# Patient Record
Sex: MALE | Race: BLACK OR AFRICAN AMERICAN | Employment: FULL TIME | URBAN - METROPOLITAN AREA
[De-identification: names, ages, dates, MRNs, and addresses within clinical notes are randomized per-mention and may not be internally consistent; named-entity substitution may affect disease eponyms.]

---

## 2017-07-13 ENCOUNTER — OP HISTORICAL/CONVERTED ENCOUNTER (OUTPATIENT)
Dept: OTHER | Age: 57
End: 2017-07-13

## 2017-08-04 ENCOUNTER — ED HISTORICAL/CONVERTED ENCOUNTER (OUTPATIENT)
Dept: OTHER | Age: 57
End: 2017-08-04

## 2017-08-22 ENCOUNTER — ED HISTORICAL/CONVERTED ENCOUNTER (OUTPATIENT)
Dept: OTHER | Age: 57
End: 2017-08-22

## 2019-05-11 ENCOUNTER — ED HISTORICAL/CONVERTED ENCOUNTER (OUTPATIENT)
Dept: OTHER | Age: 59
End: 2019-05-11

## 2019-06-09 ENCOUNTER — ED HISTORICAL/CONVERTED ENCOUNTER (OUTPATIENT)
Dept: OTHER | Age: 59
End: 2019-06-09

## 2019-07-03 ENCOUNTER — OP HISTORICAL/CONVERTED ENCOUNTER (OUTPATIENT)
Dept: OTHER | Age: 59
End: 2019-07-03

## 2020-03-16 ENCOUNTER — OP HISTORICAL/CONVERTED ENCOUNTER (OUTPATIENT)
Dept: OTHER | Age: 60
End: 2020-03-16

## 2020-03-16 ENCOUNTER — ED HISTORICAL/CONVERTED ENCOUNTER (OUTPATIENT)
Dept: OTHER | Age: 60
End: 2020-03-16

## 2020-04-23 ENCOUNTER — ED HISTORICAL/CONVERTED ENCOUNTER (OUTPATIENT)
Dept: OTHER | Age: 60
End: 2020-04-23

## 2020-06-25 ENCOUNTER — IP HISTORICAL/CONVERTED ENCOUNTER (OUTPATIENT)
Dept: OTHER | Age: 60
End: 2020-06-25

## 2020-08-11 ENCOUNTER — ED HISTORICAL/CONVERTED ENCOUNTER (OUTPATIENT)
Dept: OTHER | Age: 60
End: 2020-08-11

## 2020-08-13 ENCOUNTER — ED HISTORICAL/CONVERTED ENCOUNTER (OUTPATIENT)
Dept: OTHER | Age: 60
End: 2020-08-13

## 2020-09-09 ENCOUNTER — ED HISTORICAL/CONVERTED ENCOUNTER (OUTPATIENT)
Dept: OTHER | Age: 60
End: 2020-09-09

## 2020-11-08 ENCOUNTER — APPOINTMENT (OUTPATIENT)
Dept: GENERAL RADIOLOGY | Age: 60
End: 2020-11-08
Attending: PHYSICIAN ASSISTANT
Payer: COMMERCIAL

## 2020-11-08 ENCOUNTER — HOSPITAL ENCOUNTER (EMERGENCY)
Age: 60
Discharge: HOME | End: 2020-11-08
Payer: COMMERCIAL

## 2020-11-08 VITALS
SYSTOLIC BLOOD PRESSURE: 140 MMHG | DIASTOLIC BLOOD PRESSURE: 84 MMHG | HEART RATE: 100 BPM | BODY MASS INDEX: 33.62 KG/M2 | WEIGHT: 262 LBS | TEMPERATURE: 98.4 F | OXYGEN SATURATION: 98 % | RESPIRATION RATE: 18 BRPM | HEIGHT: 74 IN

## 2020-11-08 DIAGNOSIS — E13.69 OTHER SPECIFIED DIABETES MELLITUS WITH OTHER SPECIFIED COMPLICATION, UNSPECIFIED WHETHER LONG TERM INSULIN USE (HCC): ICD-10-CM

## 2020-11-08 DIAGNOSIS — T14.8XXA TRAUMATIC HEMATOMA: ICD-10-CM

## 2020-11-08 DIAGNOSIS — S97.112A CRUSHING INJURY OF LEFT GREAT TOE, INITIAL ENCOUNTER: Primary | ICD-10-CM

## 2020-11-08 PROCEDURE — 99283 EMERGENCY DEPT VISIT LOW MDM: CPT

## 2020-11-08 PROCEDURE — 75810000463 HC INC/DRN HEMATOMA/SEROMA LVL 2 5052

## 2020-11-08 PROCEDURE — 73660 X-RAY EXAM OF TOE(S): CPT

## 2020-11-08 NOTE — ED TRIAGE NOTES
Reports that a big trashcan/ dumpster hit his L great toe on Friday. States he went home and iced it but the pain has gotten worse and his toe is black and blue c swelling.  Also c/o cramps to L lower leg

## 2020-11-08 NOTE — ED PROVIDER NOTES
EMERGENCY DEPARTMENT HISTORY AND PHYSICAL EXAM      Date: 11/8/2020  Patient Name: Joe Yang    History of Presenting Illness     Chief Complaint   Patient presents with    Toe Pain     L great    Leg Pain     L - cramping       History Provided By: Patient    HPI: Joe Yang, 61 y.o. male with a past medical history significant diabetes and hypertension presents to the ED with cc of left great toe pain onset yesterday after crush injury. Patient reports while he was at work, wearing soft toed shoes, a dumpster rolled over his toe. He has a fungal infection of the toenail previously and he has a history of peripheral neuropathy. His pain is minimal however he was concerned about darkening discoloration to the nail bed. He did not take any treatments prior to arrival.  He denies any other injury during the incident. He denies any other symptoms or complaints at this time. Sugars have been running around 150. He takes gabapentin for his neuropathy which he is not tolerating well. There are no other complaints, changes, or physical findings at this time. PCP: UNKNOWN    No current facility-administered medications on file prior to encounter. Current Outpatient Medications on File Prior to Encounter   Medication Sig Dispense Refill    gabapentin (NEURONTIN) 100 mg capsule TAKE 1 CAPSULE BY MOUTH 2 TIMES A DAY 60 Cap 3       Past History     Past Medical History:  No past medical history on file. Past Surgical History:  No past surgical history on file. Family History:  No family history on file. Social History:  Social History     Tobacco Use    Smoking status: Not on file   Substance Use Topics    Alcohol use: Not on file    Drug use: Not on file       Allergies:  No Known Allergies      Review of Systems   Review of Systems   Constitutional: Negative for activity change, chills and fever. HENT: Negative for congestion, ear pain, rhinorrhea and trouble swallowing. Eyes: Negative for pain and visual disturbance. Respiratory: Negative for cough and shortness of breath. Cardiovascular: Negative for chest pain. Gastrointestinal: Negative for abdominal pain, diarrhea, nausea and vomiting. Genitourinary: Negative for decreased urine volume, difficulty urinating, dysuria and hematuria. Musculoskeletal: Positive for arthralgias and myalgias. Negative for gait problem. Skin: Positive for wound. Negative for rash. Neurological: Negative for weakness and headaches. Hematological: Negative for adenopathy. Psychiatric/Behavioral: The patient is not nervous/anxious. All other systems reviewed and are negative. Physical Exam   Physical Exam  Vitals signs and nursing note reviewed. Constitutional:       General: He is not in acute distress. Appearance: He is well-developed. HENT:      Head: Normocephalic and atraumatic. Mouth/Throat:      Mouth: Mucous membranes are moist.   Eyes:      Extraocular Movements: Extraocular movements intact. Pupils: Pupils are equal, round, and reactive to light. Cardiovascular:      Rate and Rhythm: Normal rate and regular rhythm. Pulses: Normal pulses. Heart sounds: Normal heart sounds. Pulmonary:      Effort: Pulmonary effort is normal. No respiratory distress. Breath sounds: Normal breath sounds. Abdominal:      General: Abdomen is flat. Bowel sounds are normal.      Palpations: Abdomen is soft. Tenderness: There is no abdominal tenderness. Musculoskeletal:      Right lower leg: No edema. Left lower leg: No edema. Left foot: Normal range of motion. No deformity. Feet:    Feet:      Left foot:      Toenail Condition: Left toenails are abnormally thick. Fungal disease present.      Comments: Left great toe: Minimal tenderness to palpation of the joint, minimal erythema, no streaking redness, no drainage, small superficial hematoma to the nailbed, fluctuant, capillary refill less than 3 seconds  Skin:     General: Skin is warm and dry. Capillary Refill: Capillary refill takes less than 2 seconds. Findings: No rash. Neurological:      General: No focal deficit present. Mental Status: He is alert. Cranial Nerves: No cranial nerve deficit. Psychiatric:         Mood and Affect: Mood normal.         Behavior: Behavior normal.         Diagnostic Study Results     Labs -   No results found for this or any previous visit (from the past 48 hour(s)). Radiologic Studies -   Results from Hospital Encounter encounter on 11/08/20   XR GREAT TOE LT MIN 2 V    Narrative Left great toe, 3 views      Impression IMPRESSION: No evidence of acute fracture, dislocation, or radiodense foreign  body. Bipartite lateral plantar sesamoid adjacent to left first metatarsal head. CT Results  (Last 48 hours)    None          Medical Decision Making   I am the first provider for this patient. I reviewed the vital signs, available nursing notes, past medical history, past surgical history, family history and social history. Vital Signs-Reviewed the patient's vital signs. Patient Vitals for the past 12 hrs:   Temp Pulse Resp BP SpO2   11/08/20 1110 98 °F (36.7 °C) (!) 108 18 (!) 165/90 97 %       Records Reviewed: Nursing Notes    Provider Notes (Medical Decision Making):     MDM  Number of Diagnoses or Management Options  Crushing injury of left great toe, initial encounter:   Other specified diabetes mellitus with other specified complication, unspecified whether long term insulin use [facility]):   Traumatic hematoma:   Diagnosis management comments: DDX: Crush injury, fracture, dislocation, traumatic hematoma    I have provided follow-up for the patient with podiatry and vascular surgery to discuss this peripheral neuropathy and have his diabetic foot exam.  I have also advised that he wear steel toed boots at work which she states he owns in order to protect his toes. Amount and/or Complexity of Data Reviewed  Tests in the radiology section of CPT®: ordered and reviewed        ED Course:   Initial assessment performed. The patients presenting problems have been discussed, and they are in agreement with the care plan formulated and outlined with them. I have encouraged them to ask questions as they arise throughout their visit. PROCEDURES    Hematoma Evacuation    Date/Time: 11/8/2020 12:27 PM  Performed by: Ab Kelley PA-C  Authorized by: Ab Kelley PA-C     Consent:     Consent obtained:  Verbal    Consent given by:  Patient    Risks discussed:  Bleeding, pain and incomplete drainage    Alternatives discussed:  No treatment and alternative treatment  Indications:     Indications:  Hematoma  Pre-procedure details:     Procedure prep: alcohol swab. Post-procedure details:     Patient tolerance of procedure: Tolerated well, no immediate complications  Comments:      Hematoma at nailbed of left great toe was incised with 11 blade scalpel, hematoma was fully evacuated         DISPOSITION    Discharged    PLAN:  1. Current Discharge Medication List         2. Follow-up Information     Follow up With Specialties Details Why Contact Info    Mana Vicente DPM Podiatry Schedule an appointment as soon as possible for a visit Podiatrist to follow up with [address] [place] [id number]  [phone]      Kayden Guzman MD General and Vascular Surgery Schedule an appointment as soon as possible for a visit Vascular specialist [address] [facility] [address] [phone]      [facility] EMERGENCY DEPT Emergency Medicine  As needed, If symptoms worsen [address] [facility] [address] [phone]        Return to ED if worse     Diagnosis     Clinical Impression:   1. Crushing injury of left great toe, initial encounter    2. Traumatic hematoma    3.  Other specified diabetes mellitus with other specified complication, unspecified whether long term insulin use ([facility]

## 2020-11-08 NOTE — DISCHARGE INSTRUCTIONS
Patient Education        Hematoma: Care Instructions  Your Care Instructions     A hematoma is a bad bruise. It happens when an injury causes blood to collect and pool under the skin. The pooling blood gives the skin a spongy, rubbery, lumpy feel. A hematoma usually is not a cause for concern. It is not the same thing as a blood clot in a vein, and it does not cause blood clots. Follow-up care is a key part of your treatment and safety. Be sure to make and go to all appointments, and call your doctor if you are having problems. It's also a good idea to know your test results and keep a list of the medicines you take. How can you care for yourself at home? · Rest and protect the bruised area. · Put ice or a cold pack on the area for 10 to 20 minutes at a time. · Prop up the bruised area on a pillow when you ice it or anytime you sit or lie down during the next 3 days. Try to keep it above the level of your heart. This will help reduce swelling. · Wrapping the bruised area with an elastic bandage such as an Ace wrap will help decrease swelling. Don't wrap it too tightly, as this can cause more swelling below the affected area. · Be safe with medicines. Read and follow all instructions on the label. ? If the doctor gave you a prescription medicine for pain, take it as prescribed. ? If you are not taking a prescription pain medicine, ask your doctor if you can take an over-the-counter medicine. · Do not take two or more pain medicines at the same time unless the doctor told you to. Many pain medicines have acetaminophen, which is Tylenol. Too much acetaminophen (Tylenol) can be harmful. When should you call for help? Call your doctor now or seek immediate medical care if:    · You have signs of skin infection, such as:  ? Increased pain, swelling, warmth, or redness. ? Red streaks leading from the area. ? Pus draining from the area. ? A fever.    Watch closely for changes in your health, and be sure to contact your doctor if:    · The bruise lasts longer than 4 weeks.     · The bruise gets bigger or becomes more painful.     · You do not get better as expected. Where can you learn more? Go to http://www.gray.com/  Enter P911 in the search box to learn more about \"Hematoma: Care Instructions. \"  Current as of: June 26, 2019               Content Version: 12.6  © 1800-7537 [employer]. Care instructions adapted under license by [employer] (which disclaims liability or warranty for this information). If you have questions about a medical condition or this instruction, always ask your healthcare professional. [place] [address] any warranty or liability for your use of this information. Patient Education        Learning About Returning to Activity After Injury  What's important to know about injury recovery? Recovery from an injury takes time. Healing can take longer than you want it to. You may be tempted to return to your normal activities before you have fully healed. But stressing an injury makes it take even longer to heal. And you could make your injury worse than it's ever been. An injury heals fastest when you give it the rest and special care it needs. Your doctor can help you know when you're ready to ease back into normal activity. How can you help an injury heal?  You can speed up healing by avoiding movements that make it worse. It's also important to follow your doctor's instructions. · Ask your doctor if you can take an over-the-counter pain medicine, such as acetaminophen (Tylenol), ibuprofen (Advil, Motrin), or naproxen (Aleve). Be safe with medicines. Read and follow all instructions on the label. · Put ice or a cold pack on the area for 10 to 20 minutes at a time to ease pain. Put a thin cloth between the ice and your skin.   · If your doctor gave you a splint, a pair of crutches, or a sling, use it exactly as directed. Physical or occupational therapy can help you learn how to move in new ways and to recover from an injury. If you are given exercises to do, ease into them. Start each exercise slowly. Ease off an exercise if you start to have pain. When you have a routine of exercises, do them as often and as long as prescribed. While you heal, it also helps to keep the rest of your body moving. A physical therapist can suggest other exercises or ways of doing things to keep up your strength and energy. How do you return to activity? Slowly ease back into normal activity so you don't injure yourself again. A reinjury can be harder to heal than an original injury. If you are getting back into a sport, do it step by step. A  or physical therapist can help you do this safely. Start with short, easy movements or workouts. Then slowly add more over time. · Warm up before and stretch after the activity. · Stop what you're doing if it hurts. · When you're done, use ice to prevent pain and swelling. It may help to make some changes. For example, if a sport caused tendon pain, try another one for a while. If using a tool causes pain, change your  or use the other hand. When should you call for help? Watch closely for changes in your health, and be sure to contact your doctor if:  · Your symptoms are getting worse. · You have new symptoms, such as numbness or weakness. · You do not get better as expected. Follow-up care is a key part of your treatment and safety. Be sure to make and go to all appointments, and call your doctor if you are having problems. It's also a good idea to know your test results and keep a list of the medicines you take. Where can you learn more? Go to http://www.gray.com/  Enter B834 in the search box to learn more about \"Learning About Returning to Activity After Injury. \"  Current as of: March 2, 2020               Content Version: 12.6  © 8401-7557 [employer]. Care instructions adapted under license by [employer] (which disclaims liability or warranty for this information). If you have questions about a medical condition or this instruction, always ask your healthcare professional. [address] any warranty or liability for your use of this information.

## 2020-11-11 ENCOUNTER — OFFICE VISIT (OUTPATIENT)
Dept: PODIATRY | Age: 60
End: 2020-11-11
Payer: MEDICAID

## 2020-11-11 VITALS
HEIGHT: 74 IN | TEMPERATURE: 97.8 F | DIASTOLIC BLOOD PRESSURE: 99 MMHG | WEIGHT: 258.8 LBS | HEART RATE: 92 BPM | SYSTOLIC BLOOD PRESSURE: 164 MMHG | BODY MASS INDEX: 33.21 KG/M2 | OXYGEN SATURATION: 99 %

## 2020-11-11 DIAGNOSIS — N52.1 ERECTILE DYSFUNCTION DUE TO DISEASES CLASSIFIED ELSEWHERE: ICD-10-CM

## 2020-11-11 DIAGNOSIS — L03.032 PARONYCHIA OF GREAT TOE OF LEFT FOOT: Primary | ICD-10-CM

## 2020-11-11 DIAGNOSIS — E11.42 TYPE 2 DIABETES MELLITUS WITH DIABETIC POLYNEUROPATHY, WITHOUT LONG-TERM CURRENT USE OF INSULIN (HCC): ICD-10-CM

## 2020-11-11 DIAGNOSIS — E11.42 DIABETIC POLYNEUROPATHY ASSOCIATED WITH TYPE 2 DIABETES MELLITUS (HCC): ICD-10-CM

## 2020-11-11 PROCEDURE — 99203 OFFICE O/P NEW LOW 30 MIN: CPT | Performed by: PODIATRIST

## 2020-11-11 PROCEDURE — 11730 AVULSION NAIL PLATE SIMPLE 1: CPT | Performed by: PODIATRIST

## 2020-11-11 RX ORDER — PREGABALIN 75 MG/1
75 CAPSULE ORAL 3 TIMES DAILY
Qty: 90 CAP | Refills: 2 | Status: SHIPPED | OUTPATIENT
Start: 2020-11-11 | End: 2020-12-09 | Stop reason: ALTCHOICE

## 2020-11-11 RX ORDER — SILVER SULFADIAZINE 10 G/1000G
CREAM TOPICAL DAILY
Qty: 25 G | Refills: 0 | Status: SHIPPED | OUTPATIENT
Start: 2020-11-11 | End: 2020-12-09 | Stop reason: ALTCHOICE

## 2020-11-16 NOTE — PROGRESS NOTES
[facility]    Subjective:         Patient is a 61 y.o. male who is being seen as a new pt for multiple lower extremity complaints. Patient states he is a diabetic who suffers from burning and tingling in both of his feet. He denies any trauma. He states his diabetes is currently under control but he cannot recall his last hemoglobin A1c. He states his left big toenail has become red and swollen. He denies any systemic signs of infection but states he has noted clear drainage. He states he is having pain rising the level of 5 out of 10 in the digit. He states the pain is localized and nonradiating. He states weightbearing and close toed shoes exacerbate the pain. He denies any other lower extremity complaints    As an aside, he states he suffers from erectile dysfunction and would like a recommendation for a local urologist    Past Medical History:   Diagnosis Date    Diabetes ([facility]     Hypercholesterolemia     Hypertension      History reviewed. No pertinent surgical history. History reviewed. No pertinent family history. Social History     Tobacco Use    Smoking status: Never Smoker    Smokeless tobacco: Never Used   Substance Use Topics    Alcohol use: Yes     No Known Allergies  Prior to Admission medications    Medication Sig Start Date End Date Taking? Authorizing Provider   pregabalin (LYRICA) 75 mg capsule Take 1 Cap by mouth three (3) times daily. Max Daily Amount: 225 mg. 11/11/20  Yes Virgen Nava DPM   silver sulfADIAZINE (SILVADENE) 1 % topical cream Apply  to affected area daily. 11/11/20  Yes Karol Neville DPM       Review of Systems   Constitutional: Negative. HENT: Negative. Eyes: Negative. Respiratory: Negative. Cardiovascular: Negative. Gastrointestinal: Negative. Endocrine: Negative. Genitourinary: Negative. Musculoskeletal: Negative. Allergic/Immunologic: Positive for immunocompromised state.    Neurological: Positive for numbness. Hematological: Negative. Psychiatric/Behavioral: Negative. All other systems reviewed and are negative. Objective:     Visit Vitals  BP (!) 164/99 (BP 1 Location: Right arm, BP Patient Position: Sitting)   Pulse 92   Temp 97.8 °F (36.6 °C) (Temporal)   Ht 6' 2\" (1.88 m)   Wt 258 lb 12.8 oz (117.4 kg)   SpO2 99%   BMI 33.23 kg/m²       Physical Exam  Vitals signs reviewed. Constitutional:       Appearance: He is obese. Cardiovascular:      Pulses:           Dorsalis pedis pulses are 2+ on the right side and 2+ on the left side. Posterior tibial pulses are 2+ on the right side and 2+ on the left side. Pulmonary:      Effort: Pulmonary effort is normal.   Musculoskeletal:      Right lower leg: No edema. Left lower leg: No edema. Right foot: Normal range of motion. No deformity or bunion. Left foot: Normal range of motion. No deformity or bunion. Feet:      Right foot:      Protective Sensation: 10 sites tested. 0 sites sensed. Skin integrity: Skin integrity normal.      Toenail Condition: Right toenails are normal.      Left foot:      Protective Sensation: 10 sites tested. 0 sites sensed. Skin integrity: Erythema and warmth present. Toenail Condition: Left toenails are ingrown. Lymphadenopathy:      Lower Body: No right inguinal adenopathy. No left inguinal adenopathy. Skin:     General: Skin is warm. Capillary Refill: Capillary refill takes 2 to 3 seconds. Neurological:      Mental Status: He is alert and oriented to person, place, and time. Psychiatric:         Mood and Affect: Mood and affect normal.         Behavior: Behavior is cooperative. Data Review: No results found for this or any previous visit (from the past 24 hour(s)). Impression:       ICD-10-CM ICD-9-CM    1. Paronychia of great toe of left foot  L03.032 681.11    2.  Diabetic polyneuropathy associated with type 2 diabetes mellitus (HCC)  E11.42 250.60 pregabalin (LYRICA) 75 mg capsule     357.2    3. Type 2 diabetes mellitus with diabetic polyneuropathy, without long-term current use of insulin (HCC)  E11.42 250.60      357.2    4. Erectile dysfunction due to diseases classified elsewhere  N52.1 607.84 REFERRAL TO UROLOGY         Recommendation:     Patient seen and evaluated in the office  Discussed and educated patient regarding their current medical condition. Instructed patient to monitor their feet daily, be compliant with all medications and wear supportive shoe gear. Patient elected for total nail plate removal of the left hallux. This was performed without incident and no anesthesia was needed. Appropriate dressing was applied.   Patient given home instructions to perform Epsom salt and warm water soaks and to apply antibiotic cream daily  A prescription was given for Silvadene 1% antibiotic cream to be applied as directed  Thoroughly discussed his diabetic peripheral neuropathy, a prescription was given for Lyrica 75 mg to be taken 3 times daily for symptomatic relief  Lastly, a referral was given to a local urologist for his erectile dysfunction        RTC in

## 2020-12-09 ENCOUNTER — OFFICE VISIT (OUTPATIENT)
Dept: UROLOGY | Age: 60
End: 2020-12-09
Payer: MEDICAID

## 2020-12-09 VITALS — HEIGHT: 74 IN | BODY MASS INDEX: 31.83 KG/M2 | TEMPERATURE: 96.8 F | WEIGHT: 248 LBS

## 2020-12-09 DIAGNOSIS — E11.42 TYPE 2 DIABETES MELLITUS WITH DIABETIC POLYNEUROPATHY, WITHOUT LONG-TERM CURRENT USE OF INSULIN (HCC): ICD-10-CM

## 2020-12-09 DIAGNOSIS — N40.0 BENIGN PROSTATIC HYPERPLASIA, UNSPECIFIED WHETHER LOWER URINARY TRACT SYMPTOMS PRESENT: ICD-10-CM

## 2020-12-09 DIAGNOSIS — N52.8 OTHER MALE ERECTILE DYSFUNCTION: Primary | ICD-10-CM

## 2020-12-09 DIAGNOSIS — I10 ESSENTIAL HYPERTENSION: ICD-10-CM

## 2020-12-09 LAB
BILIRUB UR QL STRIP: NEGATIVE
GLUCOSE UR-MCNC: NEGATIVE MG/DL
KETONES P FAST UR STRIP-MCNC: NEGATIVE MG/DL
PH UR STRIP: 5.5 [PH] (ref 4.6–8)
PROT UR QL STRIP: NORMAL
SP GR UR STRIP: 1.02 (ref 1–1.03)
UA UROBILINOGEN AMB POC: NORMAL (ref 0.2–1)
URINALYSIS CLARITY POC: CLEAR
URINALYSIS COLOR POC: YELLOW
URINE BLOOD POC: NEGATIVE
URINE LEUKOCYTES POC: NEGATIVE
URINE NITRITES POC: NEGATIVE

## 2020-12-09 PROCEDURE — 99244 OFF/OP CNSLTJ NEW/EST MOD 40: CPT | Performed by: UROLOGY

## 2020-12-09 PROCEDURE — 81003 URINALYSIS AUTO W/O SCOPE: CPT | Performed by: UROLOGY

## 2020-12-09 RX ORDER — ASPIRIN 81 MG/1
81 TABLET ORAL DAILY
COMMUNITY

## 2020-12-09 RX ORDER — PANTOPRAZOLE SODIUM 40 MG/1
40 TABLET, DELAYED RELEASE ORAL DAILY
COMMUNITY
End: 2021-10-06 | Stop reason: ALTCHOICE

## 2020-12-09 RX ORDER — DULOXETIN HYDROCHLORIDE 60 MG/1
60 CAPSULE, DELAYED RELEASE ORAL DAILY
COMMUNITY
End: 2021-10-06 | Stop reason: ALTCHOICE

## 2020-12-09 RX ORDER — GABAPENTIN 100 MG/1
CAPSULE ORAL 3 TIMES DAILY
COMMUNITY
End: 2021-01-27

## 2020-12-09 RX ORDER — OMEPRAZOLE 40 MG/1
40 CAPSULE, DELAYED RELEASE ORAL DAILY
COMMUNITY

## 2020-12-09 RX ORDER — ATORVASTATIN CALCIUM 20 MG/1
20 TABLET, FILM COATED ORAL DAILY
COMMUNITY

## 2020-12-09 RX ORDER — METOPROLOL TARTRATE 100 MG/1
100 TABLET ORAL 2 TIMES DAILY
COMMUNITY

## 2020-12-09 RX ORDER — PREGABALIN 75 MG/1
CAPSULE ORAL
COMMUNITY
End: 2021-01-28

## 2020-12-09 RX ORDER — TADALAFIL 5 MG/1
5 TABLET ORAL DAILY
Qty: 90 TAB | Refills: 5 | Status: SHIPPED | OUTPATIENT
Start: 2020-12-09 | End: 2022-05-17

## 2020-12-09 NOTE — ASSESSMENT & PLAN NOTE
He has improving sugars. He does not check them daily. Key Antihyperglycemic Medications             empagliflozin (Jardiance) 10 mg tablet (Taking) Take  by mouth daily. Other Key Diabetic Medications             atorvastatin (LIPITOR) 20 mg tablet (Taking) Take  by mouth daily. gabapentin (NEURONTIN) 100 mg capsule (Taking) Take  by mouth three (3) times daily. pregabalin (LYRICA) 75 mg capsule (Taking) Take  by mouth.

## 2020-12-09 NOTE — PROGRESS NOTES
HISTORY OF PRESENT ILLNESS  Enrike Bruner is a 61 y.o. male. Chief Complaint   Patient presents with    New Patient    Erectile Dysfunction     He has a h/o DM, HTN. He is here for ED, referred from Dr Emmie De La Rosa. He has 5-6 months of problems. HUBERT score 8. See the problem list.       He has a weaker stream, intermittency, nocturia x3. IPSS 16/6    Chronic Conditions Addressed Today     1. Type 2 diabetes mellitus with diabetic polyneuropathy, without long-term current use of insulin ([address]     Current Assessment & Plan      He has improving sugars. He does not check them daily. Key Antihyperglycemic Medications             empagliflozin (Jardiance) 10 mg tablet (Taking) Take  by mouth daily. Other Key Diabetic Medications             atorvastatin (LIPITOR) 20 mg tablet (Taking) Take  by mouth daily. gabapentin (NEURONTIN) 100 mg capsule (Taking) Take  by mouth three (3) times daily. pregabalin (LYRICA) 75 mg capsule (Taking) Take  by mouth. Relevant Medications     atorvastatin (LIPITOR) 20 mg tablet     gabapentin (NEURONTIN) 100 mg capsule     empagliflozin (Jardiance) 10 mg tablet     DULoxetine (CYMBALTA) 60 mg capsule     aspirin delayed-release 81 mg tablet     pregabalin (LYRICA) 75 mg capsule     Other Relevant Orders     AMB POC URINALYSIS DIP STICK AUTO W/O MICRO (Completed)     METABOLIC PANEL, COMPREHENSIVE    2. Other male erectile dysfunction - Primary     Overview      Starting summer 2020. He has less confidence in his erections. He can achieve 30-40% tumescence barely enough for penetration. He cannot maintain it. His interest is normal.  It is affecting his relationships. Current Assessment & Plan      He has bothersome ED. Contributing factors are diabetes and HTN. I will check a baseline testosterone. We discussed tadalafil 5mg daily. The patient was instructed on the dosing of the medication and reason for taking it. We discussed the common and serious risks. The patient is advised to be cautious of side effects with a new medication. He wishes to proceed          Relevant Orders     TESTOSTERONE, FREE & TOTAL    3. Essential hypertension     Current Assessment & Plan      On metoprolol. Controlled. Relevant Medications     atorvastatin (LIPITOR) 20 mg tablet     metoprolol tartrate (LOPRESSOR) 100 mg IR tablet     aspirin delayed-release 81 mg tablet     Other Relevant Orders     METABOLIC PANEL, COMPREHENSIVE    4. Benign prostatic hyperplasia     Current Assessment & Plan      Moderate LUTS. Tadalafil may help some. Reassess after medication. Relevant Orders     AMB POC URINALYSIS DIP STICK AUTO W/O MICRO (Completed)     PSA, DIAGNOSTIC (PROSTATE SPECIFIC AG)            Review of Systems   All other systems reviewed and are negative. Past Medical History:   Diagnosis Date    Diabetes ([address]     Hypercholesterolemia     Hypertension       Past Surgical History:   Procedure Laterality Date    CARDIAC SURG PROCEDURE UNLIST      Atrial fibrilation      Family History   Problem Relation Age of Onset    Cancer Father         Physical Exam  Constitutional:       General: He is not in acute distress. Appearance: Normal appearance. HENT:      Head: Normocephalic and atraumatic. Eyes:      Extraocular Movements: Extraocular movements intact. Pupils: Pupils are equal, round, and reactive to light. Cardiovascular:      Rate and Rhythm: Normal rate and regular rhythm. Pulmonary:      Effort: Pulmonary effort is normal. No respiratory distress. Breath sounds: Normal breath sounds. No wheezing or rhonchi. Genitourinary:     Penis: Normal. No phimosis, hypospadias or tenderness. Scrotum/Testes: Normal.         Right: Mass, tenderness or swelling not present. Left: Mass, tenderness or swelling not present. Epididymis:      Right: Normal. No mass or tenderness. Left: Normal. No mass or tenderness. Prostate: Enlarged (1+). Not tender and no nodules present. Rectum: Normal.   Musculoskeletal: Normal range of motion. Lymphadenopathy:      Cervical: No cervical adenopathy. Upper Body:      Right upper body: No supraclavicular adenopathy. Left upper body: No supraclavicular adenopathy. Skin:     General: Skin is warm and dry. Neurological:      General: No focal deficit present. Mental Status: He is alert and oriented to person, place, and time. Psychiatric:         Mood and Affect: Mood normal.         Behavior: Behavior normal.                     ASSESSMENT and PLAN  Diagnoses and all orders for this visit:    1. Other male erectile dysfunction  Assessment & Plan:  He has bothersome ED. Contributing factors are diabetes and HTN. I will check a baseline testosterone. We discussed tadalafil 5mg daily. The patient was instructed on the dosing of the medication and reason for taking it. We discussed the common and serious risks. The patient is advised to be cautious of side effects with a new medication. He wishes to proceed    Orders:  -     TESTOSTERONE, FREE & TOTAL    2. Benign prostatic hyperplasia, unspecified whether lower urinary tract symptoms present  Assessment & Plan: Moderate LUTS. Tadalafil may help some. Reassess after medication. Orders:  -     AMB POC URINALYSIS DIP STICK AUTO W/O MICRO  -     PSA, DIAGNOSTIC (PROSTATE SPECIFIC AG)    3. Type 2 diabetes mellitus with diabetic polyneuropathy, without long-term current use of insulin ([facility]  Assessment & Plan:  He has improving sugars. He does not check them daily. Key Antihyperglycemic Medications             empagliflozin (Jardiance) 10 mg tablet (Taking) Take  by mouth daily. Other Key Diabetic Medications             atorvastatin (LIPITOR) 20 mg tablet (Taking) Take  by mouth daily.     gabapentin (NEURONTIN) 100 mg capsule (Taking) Take  by mouth three (3) times daily. pregabalin (LYRICA) 75 mg capsule (Taking) Take  by mouth. Orders:  -     AMB POC URINALYSIS DIP STICK AUTO W/O MICRO  -     METABOLIC PANEL, COMPREHENSIVE    4. Essential hypertension  Assessment & Plan: On metoprolol. Controlled. Orders:  -     METABOLIC PANEL, COMPREHENSIVE    Other orders  -     tadalafiL (CIALIS) 5 mg tablet; Take 1 Tab by mouth daily. Follow-up and Dispositions    · Return in about 1 month (around 1/9/2021).          Esme Lerner MD

## 2020-12-09 NOTE — ASSESSMENT & PLAN NOTE
He has bothersome ED. Contributing factors are diabetes and HTN. I will check a baseline testosterone. We discussed tadalafil 5mg daily. The patient was instructed on the dosing of the medication and reason for taking it. We discussed the common and serious risks. The patient is advised to be cautious of side effects with a new medication.   He wishes to proceed

## 2020-12-12 LAB
ALBUMIN SERPL-MCNC: 4.4 G/DL (ref 3.8–4.9)
ALBUMIN/GLOB SERPL: 1.5 {RATIO} (ref 1.2–2.2)
ALP SERPL-CCNC: 74 IU/L (ref 39–117)
ALT SERPL-CCNC: 31 IU/L (ref 0–44)
AST SERPL-CCNC: 26 IU/L (ref 0–40)
BILIRUB SERPL-MCNC: 0.3 MG/DL (ref 0–1.2)
BUN SERPL-MCNC: 11 MG/DL (ref 8–27)
BUN/CREAT SERPL: 14 (ref 10–24)
CALCIUM SERPL-MCNC: 9.8 MG/DL (ref 8.6–10.2)
CHLORIDE SERPL-SCNC: 102 MMOL/L (ref 96–106)
CO2 SERPL-SCNC: 21 MMOL/L (ref 20–29)
CREAT SERPL-MCNC: 0.81 MG/DL (ref 0.76–1.27)
GLOBULIN SER CALC-MCNC: 2.9 G/DL (ref 1.5–4.5)
GLUCOSE SERPL-MCNC: 157 MG/DL (ref 65–99)
POTASSIUM SERPL-SCNC: 4.2 MMOL/L (ref 3.5–5.2)
PROT SERPL-MCNC: 7.3 G/DL (ref 6–8.5)
PSA SERPL-MCNC: 0.3 NG/ML (ref 0–4)
SODIUM SERPL-SCNC: 139 MMOL/L (ref 134–144)
TESTOST FREE SERPL-MCNC: 8.9 PG/ML (ref 6.6–18.1)
TESTOST SERPL-MCNC: 210 NG/DL (ref 264–916)

## 2020-12-16 NOTE — PROGRESS NOTES
Low baseline testosterone; can discuss at f/u visit. Was started on daily tadalafil at last visit; will evaluate effectiveness at next visit.

## 2021-01-11 PROBLEM — R79.89 LOW TESTOSTERONE IN MALE: Status: ACTIVE | Noted: 2021-01-11

## 2021-01-13 ENCOUNTER — OFFICE VISIT (OUTPATIENT)
Dept: UROLOGY | Age: 61
End: 2021-01-13
Payer: MEDICAID

## 2021-01-13 VITALS — TEMPERATURE: 97.6 F | BODY MASS INDEX: 31.57 KG/M2 | HEIGHT: 74 IN | WEIGHT: 246 LBS

## 2021-01-13 DIAGNOSIS — N40.0 BENIGN PROSTATIC HYPERPLASIA, UNSPECIFIED WHETHER LOWER URINARY TRACT SYMPTOMS PRESENT: ICD-10-CM

## 2021-01-13 DIAGNOSIS — R79.89 LOW TESTOSTERONE IN MALE: ICD-10-CM

## 2021-01-13 DIAGNOSIS — N52.8 OTHER MALE ERECTILE DYSFUNCTION: Primary | ICD-10-CM

## 2021-01-13 DIAGNOSIS — R31.29 MICROSCOPIC HEMATURIA: ICD-10-CM

## 2021-01-13 LAB
BILIRUB UR QL STRIP: NEGATIVE
GLUCOSE UR-MCNC: NEGATIVE MG/DL
KETONES P FAST UR STRIP-MCNC: NEGATIVE MG/DL
PH UR STRIP: 6 [PH] (ref 4.6–8)
PROT UR QL STRIP: NORMAL
SP GR UR STRIP: 1.03 (ref 1–1.03)
UA UROBILINOGEN AMB POC: NORMAL (ref 0.2–1)
URINALYSIS CLARITY POC: CLEAR
URINALYSIS COLOR POC: YELLOW
URINE BLOOD POC: NORMAL
URINE LEUKOCYTES POC: NEGATIVE
URINE NITRITES POC: NEGATIVE

## 2021-01-13 PROCEDURE — 81003 URINALYSIS AUTO W/O SCOPE: CPT | Performed by: UROLOGY

## 2021-01-13 PROCEDURE — 99214 OFFICE O/P EST MOD 30 MIN: CPT | Performed by: UROLOGY

## 2021-01-13 RX ORDER — TESTOSTERONE CYPIONATE 200 MG/ML
200 INJECTION INTRAMUSCULAR EVERY 2 WEEKS
Qty: 10 ML | Refills: 3 | Status: SHIPPED | OUTPATIENT
Start: 2021-01-13 | End: 2021-10-06 | Stop reason: SDUPTHER

## 2021-01-13 NOTE — ASSESSMENT & PLAN NOTE
He noted some improvement with tadalafil but not sufficiently. We will try testosterone supplementation.

## 2021-01-13 NOTE — PROGRESS NOTES
HISTORY OF PRESENT ILLNESS  Sandi Link is a 61 y.o. male. Chief Complaint   Patient presents with    Follow-up    Benign Prostatic Hypertrophy     He notes some improvement with tadalafil. See problem list.     Chronic Conditions Addressed Today     1. Other male erectile dysfunction - Primary     Overview      11/11/2020: Starting summer 2020. He has less confidence in his erections. He can achieve 30-40% tumescence barely enough for penetration. He cannot maintain it. His interest is normal.  It is affecting his relationships. He has bothersome ED. Contributing factors are diabetes and HTN. He was started on tadalafil 5mg daily. Erections went from 2/10 to 4-5/10. Current Assessment & Plan      He noted some improvement with tadalafil but not sufficiently. We will try testosterone supplementation. 2. Benign prostatic hyperplasia     Overview      11/11/2020: Moderate LUTS. Tadalafil may help some, 5 mg daily. Germaine Batista MD). Current Assessment & Plan      He was put on tadalafil for urinary symptoms. He is improved. He voids less often. Relevant Orders     AMB POC URINALYSIS DIP STICK AUTO W/O MICRO     METABOLIC PANEL, BASIC     CBC WITH AUTOMATED DIFF    3. Low testosterone in male     Overview      12/9/2020: serum testosterone was 210, though free testosterone was 8.9. Current Assessment & Plan      He had a adequate free testosterone despite a low total testosterone level. This indicates that testosterone supplementation may not help. He is having ED. He wishes to try supplementation. Relevant Medications     testosterone cypionate (DEPOTESTOTERONE CYPIONATE) 200 mg/mL injection     Other Relevant Orders     METABOLIC PANEL, BASIC     CBC WITH AUTOMATED DIFF     TESTOSTERONE, FREE & TOTAL            Review of Systems   All other systems reviewed and are negative.       Past Medical History:   Diagnosis Date    Diabetes ([address]     Hypercholesterolemia     Hypertension       Past Surgical History:   Procedure Laterality Date    [place] CARDIAC SURG PROCEDURE UNLIST      Atrial fibrilation      Family History   Problem Relation Age of Onset    Cancer Father         Physical Exam  Constitutional:       Appearance: Normal appearance. HENT:      Head: Normocephalic and atraumatic. Nose: Nose normal.   Eyes:      Extraocular Movements: Extraocular movements intact. Conjunctiva/sclera: Conjunctivae normal.   Pulmonary:      Effort: Pulmonary effort is normal. No respiratory distress. Neurological:      Mental Status: He is alert and oriented to person, place, and time. Psychiatric:         Mood and Affect: Mood normal.         Behavior: Behavior normal.                     ASSESSMENT and PLAN  Diagnoses and all orders for this visit:    1. Other male erectile dysfunction  Assessment & Plan:  He noted some improvement with tadalafil but not sufficiently. We will try testosterone supplementation. 2. Low testosterone in male  Assessment & Plan:  He had a adequate free testosterone despite a low total testosterone level. This indicates that testosterone supplementation may not help. He is having ED. He wishes to try supplementation. Orders:  -     testosterone cypionate (DEPOTESTOTERONE CYPIONATE) 200 mg/mL injection; 1 mL by IntraMUSCular route Once every 2 weeks. Max Daily Amount: 200 mg.  -     METABOLIC PANEL, BASIC; Future  -     CBC WITH AUTOMATED DIFF; Future  -     TESTOSTERONE, FREE & TOTAL; Future    3. Benign prostatic hyperplasia, unspecified whether lower urinary tract symptoms present  Assessment & Plan:  He was put on tadalafil for urinary symptoms. He is improved. He voids less often. Orders:  -     AMB POC URINALYSIS DIP STICK AUTO W/O MICRO  -     METABOLIC PANEL, BASIC; Future  -     CBC WITH AUTOMATED DIFF;  Future             Narciso Zapien MD

## 2021-01-13 NOTE — ASSESSMENT & PLAN NOTE
He had a adequate free testosterone despite a low total testosterone level. This indicates that testosterone supplementation may not help. He is having ED. He wishes to try supplementation.

## 2021-01-14 ENCOUNTER — OFFICE VISIT (OUTPATIENT)
Dept: UROLOGY | Age: 61
End: 2021-01-14
Payer: MEDICAID

## 2021-01-14 DIAGNOSIS — N40.0 BENIGN PROSTATIC HYPERPLASIA, UNSPECIFIED WHETHER LOWER URINARY TRACT SYMPTOMS PRESENT: Primary | ICD-10-CM

## 2021-01-14 LAB
APPEARANCE UR: CLEAR
BACTERIA #/AREA URNS HPF: NORMAL /[HPF]
BILIRUB UR QL STRIP: NEGATIVE
CASTS URNS QL MICRO: NORMAL /LPF
COLOR UR: YELLOW
EPI CELLS #/AREA URNS HPF: NORMAL /HPF (ref 0–10)
GLUCOSE UR QL: NEGATIVE
HGB UR QL STRIP: NEGATIVE
KETONES UR QL STRIP: NEGATIVE
LEUKOCYTE ESTERASE UR QL STRIP: NEGATIVE
MICRO URNS: ABNORMAL
MUCOUS THREADS URNS QL MICRO: PRESENT
NITRITE UR QL STRIP: NEGATIVE
PH UR STRIP: 6 [PH] (ref 5–7.5)
PROT UR QL STRIP: ABNORMAL
RBC #/AREA URNS HPF: NORMAL /HPF (ref 0–2)
SP GR UR: 1.02 (ref 1–1.03)
UROBILINOGEN UR STRIP-MCNC: 0.2 MG/DL (ref 0.2–1)
WBC #/AREA URNS HPF: NORMAL /HPF (ref 0–5)

## 2021-01-14 PROCEDURE — 96372 THER/PROPH/DIAG INJ SC/IM: CPT | Performed by: UROLOGY

## 2021-01-14 RX ORDER — TESTOSTERONE CYPIONATE 200 MG/ML
200 INJECTION INTRAMUSCULAR
Status: COMPLETED | OUTPATIENT
Start: 2021-01-14 | End: 2021-01-14

## 2021-01-14 RX ADMIN — TESTOSTERONE CYPIONATE 200 MG: 200 INJECTION INTRAMUSCULAR at 16:00

## 2021-01-27 ENCOUNTER — OFFICE VISIT (OUTPATIENT)
Dept: PODIATRY | Age: 61
End: 2021-01-27
Payer: MEDICAID

## 2021-01-27 VITALS
TEMPERATURE: 97.1 F | HEIGHT: 74 IN | OXYGEN SATURATION: 98 % | WEIGHT: 255.2 LBS | DIASTOLIC BLOOD PRESSURE: 79 MMHG | HEART RATE: 102 BPM | BODY MASS INDEX: 32.75 KG/M2 | SYSTOLIC BLOOD PRESSURE: 145 MMHG

## 2021-01-27 DIAGNOSIS — E11.42 TYPE 2 DIABETES MELLITUS WITH DIABETIC POLYNEUROPATHY, WITHOUT LONG-TERM CURRENT USE OF INSULIN (HCC): Primary | ICD-10-CM

## 2021-01-27 DIAGNOSIS — E11.42 DIABETIC POLYNEUROPATHY ASSOCIATED WITH TYPE 2 DIABETES MELLITUS (HCC): ICD-10-CM

## 2021-01-27 PROCEDURE — 99213 OFFICE O/P EST LOW 20 MIN: CPT | Performed by: PODIATRIST

## 2021-01-27 NOTE — PROGRESS NOTES
[facility]    Subjective:         Patient is a 61 y.o. male who is being seen as a returning pt for a possible wound formation to his left hallux and burning/tingling in his feet. Pt states he has been soaking his left foot in alcohol to help with infx. States the foot is getting very dry. States there is a discoloration. Denies any local/systemic signs of infx. States the burning/tingling is worse at night. Denies any recent trauma. Denies any other pedal complaints. Past Medical History:   Diagnosis Date    Diabetes ([address]     Hypercholesterolemia     Hypertension      Past Surgical History:   Procedure Laterality Date    [place] CARDIAC SURG PROCEDURE UNLIST      Atrial fibrilation        Family History   Problem Relation Age of Onset    Cancer Father       Social History     Tobacco Use    Smoking status: Former Smoker     Years: 3.00    Smokeless tobacco: Never Used   Substance Use Topics    Alcohol use: Yes     No Known Allergies  Prior to Admission medications    Medication Sig Start Date End Date Taking? Authorizing Provider   testosterone cypionate (DEPOTESTOTERONE CYPIONATE) 200 mg/mL injection 1 mL by IntraMUSCular route Once every 2 weeks. Max Daily Amount: 200 mg. 1/13/21   Khadra Piña MD   omeprazole (PRILOSEC) 40 mg capsule Take 40 mg by mouth daily. Provider, Historical   atorvastatin (LIPITOR) 20 mg tablet Take  by mouth daily. Provider, Historical   gabapentin (NEURONTIN) 100 mg capsule Take  by mouth three (3) times daily. Provider, Historical   empagliflozin (Jardiance) 10 mg tablet Take  by mouth daily. Provider, Historical   metoprolol tartrate (LOPRESSOR) 100 mg IR tablet Take  by mouth two (2) times a day. Provider, Historical   DULoxetine (CYMBALTA) 60 mg capsule Take 60 mg by mouth daily. Provider, Historical   aspirin delayed-release 81 mg tablet Take  by mouth daily.     Provider, Historical   pregabalin (LYRICA) 75 mg capsule Take  by mouth.    Provider, Historical   pantoprazole (PROTONIX) 40 mg tablet Take 40 mg by mouth daily. Provider, Historical   ibuprofen 100 mg tablet Take 100 mg by mouth every six (6) hours as needed for Pain. Provider, Historical   tadalafiL (CIALIS) 5 mg tablet Take 1 Tab by mouth daily. 12/9/20   Brinda Pedersen MD       Review of Systems   Constitutional: Negative. HENT: Negative. Eyes: Negative. Respiratory: Negative. Cardiovascular: Negative. Gastrointestinal: Negative. Endocrine: Negative. Genitourinary: Negative. Musculoskeletal: Negative. Allergic/Immunologic: Positive for immunocompromised state. Neurological: Positive for numbness. Hematological: Negative. Psychiatric/Behavioral: Negative. All other systems reviewed and are negative. Objective:     Visit Vitals  BP (!) 145/79 (BP 1 Location: Left arm, BP Patient Position: Sitting)   Pulse (!) 102   Temp 97.1 °F (36.2 °C) (Temporal)   Ht 6' 2\" (1.88 m)   Wt 255 lb 3.2 oz (115.8 kg)   SpO2 98%   BMI 32.77 kg/m²       Physical Exam  Vitals signs reviewed. Constitutional:       Appearance: He is obese. Cardiovascular:      Pulses:           Dorsalis pedis pulses are 2+ on the right side and 2+ on the left side. Posterior tibial pulses are 2+ on the right side and 2+ on the left side. Pulmonary:      Effort: Pulmonary effort is normal.   Musculoskeletal:      Right lower leg: No edema. Left lower leg: No edema. Right foot: Normal range of motion. No deformity or bunion. Left foot: Normal range of motion. No deformity or bunion. Feet:      Right foot:      Protective Sensation: 10 sites tested. 0 sites sensed. Skin integrity: Skin integrity normal.      Toenail Condition: Right toenails are normal.      Left foot:      Protective Sensation: 10 sites tested. 0 sites sensed. Skin integrity: Callus and dry skin present. Toenail Condition: Left toenails are ingrown. Lymphadenopathy:      Lower Body: No right inguinal adenopathy. No left inguinal adenopathy. Skin:     General: Skin is warm. Capillary Refill: Capillary refill takes 2 to 3 seconds. Neurological:      Mental Status: He is alert and oriented to person, place, and time. Psychiatric:         Mood and Affect: Mood and affect normal.         Behavior: Behavior is cooperative. Data Review: No results found for this or any previous visit (from the past 24 hour(s)). Impression:     DM  Peripheral Neuropathy      Recommendation:     Patient seen and evaluated in the office  Discussed and educated patient regarding their current medical condition. Instructed patient to monitor their feet daily, be compliant with all medications and wear supportive shoe gear.   In depth convo had regardign medication management of his peripheral neuropathy, given rx for gabapentin to be taken TID

## 2021-01-28 ENCOUNTER — TELEPHONE (OUTPATIENT)
Dept: PODIATRY | Age: 61
End: 2021-01-28

## 2021-01-28 RX ORDER — GABAPENTIN 300 MG/1
300 CAPSULE ORAL 3 TIMES DAILY
Qty: 90 CAP | Refills: 2 | Status: SHIPPED | OUTPATIENT
Start: 2021-01-28 | End: 2021-10-12

## 2021-02-02 ENCOUNTER — CLINICAL SUPPORT (OUTPATIENT)
Dept: UROLOGY | Age: 61
End: 2021-02-02
Payer: MEDICAID

## 2021-02-02 DIAGNOSIS — R79.89 LOW TESTOSTERONE IN MALE: Primary | ICD-10-CM

## 2021-02-02 PROCEDURE — 96372 THER/PROPH/DIAG INJ SC/IM: CPT | Performed by: UROLOGY

## 2021-02-02 RX ORDER — TESTOSTERONE CYPIONATE 200 MG/ML
200 INJECTION INTRAMUSCULAR
Status: COMPLETED | OUTPATIENT
Start: 2021-02-02 | End: 2021-02-02

## 2021-02-02 RX ADMIN — TESTOSTERONE CYPIONATE 200 MG: 200 INJECTION INTRAMUSCULAR at 10:00

## 2021-03-16 ENCOUNTER — HOSPITAL ENCOUNTER (EMERGENCY)
Age: 61
Discharge: HOME | End: 2021-03-16
Payer: COMMERCIAL

## 2021-03-16 ENCOUNTER — APPOINTMENT (OUTPATIENT)
Dept: GENERAL RADIOLOGY | Age: 61
End: 2021-03-16
Attending: NURSE PRACTITIONER
Payer: COMMERCIAL

## 2021-03-16 VITALS
HEART RATE: 90 BPM | WEIGHT: 246 LBS | OXYGEN SATURATION: 98 % | TEMPERATURE: 97.9 F | HEIGHT: 74 IN | BODY MASS INDEX: 31.57 KG/M2 | SYSTOLIC BLOOD PRESSURE: 153 MMHG | RESPIRATION RATE: 16 BRPM | DIASTOLIC BLOOD PRESSURE: 103 MMHG

## 2021-03-16 DIAGNOSIS — S80.02XA CONTUSION OF LEFT KNEE, INITIAL ENCOUNTER: ICD-10-CM

## 2021-03-16 DIAGNOSIS — M24.232 DISORDER OF LIGAMENT OF LEFT WRIST: ICD-10-CM

## 2021-03-16 DIAGNOSIS — M25.511 ACUTE PAIN OF RIGHT SHOULDER: Primary | ICD-10-CM

## 2021-03-16 PROCEDURE — 73564 X-RAY EXAM KNEE 4 OR MORE: CPT

## 2021-03-16 PROCEDURE — 73110 X-RAY EXAM OF WRIST: CPT

## 2021-03-16 PROCEDURE — 71045 X-RAY EXAM CHEST 1 VIEW: CPT

## 2021-03-16 PROCEDURE — 74011250637 HC RX REV CODE- 250/637: Performed by: NURSE PRACTITIONER

## 2021-03-16 PROCEDURE — 74011250636 HC RX REV CODE- 250/636: Performed by: NURSE PRACTITIONER

## 2021-03-16 PROCEDURE — 90715 TDAP VACCINE 7 YRS/> IM: CPT | Performed by: NURSE PRACTITIONER

## 2021-03-16 PROCEDURE — 90471 IMMUNIZATION ADMIN: CPT

## 2021-03-16 PROCEDURE — 99283 EMERGENCY DEPT VISIT LOW MDM: CPT

## 2021-03-16 PROCEDURE — 73080 X-RAY EXAM OF ELBOW: CPT

## 2021-03-16 PROCEDURE — 73030 X-RAY EXAM OF SHOULDER: CPT

## 2021-03-16 RX ORDER — IBUPROFEN 800 MG/1
800 TABLET ORAL
Status: COMPLETED | OUTPATIENT
Start: 2021-03-16 | End: 2021-03-16

## 2021-03-16 RX ORDER — HYDROCODONE BITARTRATE AND ACETAMINOPHEN 5; 325 MG/1; MG/1
1 TABLET ORAL
Qty: 12 TAB | Refills: 0 | Status: SHIPPED | OUTPATIENT
Start: 2021-03-16 | End: 2021-03-19

## 2021-03-16 RX ADMIN — IBUPROFEN 800 MG: 800 TABLET ORAL at 09:42

## 2021-03-16 RX ADMIN — TETANUS TOXOID, REDUCED DIPHTHERIA TOXOID AND ACELLULAR PERTUSSIS VACCINE, ADSORBED 0.5 ML: 5; 2.5; 8; 8; 2.5 SUSPENSION INTRAMUSCULAR at 09:42

## 2021-03-16 NOTE — Clinical Note
[address] [place] [address] EMERGENCY DEPT 
[facility] [address] [place] [id number] 
[phone] Work/School Note Date: 3/16/2021 To Whom It May concern: 
 
Rhoda Pastrana was seen and treated today in the emergency room by the following provider(s): 
Nurse Practitioner: Cuco Quinones NP. Rhoda Pastrana is excused from work/school on 03/16/21 and 03/17/21. He is medically clear to return to work/school on 3/18/2021. Sincerely, Carlos Kelsey NP

## 2021-03-16 NOTE — ED PROVIDER NOTES
EMERGENCY DEPARTMENT HISTORY AND PHYSICAL EXAM      Date: 3/16/2021  Patient Name: Keyur De Leon    History of Presenting Illness     Chief Complaint   Patient presents with    Fall       History Provided By: Patient    HPI: Keyur De Leon, 61 y.o. male with a past medical history significant diabetes, hypertension, hyperlipidemia, obesity and osteoarthritis presents to the ED with cc of fall. Patient was at work last night when he tripped over a floor outlet as he was reaching for his keys. Patient states he fell forward. Patient caught himself with his left arm and right arm. Patient has left elbow pain left wrist pain right shoulder pain and left knee pain. Patient also has a small abrasion on his left arm. Patient not sure when his last tetanus was. Patient denies any dizziness or loss of consciousness. Specifically denies fever, chills, chest pain, shortness of breath, nausea, vomiting, diarrhea. Moderate severity, no known exacerbating or relieving factors, no other associated signs and symptoms    There are no other complaints, changes, or physical findings at this time. PCP: Jean Pritchard MD    No current facility-administered medications on file prior to encounter. Current Outpatient Medications on File Prior to Encounter   Medication Sig Dispense Refill    gabapentin (NEURONTIN) 300 mg capsule Take 1 Cap by mouth three (3) times daily. Max Daily Amount: 900 mg. 90 Cap 2    testosterone cypionate (DEPOTESTOTERONE CYPIONATE) 200 mg/mL injection 1 mL by IntraMUSCular route Once every 2 weeks. Max Daily Amount: 200 mg. 10 mL 3    omeprazole (PRILOSEC) 40 mg capsule Take 40 mg by mouth daily.  atorvastatin (LIPITOR) 20 mg tablet Take  by mouth daily.  empagliflozin (Jardiance) 10 mg tablet Take  by mouth daily.  metoprolol tartrate (LOPRESSOR) 100 mg IR tablet Take  by mouth two (2) times a day.  DULoxetine (CYMBALTA) 60 mg capsule Take 60 mg by mouth daily.  aspirin delayed-release 81 mg tablet Take  by mouth daily.  pantoprazole (PROTONIX) 40 mg tablet Take 40 mg by mouth daily.  ibuprofen 100 mg tablet Take 100 mg by mouth every six (6) hours as needed for Pain.  tadalafiL (CIALIS) 5 mg tablet Take 1 Tab by mouth daily. 80 Tab 5       Past History     Past Medical History:  Past Medical History:   Diagnosis Date    Diabetes ([address]     Hypercholesterolemia     Hypertension        Past Surgical History:  Past Surgical History:   Procedure Laterality Date    AL CARDIAC SURG PROCEDURE UNLIST      Atrial fibrilation        Family History:  Family History   Problem Relation Age of Onset    Cancer Father        Social History:  Social History     Tobacco Use    Smoking status: Former Smoker     Years: 3.00    Smokeless tobacco: Never Used   Substance Use Topics    Alcohol use: Yes    Drug use: Not Currently     Types: Marijuana       Allergies:  No Known Allergies      Review of Systems     Review of Systems   Constitutional: Negative for chills and fever. HENT: Negative for dental problem and sore throat. Eyes: Negative for pain and visual disturbance. Respiratory: Negative for cough and chest tightness. Cardiovascular: Negative for chest pain. Gastrointestinal: Negative for diarrhea and nausea. Genitourinary: Negative for difficulty urinating and frequency. Musculoskeletal: Positive for arthralgias. Negative for gait problem and joint swelling. Neurological: Negative for dizziness, syncope, numbness and headaches. Hematological: Negative for adenopathy. Does not bruise/bleed easily. Psychiatric/Behavioral: Negative for behavioral problems and suicidal ideas. Physical Exam     Physical Exam  Constitutional:       General: He is not in acute distress. Appearance: Normal appearance. He is not ill-appearing or toxic-appearing. HENT:      Head: Normocephalic and atraumatic.       Nose: Nose normal.      Mouth/Throat: Mouth: Mucous membranes are moist.   Eyes:      Extraocular Movements: Extraocular movements intact. Pupils: Pupils are equal, round, and reactive to light. Neck:      Musculoskeletal: Normal range of motion and neck supple. Cardiovascular:      Rate and Rhythm: Normal rate and regular rhythm. Pulmonary:      Effort: Pulmonary effort is normal.      Breath sounds: Normal breath sounds. Abdominal:      General: Bowel sounds are normal.   Musculoskeletal:      Right shoulder: He exhibits decreased range of motion and tenderness. Left elbow: Tenderness found. Left wrist: He exhibits tenderness. He exhibits no swelling and no effusion. Left knee: Tenderness found. Skin:     General: Skin is warm and dry. Capillary Refill: Capillary refill takes less than 2 seconds. Neurological:      General: No focal deficit present. Mental Status: He is alert and oriented to person, place, and time. Psychiatric:         Mood and Affect: Mood normal.         Behavior: Behavior normal.         Lab and Diagnostic Study Results     Labs -   No results found for this or any previous visit (from the past 12 hour(s)). Radiologic Studies -   @lastxrresult@  CT Results  (Last 48 hours)    None        CXR Results  (Last 48 hours)               03/16/21 1044  XR CHEST PORT Final result    Narrative:  Chest single view. Comparison single view chest June 28, 2020       Coarse reticular markings through the lungs appear similar compared to prior   imaging. No interstitial or alveolar pulmonary edema. Cardiac and mediastinal   structures unchanged. No pneumothorax or sizable pleural effusion. Medical Decision Making   - I am the first provider for this patient. - I reviewed the vital signs, available nursing notes, past medical history, past surgical history, family history and social history. - Initial assessment performed.  The patients presenting problems have been discussed, and they are in agreement with the care plan formulated and outlined with them. I have encouraged them to ask questions as they arise throughout their visit. Vital Signs-Reviewed the patient's vital signs. Patient Vitals for the past 12 hrs:   Temp Pulse Resp BP SpO2   03/16/21 1042 -- 90 16 (!) 153/103 98 %   03/16/21 0921 -- -- -- -- 98 %   03/16/21 0916 97.9 °F (36.6 °C) 97 16 (!) 153/94 98 %       Records Reviewed: Nursing Notes and Old Medical Records          ED Course:          Provider Notes (Medical Decision Making):   Patient presents after fall. Differential diagnosis include dislocation, fracture, sprain, contusion. Patient's tetanus brought up-to-date. Patient's x-ray is negative. Patient will be treated for contusion and sprain. MDM       Procedures   Medical Decision Makingedical Decision Making  Performed by: Aguila Navarro NP  PROCEDURES:  Procedures       Disposition   Disposition: DC- Adult Discharges: All of the diagnostic tests were reviewed and questions answered. Diagnosis, care plan and treatment options were discussed. The patient understands the instructions and will follow up as directed. The patients results have been reviewed with them. They have been counseled regarding their diagnosis. The patient verbally convey understanding and agreement of the signs, symptoms, diagnosis, treatment and prognosis and additionally agrees to follow up as recommended with their PCP in 24 - 48 hours. They also agree with the care-plan and convey that all of their questions have been answered. I have also put together some discharge instructions for them that include: 1) educational information regarding their diagnosis, 2) how to care for their diagnosis at home, as well a 3) list of reasons why they would want to return to the ED prior to their follow-up appointment, should their condition change. Discharged    DISCHARGE PLAN:  1.    Current Discharge Medication List CONTINUE these medications which have NOT CHANGED    Details   gabapentin (NEURONTIN) 300 mg capsule Take 1 Cap by mouth three (3) times daily. Max Daily Amount: 900 mg. Qty: 90 Cap, Refills: 2    Associated Diagnoses: Type 2 diabetes mellitus with diabetic polyneuropathy, without long-term current use of insulin (HCC)      testosterone cypionate (DEPOTESTOTERONE CYPIONATE) 200 mg/mL injection 1 mL by IntraMUSCular route Once every 2 weeks. Max Daily Amount: 200 mg. Qty: 10 mL, Refills: 3    Associated Diagnoses: Low testosterone in male      omeprazole (PRILOSEC) 40 mg capsule Take 40 mg by mouth daily. atorvastatin (LIPITOR) 20 mg tablet Take  by mouth daily. empagliflozin (Jardiance) 10 mg tablet Take  by mouth daily. metoprolol tartrate (LOPRESSOR) 100 mg IR tablet Take  by mouth two (2) times a day. DULoxetine (CYMBALTA) 60 mg capsule Take 60 mg by mouth daily. aspirin delayed-release 81 mg tablet Take  by mouth daily. pantoprazole (PROTONIX) 40 mg tablet Take 40 mg by mouth daily. ibuprofen 100 mg tablet Take 100 mg by mouth every six (6) hours as needed for Pain.      tadalafiL (CIALIS) 5 mg tablet Take 1 Tab by mouth daily. Qty: 90 Tab, Refills: 5           2. Follow-up Information     Follow up With Specialties Details Why Contact Info    Smita Galan MD Family Medicine   [address]  [place] [address] [facility]  [phone]          3. Return to ED if worse   4. Current Discharge Medication List      START taking these medications    Details   HYDROcodone-acetaminophen (Lorcet, HYDROcodone,) 5-325 mg per tablet Take 1 Tab by mouth every six (6) hours as needed for Pain for up to 3 days. Max Daily Amount: 4 Tabs.   Qty: 12 Tab, Refills: 0    Associated Diagnoses: Acute pain of right shoulder; Disorder of ligament of left wrist; Contusion of left knee, initial encounter         CONTINUE these medications which have NOT CHANGED    Details   ibuprofen 100 mg tablet Take 100 mg by mouth every six (6) hours as needed for Pain. Diagnosis     Clinical Impression:   1. Acute pain of right shoulder    2. Disorder of ligament of left wrist    3. Contusion of left knee, initial encounter        Attestations:    Mina Seymour NP    Please note that this dictation was completed with VILOOP, the computer voice recognition software. Quite often unanticipated grammatical, syntax, homophones, and other interpretive errors are inadvertently transcribed by the computer software. Please disregard these errors. Please excuse any errors that have escaped final proofreading. Thank you.

## 2021-03-22 ENCOUNTER — TELEPHONE (OUTPATIENT)
Dept: UROLOGY | Age: 61
End: 2021-03-22

## 2021-03-23 ENCOUNTER — OFFICE VISIT (OUTPATIENT)
Dept: UROLOGY | Age: 61
End: 2021-03-23
Payer: COMMERCIAL

## 2021-03-23 VITALS
OXYGEN SATURATION: 98 % | DIASTOLIC BLOOD PRESSURE: 78 MMHG | SYSTOLIC BLOOD PRESSURE: 160 MMHG | HEIGHT: 74 IN | TEMPERATURE: 98 F | HEART RATE: 70 BPM | WEIGHT: 254 LBS | RESPIRATION RATE: 20 BRPM | BODY MASS INDEX: 32.6 KG/M2

## 2021-03-23 DIAGNOSIS — N52.8 OTHER MALE ERECTILE DYSFUNCTION: ICD-10-CM

## 2021-03-23 DIAGNOSIS — R79.89 LOW TESTOSTERONE IN MALE: ICD-10-CM

## 2021-03-23 DIAGNOSIS — N40.0 BENIGN PROSTATIC HYPERPLASIA, UNSPECIFIED WHETHER LOWER URINARY TRACT SYMPTOMS PRESENT: ICD-10-CM

## 2021-03-23 LAB
BILIRUB UR QL STRIP: NEGATIVE
GLUCOSE UR-MCNC: NORMAL MG/DL
KETONES P FAST UR STRIP-MCNC: NEGATIVE MG/DL
PH UR STRIP: 6 [PH] (ref 4.6–8)
PROT UR QL STRIP: NORMAL
SP GR UR STRIP: 1.02 (ref 1–1.03)
UA UROBILINOGEN AMB POC: NORMAL (ref 0.2–1)
URINALYSIS CLARITY POC: CLEAR
URINALYSIS COLOR POC: YELLOW
URINE BLOOD POC: NEGATIVE
URINE LEUKOCYTES POC: NEGATIVE
URINE NITRITES POC: NEGATIVE

## 2021-03-23 PROCEDURE — 96372 THER/PROPH/DIAG INJ SC/IM: CPT | Performed by: UROLOGY

## 2021-03-23 PROCEDURE — 81003 URINALYSIS AUTO W/O SCOPE: CPT | Performed by: UROLOGY

## 2021-03-23 PROCEDURE — 99214 OFFICE O/P EST MOD 30 MIN: CPT | Performed by: UROLOGY

## 2021-03-23 RX ORDER — TESTOSTERONE CYPIONATE 200 MG/ML
200 INJECTION INTRAMUSCULAR
Status: COMPLETED | OUTPATIENT
Start: 2021-03-23 | End: 2021-03-23

## 2021-03-23 RX ADMIN — TESTOSTERONE CYPIONATE 200 MG: 200 INJECTION INTRAMUSCULAR at 18:00

## 2021-03-23 NOTE — PROGRESS NOTES
HISTORY OF PRESENT ILLNESS  Asher Goyal is a 61 y.o. male. Chief Complaint   Patient presents with    Follow-up    Benign Prostatic Hypertrophy    Hypogonadism     He started testosterone therapy 1/14/2021, and 2/2/21. He did not make it to another appt. He thought it was a noticeable difference. He notices his desires was stronger. His erections were firm, on tadalafil 5mg which he took twice a day. He fell at work last week. He has a right shoulder sling and left wrist brace. Chronic Conditions Addressed Today     1. Other male erectile dysfunction     Overview      11/11/2020: Starting summer 2020. He has less confidence in his erections. He can achieve 30-40% tumescence barely enough for penetration. He cannot maintain it. His interest is normal.  It is affecting his relationships. He has bothersome ED. Contributing factors are diabetes and HTN. He was started on tadalafil 5mg daily. Erections went from 2/10 to 4-5/10.     1/14/21: He was started on TRT, testosterone cypionate 200 mg IM q 2 weeks. Current Assessment & Plan      He is instructed to use tadalafil 5mg once daily. Testosterone supplementation may help. 2. Benign prostatic hyperplasia     Overview      11/11/2020: Moderate LUTS. Tadalafil may help some, 5 mg daily. Benny Shearer MD).  1/13/21: improved on tadalafil. Voiding with less frequency. Current Assessment & Plan      Stable on medication          Relevant Orders     AMB POC URINALYSIS DIP STICK AUTO W/O MICRO    3. Low testosterone in male     Overview      12/9/2020: serum testosterone was 210, though free testosterone was 8.9.  1/13/21: adequate free testosterone levels indicate that TRT may not help. However, he wanted to try. Started on 200 mg IM testosterone cypionate q 2 weeks on 1/14/21. Current Assessment & Plan      He thought it was beneficial. He has not continued his injections but wishes to restart today.   He will do better with scheduling. ROS    Physical Exam              ASSESSMENT and PLAN  Diagnoses and all orders for this visit:    1. Low testosterone in male  Assessment & Plan:  He thought it was beneficial. He has not continued his injections but wishes to restart today. He will do better with scheduling. 2. Benign prostatic hyperplasia, unspecified whether lower urinary tract symptoms present  Assessment & Plan:  Stable on medication    Orders:  -     AMB POC URINALYSIS DIP STICK AUTO W/O MICRO    3. Other male erectile dysfunction  Assessment & Plan:  He is instructed to use tadalafil 5mg once daily. Testosterone supplementation may help. Follow-up and Dispositions    · Return in about 6 weeks (around 5/4/2021) for Follow up after testing, labs prior.          Chivo Shah MD

## 2021-03-23 NOTE — PROGRESS NOTES
1. Have you been to the ER, urgent care clinic since your last visit? Hospitalized since your last visit? [facility]    2. Have you seen or consulted any other health care providers outside of the [address] [place] [address] since your last visit? Include any pap smears or colon screening.  PCP   Chief Complaint   Patient presents with    Follow-up    Benign Prostatic Hypertrophy    Hypogonadism     Visit Vitals  BP (!) 160/78 (BP 1 Location: Left arm, BP Patient Position: Sitting, BP Cuff Size: Adult)   Pulse 70   Temp 98 °F (36.7 °C) (Temporal)   Resp 20   Ht 6' 2\" (1.88 m)   Wt 254 lb (115.2 kg)   SpO2 98%   BMI 32.61 kg/m²

## 2021-03-23 NOTE — ASSESSMENT & PLAN NOTE
He thought it was beneficial. He has not continued his injections but wishes to restart today. He will do better with scheduling.

## 2021-05-04 ENCOUNTER — TELEPHONE (OUTPATIENT)
Dept: UROLOGY | Age: 61
End: 2021-05-04

## 2021-05-07 ENCOUNTER — CLINICAL SUPPORT (OUTPATIENT)
Dept: UROLOGY | Age: 61
End: 2021-05-07
Payer: COMMERCIAL

## 2021-05-07 ENCOUNTER — TELEPHONE (OUTPATIENT)
Dept: UROLOGY | Age: 61
End: 2021-05-07

## 2021-05-07 VITALS
HEART RATE: 92 BPM | RESPIRATION RATE: 12 BRPM | DIASTOLIC BLOOD PRESSURE: 98 MMHG | SYSTOLIC BLOOD PRESSURE: 161 MMHG | OXYGEN SATURATION: 97 %

## 2021-05-07 DIAGNOSIS — R79.89 LOW TESTOSTERONE IN MALE: Primary | ICD-10-CM

## 2021-05-07 PROCEDURE — 96372 THER/PROPH/DIAG INJ SC/IM: CPT | Performed by: UROLOGY

## 2021-05-07 RX ORDER — TESTOSTERONE CYPIONATE 200 MG/ML
200 INJECTION INTRAMUSCULAR
Status: COMPLETED | OUTPATIENT
Start: 2021-05-07 | End: 2021-05-07

## 2021-05-07 RX ORDER — LISINOPRIL 5 MG/1
5 TABLET ORAL DAILY
COMMUNITY
Start: 2021-03-11

## 2021-05-07 RX ADMIN — TESTOSTERONE CYPIONATE 200 MG: 200 INJECTION INTRAMUSCULAR at 13:47

## 2021-05-07 NOTE — TELEPHONE ENCOUNTER
Patient says they were supposed to have an appt today 05/07 for testosterone injection, However he is not on the schedule. Can we put him on for this afternoon for a NV, or Monday for a NV?   Thanks

## 2021-05-07 NOTE — TELEPHONE ENCOUNTER
Pt also already has an appt 05/13 for 6 wk f/up with Mary, unless he can just do his injection then?   Thanks

## 2021-05-10 ENCOUNTER — TELEPHONE (OUTPATIENT)
Dept: UROLOGY | Age: 61
End: 2021-05-10

## 2021-05-18 LAB
BASOPHILS # BLD AUTO: 0 X10E3/UL (ref 0–0.2)
BASOPHILS NFR BLD AUTO: 1 %
BUN SERPL-MCNC: 8 MG/DL (ref 8–27)
BUN/CREAT SERPL: 12 (ref 10–24)
CALCIUM SERPL-MCNC: 9.6 MG/DL (ref 8.6–10.2)
CHLORIDE SERPL-SCNC: 99 MMOL/L (ref 96–106)
CO2 SERPL-SCNC: 20 MMOL/L (ref 20–29)
CREAT SERPL-MCNC: 0.65 MG/DL (ref 0.76–1.27)
EOSINOPHIL # BLD AUTO: 0.2 X10E3/UL (ref 0–0.4)
EOSINOPHIL NFR BLD AUTO: 2 %
ERYTHROCYTE [DISTWIDTH] IN BLOOD BY AUTOMATED COUNT: 13.1 % (ref 11.6–15.4)
GLUCOSE SERPL-MCNC: 311 MG/DL (ref 65–99)
HCT VFR BLD AUTO: 43 % (ref 37.5–51)
HGB BLD-MCNC: 15.3 G/DL (ref 13–17.7)
IMM GRANULOCYTES # BLD AUTO: 0 X10E3/UL (ref 0–0.1)
IMM GRANULOCYTES NFR BLD AUTO: 1 %
LYMPHOCYTES # BLD AUTO: 2.1 X10E3/UL (ref 0.7–3.1)
LYMPHOCYTES NFR BLD AUTO: 32 %
MCH RBC QN AUTO: 30.4 PG (ref 26.6–33)
MCHC RBC AUTO-ENTMCNC: 35.6 G/DL (ref 31.5–35.7)
MCV RBC AUTO: 85 FL (ref 79–97)
MONOCYTES # BLD AUTO: 0.4 X10E3/UL (ref 0.1–0.9)
MONOCYTES NFR BLD AUTO: 6 %
NEUTROPHILS # BLD AUTO: 3.8 X10E3/UL (ref 1.4–7)
NEUTROPHILS NFR BLD AUTO: 58 %
PLATELET # BLD AUTO: 234 X10E3/UL (ref 150–450)
POTASSIUM SERPL-SCNC: 3.7 MMOL/L (ref 3.5–5.2)
RBC # BLD AUTO: 5.04 X10E6/UL (ref 4.14–5.8)
SODIUM SERPL-SCNC: 139 MMOL/L (ref 134–144)
TESTOST FREE SERPL-MCNC: 28.8 PG/ML (ref 6.6–18.1)
TESTOST SERPL-MCNC: 605 NG/DL (ref 264–916)
WBC # BLD AUTO: 6.5 X10E3/UL (ref 3.4–10.8)

## 2021-05-21 NOTE — PROGRESS NOTES
We need to get him scheduled for a follow up per Dr. Praveen Diamond. A virtual visit is fine, or he can see either of us to touch base and review dosing and labs.

## 2021-06-02 RX ORDER — NAPROXEN 500 MG/1
TABLET ORAL
COMMUNITY
Start: 2021-04-20 | End: 2022-05-17

## 2021-06-08 NOTE — PROGRESS NOTES
HISTORY OF PRESENT ILLNESS  Kev Ramachandran is a 61 y.o. male. Chief Complaint   Patient presents with    Follow-up    Benign Prostatic Hypertrophy     Pt had trouble connecting to [facility]. He elected to reschedule. No office visit today. Chart summary:     He has a hx of low testosterone and TRT. 12/9/2020: serum testosterone was 210, though free testosterone was 8.9.  1/13/21: adequate free testosterone levels indicate that TRT may not help. However, he wanted to try. Started on 200 mg IM testosterone cypionate q 2 weeks on 1/14/21. He was not consistently injecting. He wished to restart therapy on 3/23/21. Repeat labs on 5/13/21 showed a serum T of 605 with a free testosterone value of 28.8. BMP done, no hepatic panel. CBC OK. Chronic Conditions Addressed Today     1. Low testosterone in male     Overview      12/9/2020: serum testosterone was 210, though free testosterone was 8.9.  1/13/21: adequate free testosterone levels indicate that TRT may not help. However, he wanted to try. Started on 200 mg IM testosterone cypionate q 2 weeks on 1/14/21. He was not consistently injecting. He wished to restart therapy on 3/23/21. Current Assessment & Plan       Therapeutic labs. Needs monitoring. Plan T f/t, LFTs, PSA in 3m          Relevant Orders     HEPATIC FUNCTION PANEL     PSA, DIAGNOSTIC (PROSTATE SPECIFIC AG)     TESTOSTERONE, FREE & TOTAL          Patient denies the symptoms of COVID-19 per routine screening guidelines. ROS    Past Medical History:  PMHx (including negatives):  has a past medical history of Diabetes ([address] Hypercholesterolemia, and Hypertension. PSurgHx:  has a past surgical history that includes pr cardiac surg procedure unlist and hx shoulder arthroscopy (Right, 06/09/2021). PSocHx:  reports that he has quit smoking. He has a 0.75 pack-year smoking history.  He has never used smokeless tobacco. He reports current alcohol use of about 6.0 standard drinks of alcohol per week. He reports previous drug use. Drugs: Marijuana and Cocaine. Physical Exam    ASSESSMENT and PLAN  Diagnoses and all orders for this visit:    1. Low testosterone in male  Assessment & Plan:   Therapeutic labs. Needs monitoring. Plan T f/t, LFTs, PSA in 3m    Orders:  -     HEPATIC FUNCTION PANEL; Future  -     PSA, DIAGNOSTIC (PROSTATE SPECIFIC AG); Future  -     TESTOSTERONE, FREE & TOTAL;  Future             Issa Yates MD

## 2021-06-10 ENCOUNTER — VIRTUAL VISIT (OUTPATIENT)
Dept: UROLOGY | Age: 61
End: 2021-06-10

## 2021-06-10 DIAGNOSIS — R79.89 LOW TESTOSTERONE IN MALE: ICD-10-CM

## 2021-06-10 RX ORDER — OXYCODONE HYDROCHLORIDE 5 MG/1
TABLET ORAL
COMMUNITY
Start: 2021-06-09 | End: 2021-10-06 | Stop reason: ALTCHOICE

## 2021-06-10 RX ORDER — DOXYCYCLINE 100 MG/1
CAPSULE ORAL
COMMUNITY
Start: 2021-06-09 | End: 2022-05-17

## 2021-06-10 NOTE — PROGRESS NOTES
1. Have you been to the ER, urgent care clinic since your last visit? Hospitalized since your last visit? No    2. Have you seen or consulted any other health care providers outside of the [address] [place] [address] since your last visit? Include any pap smears or colon screening.  [facility]

## 2021-06-16 ENCOUNTER — APPOINTMENT (OUTPATIENT)
Dept: GENERAL RADIOLOGY | Age: 61
End: 2021-06-16
Attending: PHYSICIAN ASSISTANT
Payer: COMMERCIAL

## 2021-06-16 ENCOUNTER — HOSPITAL ENCOUNTER (EMERGENCY)
Age: 61
Discharge: HOME | End: 2021-06-16
Payer: COMMERCIAL

## 2021-06-16 VITALS
HEART RATE: 91 BPM | DIASTOLIC BLOOD PRESSURE: 87 MMHG | SYSTOLIC BLOOD PRESSURE: 137 MMHG | BODY MASS INDEX: 32.85 KG/M2 | HEIGHT: 74 IN | OXYGEN SATURATION: 95 % | TEMPERATURE: 97.9 F | RESPIRATION RATE: 18 BRPM | WEIGHT: 256 LBS

## 2021-06-16 DIAGNOSIS — R35.89 POLYURIA: ICD-10-CM

## 2021-06-16 DIAGNOSIS — E11.65 CONTROLLED TYPE 2 DIABETES MELLITUS WITH HYPERGLYCEMIA, WITH LONG-TERM CURRENT USE OF INSULIN (HCC): Primary | ICD-10-CM

## 2021-06-16 DIAGNOSIS — Z79.4 CONTROLLED TYPE 2 DIABETES MELLITUS WITH HYPERGLYCEMIA, WITH LONG-TERM CURRENT USE OF INSULIN (HCC): Primary | ICD-10-CM

## 2021-06-16 LAB
ALBUMIN SERPL-MCNC: 3.9 G/DL (ref 3.5–5)
ALBUMIN/GLOB SERPL: 0.9 {RATIO} (ref 1.1–2.2)
ALP SERPL-CCNC: 91 U/L (ref 45–117)
ALT SERPL-CCNC: 34 U/L (ref 12–78)
ANION GAP SERPL CALC-SCNC: 11 MMOL/L (ref 5–15)
APPEARANCE UR: CLEAR
AST SERPL W P-5'-P-CCNC: 16 U/L (ref 15–37)
BACTERIA URNS QL MICRO: NEGATIVE /HPF
BASOPHILS # BLD: 0 K/UL (ref 0–0.1)
BASOPHILS NFR BLD: 0 % (ref 0–1)
BILIRUB SERPL-MCNC: 0.7 MG/DL (ref 0.2–1)
BILIRUB UR QL: NEGATIVE
BUN SERPL-MCNC: 17 MG/DL (ref 6–20)
BUN/CREAT SERPL: 18 (ref 12–20)
CA-I BLD-MCNC: 9.9 MG/DL (ref 8.5–10.1)
CHLORIDE SERPL-SCNC: 96 MMOL/L (ref 97–108)
CO2 SERPL-SCNC: 25 MMOL/L (ref 21–32)
COLOR UR: ABNORMAL
CREAT SERPL-MCNC: 0.95 MG/DL (ref 0.7–1.3)
DIFFERENTIAL METHOD BLD: NORMAL
EOSINOPHIL # BLD: 0.2 K/UL (ref 0–0.4)
EOSINOPHIL NFR BLD: 3 % (ref 0–7)
ERYTHROCYTE [DISTWIDTH] IN BLOOD BY AUTOMATED COUNT: 12.2 % (ref 11.5–14.5)
GLOBULIN SER CALC-MCNC: 4.2 G/DL (ref 2–4)
GLUCOSE BLD STRIP.AUTO-MCNC: 304 MG/DL (ref 65–117)
GLUCOSE BLD STRIP.AUTO-MCNC: 313 MG/DL (ref 65–117)
GLUCOSE BLD STRIP.AUTO-MCNC: 426 MG/DL (ref 65–117)
GLUCOSE SERPL-MCNC: 433 MG/DL (ref 65–100)
GLUCOSE UR STRIP.AUTO-MCNC: >300 MG/DL
HCT VFR BLD AUTO: 41.3 % (ref 36.6–50.3)
HGB BLD-MCNC: 14.4 G/DL (ref 12.1–17)
HGB UR QL STRIP: NEGATIVE
IMM GRANULOCYTES # BLD AUTO: 0 K/UL (ref 0–0.04)
IMM GRANULOCYTES NFR BLD AUTO: 0 % (ref 0–0.5)
KETONES UR QL STRIP.AUTO: NEGATIVE MG/DL
LEUKOCYTE ESTERASE UR QL STRIP.AUTO: NEGATIVE
LYMPHOCYTES # BLD: 2.2 K/UL (ref 0.8–3.5)
LYMPHOCYTES NFR BLD: 34 % (ref 12–49)
MCH RBC QN AUTO: 29.8 PG (ref 26–34)
MCHC RBC AUTO-ENTMCNC: 34.9 G/DL (ref 30–36.5)
MCV RBC AUTO: 85.3 FL (ref 80–99)
MONOCYTES # BLD: 0.4 K/UL (ref 0–1)
MONOCYTES NFR BLD: 7 % (ref 5–13)
NEUTS SEG # BLD: 3.7 K/UL (ref 1.8–8)
NEUTS SEG NFR BLD: 56 % (ref 32–75)
NITRITE UR QL STRIP.AUTO: NEGATIVE
NRBC # BLD: 0 K/UL (ref 0–0.01)
NRBC BLD-RTO: 0 PER 100 WBC
PERFORMED BY, TECHID: ABNORMAL
PH UR STRIP: 7 [PH] (ref 5–8)
PLATELET # BLD AUTO: 266 K/UL (ref 150–400)
PMV BLD AUTO: 11 FL (ref 8.9–12.9)
POTASSIUM SERPL-SCNC: 3.7 MMOL/L (ref 3.5–5.1)
PROT SERPL-MCNC: 8.1 G/DL (ref 6.4–8.2)
PROT UR STRIP-MCNC: NEGATIVE MG/DL
RBC # BLD AUTO: 4.84 M/UL (ref 4.1–5.7)
RBC #/AREA URNS HPF: ABNORMAL /HPF (ref 0–5)
SODIUM SERPL-SCNC: 132 MMOL/L (ref 136–145)
SP GR UR REFRACTOMETRY: 1.03 (ref 1–1.03)
UA: UC IF INDICATED,UAUC: ABNORMAL
UROBILINOGEN UR QL STRIP.AUTO: 0.1 EU/DL (ref 0.1–1)
WBC # BLD AUTO: 6.5 K/UL (ref 4.1–11.1)
WBC URNS QL MICRO: ABNORMAL /HPF (ref 0–4)

## 2021-06-16 PROCEDURE — 96374 THER/PROPH/DIAG INJ IV PUSH: CPT

## 2021-06-16 PROCEDURE — 87086 URINE CULTURE/COLONY COUNT: CPT

## 2021-06-16 PROCEDURE — 82962 GLUCOSE BLOOD TEST: CPT

## 2021-06-16 PROCEDURE — 36415 COLL VENOUS BLD VENIPUNCTURE: CPT

## 2021-06-16 PROCEDURE — 96376 TX/PRO/DX INJ SAME DRUG ADON: CPT

## 2021-06-16 PROCEDURE — 74018 RADEX ABDOMEN 1 VIEW: CPT

## 2021-06-16 PROCEDURE — 74011250636 HC RX REV CODE- 250/636: Performed by: EMERGENCY MEDICINE

## 2021-06-16 PROCEDURE — 85025 COMPLETE CBC W/AUTO DIFF WBC: CPT

## 2021-06-16 PROCEDURE — 80053 COMPREHEN METABOLIC PANEL: CPT

## 2021-06-16 PROCEDURE — 99284 EMERGENCY DEPT VISIT MOD MDM: CPT

## 2021-06-16 PROCEDURE — 81001 URINALYSIS AUTO W/SCOPE: CPT

## 2021-06-16 PROCEDURE — 74011636637 HC RX REV CODE- 636/637: Performed by: PHYSICIAN ASSISTANT

## 2021-06-16 RX ADMIN — INSULIN HUMAN 4 UNITS: 100 INJECTION, SOLUTION PARENTERAL at 09:04

## 2021-06-16 RX ADMIN — INSULIN HUMAN 2 UNITS: 100 INJECTION, SOLUTION PARENTERAL at 10:56

## 2021-06-16 RX ADMIN — SODIUM CHLORIDE 1000 ML: 9 INJECTION, SOLUTION INTRAVENOUS at 09:05

## 2021-06-16 NOTE — ED PROVIDER NOTES
EMERGENCY DEPARTMENT HISTORY AND PHYSICAL EXAM      Date: 6/16/2021  Patient Name: Elke Telles    History of Presenting Illness     Chief Complaint   Patient presents with    Urinary Frequency       History Provided By: Patient    HPI: Elke Telles, 61 y.o. male with a past medical history significant diabetes, hypertension and hyperlipidemia, recent right rotator cuff repair surgery presents to the ED with cc of frequency in urination over the last couple of days. Patient reports he has been getting up to urinate 8-10 times a night. Associated symptoms include polydipsia. He is also complaining of constipation. He reports that he is still eating the same however has not had a bowel movement in a few days. He reports compliance with his medications aside from his insulin. He states that he has missed a few doses over the last few days due to stress. He has also not been checking his sugars at home. He specifically denies chest pain, shortness of breath, fever, chills, body aches, recent illness, abdominal pain, dysuria, hematuria, nausea, vomiting. There are no other complaints, changes, or physical findings at this time. PCP: Milady Sullivan MD    Current Outpatient Medications   Medication Sig Dispense Refill    oxyCODONE IR (ROXICODONE) 5 mg immediate release tablet       doxycycline (VIBRAMYCIN) 100 mg capsule       naproxen (NAPROSYN) 500 mg tablet       lisinopriL (PRINIVIL, ZESTRIL) 5 mg tablet       gabapentin (NEURONTIN) 300 mg capsule Take 1 Cap by mouth three (3) times daily. Max Daily Amount: 900 mg. 90 Cap 2    testosterone cypionate (DEPOTESTOTERONE CYPIONATE) 200 mg/mL injection 1 mL by IntraMUSCular route Once every 2 weeks. Max Daily Amount: 200 mg. 10 mL 3    omeprazole (PRILOSEC) 40 mg capsule Take 40 mg by mouth daily.  atorvastatin (LIPITOR) 20 mg tablet Take  by mouth daily.  empagliflozin (Jardiance) 10 mg tablet Take  by mouth daily.       metoprolol tartrate (LOPRESSOR) 100 mg IR tablet Take  by mouth two (2) times a day.  DULoxetine (CYMBALTA) 60 mg capsule Take 60 mg by mouth daily.  aspirin delayed-release 81 mg tablet Take  by mouth daily.  pantoprazole (PROTONIX) 40 mg tablet Take 40 mg by mouth daily.  ibuprofen 100 mg tablet Take 100 mg by mouth every six (6) hours as needed for Pain.  tadalafiL (CIALIS) 5 mg tablet Take 1 Tab by mouth daily. 80 Tab 5       Past History     Past Medical History:  Past Medical History:   Diagnosis Date    Diabetes ([address]     Hypercholesterolemia     Hypertension        Past Surgical History:  Past Surgical History:   Procedure Laterality Date    HX SHOULDER ARTHROSCOPY Right 06/09/2021    [place] CARDIAC SURG PROCEDURE UNLIST      Atrial fibrilation        Family History:  Family History   Problem Relation Age of Onset    Cancer Father        Social History:  Social History     Tobacco Use    Smoking status: Former Smoker     Packs/day: 0.25     Years: 3.00     Pack years: 0.75    Smokeless tobacco: Never Used   Vaping Use    Vaping Use: Never used   Substance Use Topics    Alcohol use: Yes     Alcohol/week: 6.0 standard drinks     Types: 6 Cans of beer per week    Drug use: Not Currently     Types: Marijuana, Cocaine       Allergies:  No Known Allergies      Review of Systems   Review of Systems   Constitutional: Negative for activity change, chills and fever. HENT: Negative for congestion, ear pain, rhinorrhea and trouble swallowing. Eyes: Negative for pain and visual disturbance. Respiratory: Negative for cough and shortness of breath. Cardiovascular: Negative for chest pain. Gastrointestinal: Positive for constipation. Negative for abdominal pain, diarrhea, nausea and vomiting. Endocrine: Positive for polydipsia and polyuria. Negative for polyphagia. Genitourinary: Negative for decreased urine volume, difficulty urinating, dysuria and hematuria. Musculoskeletal: Negative for arthralgias and myalgias. Skin: Negative for rash. Neurological: Negative for weakness and headaches. Hematological: Negative for adenopathy. Psychiatric/Behavioral: The patient is not nervous/anxious. All other systems reviewed and are negative. Physical Exam   Physical Exam  Vitals and nursing note reviewed. Constitutional:       General: He is not in acute distress. Appearance: He is well-developed. He is not ill-appearing. HENT:      Head: Normocephalic and atraumatic. Mouth/Throat:      Mouth: Mucous membranes are dry. Eyes:      Extraocular Movements: Extraocular movements intact. Pupils: Pupils are equal, round, and reactive to light. Cardiovascular:      Rate and Rhythm: Normal rate and regular rhythm. Heart sounds: Normal heart sounds. Pulmonary:      Effort: Pulmonary effort is normal. No respiratory distress. Breath sounds: Normal breath sounds. Abdominal:      General: Abdomen is flat. Bowel sounds are normal. There is no distension. Palpations: Abdomen is soft. Tenderness: There is no abdominal tenderness. Musculoskeletal:      Comments: Right shoulder: in sling, +ecchymosis noted to right upper arm, 6 sutures in place, surgical incision sites well-healing   Skin:     General: Skin is warm and dry. Capillary Refill: Capillary refill takes less than 2 seconds. Findings: No rash. Neurological:      General: No focal deficit present. Mental Status: He is alert. Cranial Nerves: No cranial nerve deficit.    Psychiatric:         Mood and Affect: Mood normal.         Behavior: Behavior normal.         Diagnostic Study Results     Labs -     Recent Results (from the past 100 hour(s))   GLUCOSE, POC    Collection Time: 06/16/21  8:05 AM   Result Value Ref Range    Glucose (POC) 426 (H) 65 - 117 mg/dL    Performed by LAURA KIDD    CBC WITH AUTOMATED DIFF    Collection Time: 06/16/21  8:15 AM Result Value Ref Range    WBC 6.5 4.1 - 11.1 K/uL    RBC 4.84 4.10 - 5.70 M/uL    HGB 14.4 12.1 - 17.0 g/dL    HCT 41.3 36.6 - 50.3 %    MCV 85.3 80.0 - 99.0 FL    MCH 29.8 26.0 - 34.0 PG    MCHC 34.9 30.0 - 36.5 g/dL    RDW 12.2 11.5 - 14.5 %    PLATELET 728 977 - 148 K/uL    MPV 11.0 8.9 - 12.9 FL    NRBC 0.0 0.0  WBC    ABSOLUTE NRBC 0.00 0.00 - 0.01 K/uL    NEUTROPHILS 56 32 - 75 %    LYMPHOCYTES 34 12 - 49 %    MONOCYTES 7 5 - 13 %    EOSINOPHILS 3 0 - 7 %    BASOPHILS 0 0 - 1 %    IMMATURE GRANULOCYTES 0 0 - 0.5 %    ABS. NEUTROPHILS 3.7 1.8 - 8.0 K/UL    ABS. LYMPHOCYTES 2.2 0.8 - 3.5 K/UL    ABS. MONOCYTES 0.4 0.0 - 1.0 K/UL    ABS. EOSINOPHILS 0.2 0.0 - 0.4 K/UL    ABS. BASOPHILS 0.0 0.0 - 0.1 K/UL    ABS. IMM. GRANS. 0.0 0.00 - 0.04 K/UL    DF AUTOMATED     METABOLIC PANEL, COMPREHENSIVE    Collection Time: 06/16/21  8:15 AM   Result Value Ref Range    Sodium 132 (L) 136 - 145 mmol/L    Potassium 3.7 3.5 - 5.1 mmol/L    Chloride 96 (L) 97 - 108 mmol/L    CO2 25 21 - 32 mmol/L    Anion gap 11 5 - 15 mmol/L    Glucose 433 (H) 65 - 100 mg/dL    BUN 17 6 - 20 mg/dL    Creatinine 0.95 0.70 - 1.30 mg/dL    BUN/Creatinine ratio 18 12 - 20      GFR est AA >60 >60 ml/min/1.73m2    GFR est non-AA >60 >60 ml/min/1.73m2    Calcium 9.9 8.5 - 10.1 mg/dL    Bilirubin, total 0.7 0.2 - 1.0 mg/dL    AST (SGOT) 16 15 - 37 U/L    ALT (SGPT) 34 12 - 78 U/L    Alk.  phosphatase 91 45 - 117 U/L    Protein, total 8.1 6.4 - 8.2 g/dL    Albumin 3.9 3.5 - 5.0 g/dL    Globulin 4.2 (H) 2.0 - 4.0 g/dL    A-G Ratio 0.9 (L) 1.1 - 2.2     URINALYSIS W/ REFLEX CULTURE    Collection Time: 06/16/21  8:15 AM    Specimen: Urine   Result Value Ref Range    Color Yellow/Straw      Appearance Clear Clear      Specific gravity 1.028 1.003 - 1.030      pH (UA) 7.0 5.0 - 8.0      Protein Negative Negative mg/dL    Glucose >300 (A) Negative mg/dL    Ketone Negative Negative mg/dL    Bilirubin Negative Negative      Blood Negative Negative Urobilinogen 0.1 0.1 - 1.0 EU/dL    Nitrites Negative Negative      Leukocyte Esterase Negative Negative      UA:UC IF INDICATED Urine Culture Ordered (A) Culture not indicated by UA result      WBC 0-5 0 - 4 /hpf    RBC 0-5 0 - 5 /hpf    Bacteria Negative Negative /hpf   CULTURE, URINE    Collection Time: 06/16/21  8:15 AM    Specimen: Urine   Result Value Ref Range    Special Requests: No Special Requests  Reflexed from [id number]        Culture result: No significant growth, <10,000 CFU/mL     GLUCOSE, POC    Collection Time: 06/16/21  9:54 AM   Result Value Ref Range    Glucose (POC) 313 (H) 65 - 117 mg/dL    Performed by [address] POC    Collection Time: 06/16/21 12:29 PM   Result Value Ref Range    Glucose (POC) 304 (H) 65 - 117 mg/dL    Performed by [facility]        Radiologic Studies -   XR Results (most recent):  Results from Hospital Encounter encounter on 06/16/21    XR ABD (KUB)    Narrative  Abdomen, 3 views    Some stool and air present through colon. No plain film evidence for bowel  obstruction. CT Results  (Last 48 hours)    None            Medical Decision Making and ED Course   I am the first provider for this patient. I reviewed the vital signs, available nursing notes, past medical history, past surgical history, family history and social history. Vital Signs-Reviewed the patient's vital signs. Wt Readings from Last 3 Encounters:   06/16/21 116.1 kg (256 lb)   03/23/21 115.2 kg (254 lb)   03/16/21 111.6 kg (246 lb)     Temp Readings from Last 3 Encounters:   06/16/21 97.9 °F (36.6 °C)   03/23/21 98 °F (36.7 °C) (Temporal)   03/16/21 97.9 °F (36.6 °C)     BP Readings from Last 3 Encounters:   06/16/21 137/87   05/07/21 (!) 161/98   03/23/21 (!) 160/78     Pulse Readings from Last 3 Encounters:   06/16/21 91   05/07/21 92   03/23/21 70       No data found.     Records Reviewed: Nursing Notes, Old Medical Records, Previous Radiology Studies and Previous Laboratory Studies    Provider Notes (Medical Decision Making):       MDM  Number of Diagnoses or Management Options  Controlled type 2 diabetes mellitus with hyperglycemia, with long-term current use of insulin ([facility] [address]  Polyuria  Diagnosis management comments: Differentials include UTI, BPH, hyperglycemia, DKA       Amount and/or Complexity of Data Reviewed  Clinical lab tests: ordered and reviewed  Review and summarize past medical records: yes          ED Course:   Initial assessment performed. The patients presenting problems have been discussed, and they are in agreement with the care plan formulated and outlined with them. I have encouraged them to ask questions as they arise throughout their visit. Procedures       Chaim Reynoso PA-C    Procedures   Chaim Reynoso PA-C        Disposition     Disposition: DC- Adult Discharges: All of the diagnostic tests were reviewed and questions answered. Diagnosis, care plan and treatment options were discussed. The patient understands the instructions and will follow up as directed. The patients results have been reviewed with them. They have been counseled regarding their diagnosis. The patient verbally convey understanding and agreement of the signs, symptoms, diagnosis, treatment and prognosis and additionally agrees to follow up as recommended with their PCP in 24 - 48 hours. They also agree with the care-plan and convey that all of their questions have been answered. I have also put together some discharge instructions for them that include: 1) educational information regarding their diagnosis, 2) how to care for their diagnosis at home, as well a 3) list of reasons why they would want to return to the ED prior to their follow-up appointment, should their condition change. Discharged     DISCHARGE PLAN:  1.    Current Discharge Medication List      CONTINUE these medications which have NOT CHANGED    Details   oxyCODONE IR (ROXICODONE) 5 mg immediate release tablet       doxycycline (VIBRAMYCIN) 100 mg capsule       naproxen (NAPROSYN) 500 mg tablet       lisinopriL (PRINIVIL, ZESTRIL) 5 mg tablet       gabapentin (NEURONTIN) 300 mg capsule Take 1 Cap by mouth three (3) times daily. Max Daily Amount: 900 mg. Qty: 90 Cap, Refills: 2    Associated Diagnoses: Type 2 diabetes mellitus with diabetic polyneuropathy, without long-term current use of insulin ([facility])      testosterone cypionate (DEPOTESTOTERONE CYPIONATE) 200 mg/mL injection 1 mL by IntraMUSCular route Once every 2 weeks. Max Daily Amount: 200 mg. Qty: 10 mL, Refills: 3    Associated Diagnoses: Low testosterone in male      omeprazole (PRILOSEC) 40 mg capsule Take 40 mg by mouth daily. atorvastatin (LIPITOR) 20 mg tablet Take  by mouth daily. empagliflozin (Jardiance) 10 mg tablet Take  by mouth daily. metoprolol tartrate (LOPRESSOR) 100 mg IR tablet Take  by mouth two (2) times a day. DULoxetine (CYMBALTA) 60 mg capsule Take 60 mg by mouth daily. aspirin delayed-release 81 mg tablet Take  by mouth daily. pantoprazole (PROTONIX) 40 mg tablet Take 40 mg by mouth daily. ibuprofen 100 mg tablet Take 100 mg by mouth every six (6) hours as needed for Pain.      tadalafiL (CIALIS) 5 mg tablet Take 1 Tab by mouth daily. Qty: 90 Tab, Refills: 5           2. Follow-up Information     Follow up With Specialties Details Why Contact Info    Amado Restrepo MD Family Medicine Schedule an appointment as soon as possible for a visit  for follow up from ER visit [address] [place] [address] [address]      [address] [facility] EMERGENCY DEPT Emergency Medicine  As needed, If symptoms worsen [address] [facility] [address] [phone]        3. Return to ED if worse   4. Discharge Medication List as of 6/16/2021 12:52 PM          Diagnosis     Clinical Impression:   1.  Controlled type 2 diabetes mellitus with hyperglycemia, with long-term current use of insulin ([facility] [address] 2. Polyuria        Attestations:    Sheryl Arreola PA-C    Please note that this dictation was completed with fotobabble, the computer voice recognition software. Quite often unanticipated grammatical, syntax, homophones, and other interpretive errors are inadvertently transcribed by the computer software. Please disregard these errors. Please excuse any errors that have escaped final proofreading. Thank you.

## 2021-06-17 LAB
BACTERIA SPEC CULT: NORMAL
SPECIAL REQUESTS,SREQ: NORMAL

## 2021-06-22 ENCOUNTER — APPOINTMENT (OUTPATIENT)
Dept: GENERAL RADIOLOGY | Age: 61
End: 2021-06-22
Attending: EMERGENCY MEDICINE
Payer: COMMERCIAL

## 2021-06-22 ENCOUNTER — HOSPITAL ENCOUNTER (EMERGENCY)
Age: 61
Discharge: HOME | End: 2021-06-22
Attending: EMERGENCY MEDICINE
Payer: COMMERCIAL

## 2021-06-22 VITALS
HEIGHT: 74 IN | HEART RATE: 95 BPM | RESPIRATION RATE: 20 BRPM | OXYGEN SATURATION: 97 % | BODY MASS INDEX: 32.85 KG/M2 | DIASTOLIC BLOOD PRESSURE: 95 MMHG | SYSTOLIC BLOOD PRESSURE: 149 MMHG | TEMPERATURE: 98 F | WEIGHT: 256 LBS

## 2021-06-22 DIAGNOSIS — E08.65 DIABETES MELLITUS DUE TO UNDERLYING CONDITION WITH HYPERGLYCEMIA, WITH LONG-TERM CURRENT USE OF INSULIN (HCC): Primary | ICD-10-CM

## 2021-06-22 DIAGNOSIS — Z79.4 DIABETES MELLITUS DUE TO UNDERLYING CONDITION WITH HYPERGLYCEMIA, WITH LONG-TERM CURRENT USE OF INSULIN (HCC): Primary | ICD-10-CM

## 2021-06-22 LAB
ALBUMIN SERPL-MCNC: 4 G/DL (ref 3.5–5)
ALBUMIN/GLOB SERPL: 0.9 {RATIO} (ref 1.1–2.2)
ALP SERPL-CCNC: 106 U/L (ref 45–117)
ALT SERPL-CCNC: 42 U/L (ref 12–78)
ANION GAP SERPL CALC-SCNC: 10 MMOL/L (ref 5–15)
APPEARANCE UR: CLEAR
AST SERPL W P-5'-P-CCNC: 23 U/L (ref 15–37)
BACTERIA URNS QL MICRO: NEGATIVE /HPF
BASOPHILS # BLD: 0 K/UL (ref 0–0.1)
BASOPHILS NFR BLD: 0 % (ref 0–1)
BILIRUB SERPL-MCNC: 0.6 MG/DL (ref 0.2–1)
BILIRUB UR QL: NEGATIVE
BUN SERPL-MCNC: 17 MG/DL (ref 6–20)
BUN/CREAT SERPL: 16 (ref 12–20)
CA-I BLD-MCNC: 10.4 MG/DL (ref 8.5–10.1)
CHLORIDE SERPL-SCNC: 94 MMOL/L (ref 97–108)
CO2 SERPL-SCNC: 23 MMOL/L (ref 21–32)
COLOR UR: ABNORMAL
CREAT SERPL-MCNC: 1.04 MG/DL (ref 0.7–1.3)
DIFFERENTIAL METHOD BLD: NORMAL
EOSINOPHIL # BLD: 0.2 K/UL (ref 0–0.4)
EOSINOPHIL NFR BLD: 2 % (ref 0–7)
ERYTHROCYTE [DISTWIDTH] IN BLOOD BY AUTOMATED COUNT: 12.3 % (ref 11.5–14.5)
GLOBULIN SER CALC-MCNC: 4.7 G/DL (ref 2–4)
GLUCOSE BLD STRIP.AUTO-MCNC: 297 MG/DL (ref 65–117)
GLUCOSE BLD STRIP.AUTO-MCNC: 473 MG/DL (ref 65–117)
GLUCOSE SERPL-MCNC: 456 MG/DL (ref 65–100)
GLUCOSE UR STRIP.AUTO-MCNC: >300 MG/DL
HCT VFR BLD AUTO: 44.2 % (ref 36.6–50.3)
HGB BLD-MCNC: 15.5 G/DL (ref 12.1–17)
HGB UR QL STRIP: NEGATIVE
IMM GRANULOCYTES # BLD AUTO: 0 K/UL (ref 0–0.04)
IMM GRANULOCYTES NFR BLD AUTO: 0 % (ref 0–0.5)
KETONES UR QL STRIP.AUTO: 5 MG/DL
LEUKOCYTE ESTERASE UR QL STRIP.AUTO: NEGATIVE
LYMPHOCYTES # BLD: 2.3 K/UL (ref 0.8–3.5)
LYMPHOCYTES NFR BLD: 27 % (ref 12–49)
MCH RBC QN AUTO: 29.3 PG (ref 26–34)
MCHC RBC AUTO-ENTMCNC: 35.1 G/DL (ref 30–36.5)
MCV RBC AUTO: 83.6 FL (ref 80–99)
MONOCYTES # BLD: 0.4 K/UL (ref 0–1)
MONOCYTES NFR BLD: 5 % (ref 5–13)
NEUTS SEG # BLD: 5.5 K/UL (ref 1.8–8)
NEUTS SEG NFR BLD: 66 % (ref 32–75)
NITRITE UR QL STRIP.AUTO: NEGATIVE
NRBC # BLD: 0 K/UL (ref 0–0.01)
NRBC BLD-RTO: 0 PER 100 WBC
PERFORMED BY, TECHID: ABNORMAL
PERFORMED BY, TECHID: ABNORMAL
PH UR STRIP: 5 [PH] (ref 5–8)
PLATELET # BLD AUTO: 341 K/UL (ref 150–400)
PMV BLD AUTO: 10.4 FL (ref 8.9–12.9)
POTASSIUM SERPL-SCNC: 4.1 MMOL/L (ref 3.5–5.1)
PROT SERPL-MCNC: 8.7 G/DL (ref 6.4–8.2)
PROT UR STRIP-MCNC: NEGATIVE MG/DL
RBC # BLD AUTO: 5.29 M/UL (ref 4.1–5.7)
RBC #/AREA URNS HPF: ABNORMAL /HPF (ref 0–5)
SODIUM SERPL-SCNC: 127 MMOL/L (ref 136–145)
SP GR UR REFRACTOMETRY: 1.03 (ref 1–1.03)
UA: UC IF INDICATED,UAUC: ABNORMAL
UROBILINOGEN UR QL STRIP.AUTO: 0.1 EU/DL (ref 0.1–1)
WBC # BLD AUTO: 8.4 K/UL (ref 4.1–11.1)
WBC URNS QL MICRO: ABNORMAL /HPF (ref 0–4)

## 2021-06-22 PROCEDURE — 87086 URINE CULTURE/COLONY COUNT: CPT

## 2021-06-22 PROCEDURE — 36415 COLL VENOUS BLD VENIPUNCTURE: CPT

## 2021-06-22 PROCEDURE — 74011250636 HC RX REV CODE- 250/636: Performed by: NURSE PRACTITIONER

## 2021-06-22 PROCEDURE — 73030 X-RAY EXAM OF SHOULDER: CPT

## 2021-06-22 PROCEDURE — 85025 COMPLETE CBC W/AUTO DIFF WBC: CPT

## 2021-06-22 PROCEDURE — 82962 GLUCOSE BLOOD TEST: CPT

## 2021-06-22 PROCEDURE — 80053 COMPREHEN METABOLIC PANEL: CPT

## 2021-06-22 PROCEDURE — 99284 EMERGENCY DEPT VISIT MOD MDM: CPT

## 2021-06-22 PROCEDURE — 81001 URINALYSIS AUTO W/SCOPE: CPT

## 2021-06-22 RX ADMIN — SODIUM CHLORIDE 1000 ML: 9 INJECTION, SOLUTION INTRAVENOUS at 12:50

## 2021-06-22 RX ADMIN — SODIUM CHLORIDE 1000 ML: 9 INJECTION, SOLUTION INTRAVENOUS at 11:40

## 2021-06-22 NOTE — ED PROVIDER NOTES
HPI   Chief Complaint   Patient presents with    High Blood Sugar     80-year-old male with history of diabetes, hypertension, hyperlipidemia presents with high blood sugar. In the past few days he ran out of his insulin injection needles and has not been giving himself insulin. He got his needles today, found out his sugar was in the 400s, did not come down despite giving him his home insulin. He denies any infectious symptoms including fevers chills cough nausea vomiting diarrhea dysuria. Patient does report several days of polyuria polydipsia.  1 day of mild dull holoacranial headache. Patient reports 1 week ago he had right shoulder rotator cuff repair, he has been recovering appropriately until yesterday when she tripped on hyper level fall onto the right shoulder, now it is moderately constantly painful with sharp pain. Past Medical History:   Diagnosis Date    Diabetes ([address]     Hypercholesterolemia     Hypertension        Past Surgical History:   Procedure Laterality Date    HX SHOULDER ARTHROSCOPY Right 06/09/2021    [place] CARDIAC SURG PROCEDURE UNLIST      Atrial fibrilation          Family History:   Problem Relation Age of Onset    Cancer Father        Social History     Socioeconomic History    Marital status:      Spouse name: Not on file    Number of children: Not on file    Years of education: Not on file    Highest education level: Not on file   Occupational History    Not on file   Tobacco Use    Smoking status: Former Smoker     Packs/day: 0.25     Years: 3.00     Pack years: 0.75    Smokeless tobacco: Never Used   Vaping Use    Vaping Use: Never used   Substance and Sexual Activity    Alcohol use:  Yes     Alcohol/week: 6.0 standard drinks     Types: 6 Cans of beer per week    Drug use: Not Currently     Types: Marijuana, Cocaine    Sexual activity: Not Currently   Other Topics Concern    Not on file   Social History Narrative    Not on file     Social Determinants of Health     Financial Resource Strain:     Difficulty of Paying Living Expenses:    Food Insecurity:     Worried About Running Out of Food in the Last Year:     [address] in the Last Year:    Transportation Needs:     Lack of Transportation (Medical):  Lack of Transportation (Non-Medical):    Physical Activity:     Days of Exercise per Week:     Minutes of Exercise per Session:    Stress:     Feeling of Stress :    Social Connections:     Frequency of Communication with Friends and Family:     Frequency of Social Gatherings with Friends and Family:     Attends [employer] Services:     Active Member of Clubs or Organizations:     Attends Club or Organization Meetings:     Marital Status:    Intimate Partner Violence:     Fear of Current or Ex-Partner:     Emotionally Abused:     Physically Abused:     Sexually Abused: ALLERGIES: Patient has no known allergies. Review of Systems   Endocrine: Positive for polydipsia and polyuria. Musculoskeletal:        Right shoulder pain   Neurological: Positive for headaches. All other systems reviewed and are negative. Vitals:    06/22/21 1126   BP: 114/77   Pulse: (!) 104   Resp: 18   Temp: 98 °F (36.7 °C)   SpO2: 97%   Weight: 116.1 kg (256 lb)   Height: 6' 2\" (1.88 m)            Physical Exam   Patient Vitals for the past 8 hrs:   Temp Pulse Resp BP SpO2   06/22/21 1508 -- 95 20 (!) 149/95 97 %   06/22/21 1252 -- 99 19 (!) 137/102 96 %   06/22/21 1126 98 °F (36.7 °C) (!) 104 18 114/77 97 %        Nursing note and vitals reviewed. Constitutional: NAD. Head: Normocephalic and atraumatic. Mouth/Throat: Airway patent. Dry mucous membranes. Eyes: EOMI. No scleral icterus. Neck: Neck supple. Cardiovascular: Normal rate, regular rhythm. Normal heart sounds. No murmur, rub, or gallop. Good pulses throughout. Pulmonary/Chest: No respiratory distress. Clear to auscultation bilaterally.   No wheezes, rales, or rhonchi. Abdominal/GI: BS normal, Soft, non-tender, non-distended. No rebound or guarding. Musculoskeletal: No gross injuries or deformities. Right shoulder mildly tender, incisions clean dry intact, no erythema, induration, drainage. Right bicep has healing ecchymosis. Right shoulder range of motion intact. Neurological: Alert and oriented to person, place, and time. Cranial Nerves 2-12 intact. Moving all extremities. No gross deficits. Psych: Pleasant, cooperative. Skin: Skin is warm and dry. No rash noted. MDM   Ddx = hyperglycemia due to not taking his insulin for several days, renal dysfunction, low suspicion for DKA, UTI. There is also concern for right shoulder fracture as he fell in the yesterday. Given 2 L of IV fluid bolus, on reassessment his glucose decreased from 473 to 297. Chemistry does not show any acidosis. UA is not infected. Shoulder x-ray unremarkable. Patient is discharged to follow-up with his PCP and his orthopedic surgeon, given return precautions.   Labs Reviewed   METABOLIC PANEL, COMPREHENSIVE - Abnormal; Notable for the following components:       Result Value    Sodium 127 (*)     Chloride 94 (*)     Glucose 456 (*)     Calcium 10.4 (*)     Protein, total 8.7 (*)     Globulin 4.7 (*)     A-G Ratio 0.9 (*)     All other components within normal limits   URINALYSIS W/ REFLEX CULTURE - Abnormal; Notable for the following components:    Glucose >300 (*)     Ketone 5 (*)     UA:UC IF INDICATED Urine Culture Ordered (*)     All other components within normal limits   GLUCOSE, POC - Abnormal; Notable for the following components:    Glucose (POC) 473 (*)     All other components within normal limits   GLUCOSE, POC - Abnormal; Notable for the following components:    Glucose (POC) 297 (*)     All other components within normal limits   CULTURE, URINE   CBC WITH AUTOMATED DIFF     XR SHOULDER RT AP/LAT MIN 2 V   Final Result        Medications   sodium chloride 0.9 % bolus infusion 1,000 mL (1,000 mL IntraVENous New Bag 6/22/21 1140)   sodium chloride 0.9 % bolus infusion 1,000 mL (1,000 mL IntraVENous New Bag 6/22/21 1250)     Ronaldo LOCKWOOD MD, am  the first and primary ED provider for this patient.           Procedures

## 2021-06-22 NOTE — ED TRIAGE NOTES
Pt has headache, took blood sugar this am and found it to be >580. Reports nausea, denies vomiting. Reports frequent urination and blurred vision.

## 2021-06-23 LAB
BACTERIA SPEC CULT: NORMAL
SPECIAL REQUESTS,SREQ: NORMAL

## 2021-07-12 ENCOUNTER — TELEPHONE (OUTPATIENT)
Dept: UROLOGY | Age: 61
End: 2021-07-12

## 2021-07-14 LAB
ALBUMIN SERPL-MCNC: 4.7 G/DL (ref 3.8–4.9)
ALBUMIN SERPL-MCNC: 4.8 G/DL (ref 3.8–4.9)
ALBUMIN/CREAT UR: 95 MG/G CREAT (ref 0–29)
ALP SERPL-CCNC: 101 IU/L (ref 48–121)
ALP SERPL-CCNC: 99 IU/L (ref 48–121)
ALT SERPL-CCNC: 38 IU/L (ref 0–44)
ALT SERPL-CCNC: 38 IU/L (ref 0–44)
AST SERPL-CCNC: 28 IU/L (ref 0–40)
AST SERPL-CCNC: 29 IU/L (ref 0–40)
BILIRUB DIRECT SERPL-MCNC: 0.1 MG/DL (ref 0–0.4)
BILIRUB DIRECT SERPL-MCNC: 0.1 MG/DL (ref 0–0.4)
BILIRUB SERPL-MCNC: 0.2 MG/DL (ref 0–1.2)
BILIRUB SERPL-MCNC: 0.2 MG/DL (ref 0–1.2)
CHOLEST SERPL-MCNC: 212 MG/DL (ref 100–199)
CREAT UR-MCNC: 42.4 MG/DL
HBA1C MFR BLD: 11.7 % (ref 4.8–5.6)
HDLC SERPL-MCNC: 68 MG/DL
LDLC SERPL CALC-MCNC: 95 MG/DL (ref 0–99)
MICROALBUMIN UR-MCNC: 40.2 UG/ML
PROT SERPL-MCNC: 7.1 G/DL (ref 6–8.5)
PROT SERPL-MCNC: 7.4 G/DL (ref 6–8.5)
PSA SERPL-MCNC: 0.3 NG/ML (ref 0–4)
TRIGL SERPL-MCNC: 296 MG/DL (ref 0–149)
TSH SERPL DL<=0.005 MIU/L-ACNC: 0.89 UIU/ML (ref 0.45–4.5)
VLDLC SERPL CALC-MCNC: 49 MG/DL (ref 5–40)

## 2021-07-18 LAB
TESTOST FREE SERPL-MCNC: 6.9 PG/ML (ref 6.6–18.1)
TESTOST SERPL-MCNC: 106 NG/DL (ref 264–916)

## 2021-08-24 ENCOUNTER — TELEPHONE (OUTPATIENT)
Dept: UROLOGY | Age: 61
End: 2021-08-24

## 2021-08-24 NOTE — TELEPHONE ENCOUNTER
Pt wanted to reschedule his missed appt that he had on 8/10 to follow up from labs.  I didn't know if he needed a soon appt or next available appt which is dec/jan  that's why I sent it to you all

## 2021-08-30 ENCOUNTER — TELEPHONE (OUTPATIENT)
Dept: UROLOGY | Age: 61
End: 2021-08-30

## 2021-08-30 NOTE — TELEPHONE ENCOUNTER
Entered into Cover my meds but it says PT not found. Can you get ins information to help this process along?

## 2021-08-30 NOTE — TELEPHONE ENCOUNTER
He says he has [employer]  as primary and [facility] for secondary.

## 2021-08-30 NOTE — TELEPHONE ENCOUNTER
patient needs prior auth for his testosterone injection. patient was notified that he should call pharmacy informing them they need to fax prior auth to us. lonny number given to patient.

## 2021-09-10 DIAGNOSIS — R79.89 LOW TESTOSTERONE IN MALE: ICD-10-CM

## 2021-09-21 PROBLEM — Z12.5 PROSTATE CANCER SCREENING: Status: ACTIVE | Noted: 2021-09-21

## 2021-09-23 NOTE — TELEPHONE ENCOUNTER
Pt called in regards to test, inj. Says he needs that sent to the pharmacy. Was reading previous notes, that they cannot find pt?  pls advise.   Thank you

## 2021-10-04 PROBLEM — R35.0 URINARY FREQUENCY: Status: ACTIVE | Noted: 2021-10-04

## 2021-10-05 DIAGNOSIS — R79.89 LOW TESTOSTERONE IN MALE: ICD-10-CM

## 2021-10-05 DIAGNOSIS — N40.0 BENIGN PROSTATIC HYPERPLASIA, UNSPECIFIED WHETHER LOWER URINARY TRACT SYMPTOMS PRESENT: Primary | ICD-10-CM

## 2021-10-06 ENCOUNTER — OFFICE VISIT (OUTPATIENT)
Dept: UROLOGY | Age: 61
End: 2021-10-06
Payer: COMMERCIAL

## 2021-10-06 ENCOUNTER — TELEPHONE (OUTPATIENT)
Dept: PODIATRY | Age: 61
End: 2021-10-06

## 2021-10-06 VITALS — BODY MASS INDEX: 33.37 KG/M2 | HEIGHT: 74 IN | WEIGHT: 260 LBS | TEMPERATURE: 97.3 F

## 2021-10-06 DIAGNOSIS — Z12.5 PROSTATE CANCER SCREENING: ICD-10-CM

## 2021-10-06 DIAGNOSIS — N52.8 OTHER MALE ERECTILE DYSFUNCTION: ICD-10-CM

## 2021-10-06 DIAGNOSIS — E11.42 TYPE 2 DIABETES MELLITUS WITH DIABETIC POLYNEUROPATHY, WITHOUT LONG-TERM CURRENT USE OF INSULIN (HCC): ICD-10-CM

## 2021-10-06 DIAGNOSIS — R35.0 URINARY FREQUENCY: ICD-10-CM

## 2021-10-06 DIAGNOSIS — R79.89 LOW TESTOSTERONE IN MALE: Primary | ICD-10-CM

## 2021-10-06 DIAGNOSIS — N40.0 BENIGN PROSTATIC HYPERPLASIA, UNSPECIFIED WHETHER LOWER URINARY TRACT SYMPTOMS PRESENT: ICD-10-CM

## 2021-10-06 PROCEDURE — 99214 OFFICE O/P EST MOD 30 MIN: CPT | Performed by: UROLOGY

## 2021-10-06 RX ORDER — TESTOSTERONE CYPIONATE 200 MG/ML
200 INJECTION INTRAMUSCULAR EVERY 2 WEEKS
Qty: 10 ML | Refills: 3 | Status: SHIPPED | OUTPATIENT
Start: 2021-10-06 | End: 2021-12-17

## 2021-10-06 NOTE — PROGRESS NOTES
Chief Complaint   Patient presents with    Follow-up    Benign Prostatic Hypertrophy    Erectile Dysfunction     1. Have you been to the ER, urgent care clinic since your last visit? Hospitalized since your last visit? Yes Where: [facility]    2. Have you seen or consulted any other health care providers outside of the [address] [place] [address] since your last visit? Include any pap smears or colon screening.  No  Visit Vitals  Temp 97.3 °F (36.3 °C) (Temporal)   Ht 6' 2\" (1.88 m)   Wt 260 lb (117.9 kg)   BMI 33.38 kg/m²

## 2021-10-06 NOTE — ASSESSMENT & PLAN NOTE
He has been off testosterone therapy since June when he ran out. He has been getting testosterone in the office. He wishes to resume. Testosterone prescribed. Resume every 2 week dosing 200mg IM. Labs done at [facility] yesterday, still pending.

## 2021-10-06 NOTE — PROGRESS NOTES
HISTORY OF PRESENT ILLNESS  Esme Curiel is a 64 y.o. male. Chief Complaint   Patient presents with    Follow-up    Benign Prostatic Hypertrophy    Erectile Dysfunction     He is a diabetic. He feels his flow is ok. He feels he is emptying. He has a little post void dribbling. He feels a discharge while voiding but cannot describe more. He was on testosterone therapy and did not follow up. He is now here to restart treatment. Chronic Conditions Addressed Today     1. Type 2 diabetes mellitus with diabetic polyneuropathy, without long-term current use of insulin ([facility])     Overview      Hemoglobin A1c on 7/13/21 was 11.7         2. Other male erectile dysfunction     Overview      11/11/2020: Starting summer 2020. He has less confidence in his erections. He can achieve 30-40% tumescence barely enough for penetration. He cannot maintain it. His interest is normal.  It is affecting his relationships. He has bothersome ED. Contributing factors are diabetes and HTN. He was started on tadalafil 5mg daily. Erections went from 2/10 to 4-5/10.     1/14/21: He was started on TRT, testosterone cypionate 200 mg IM q 2 weeks. 3/23/21: Daily tadalafil 5 mg dosing. He has resumed TRT as well. He was not consistently injecting. Current Assessment & Plan       He has not followed up as scheduled. Tadalafil has been less effective off testosterone          3. Benign prostatic hyperplasia     Overview      11/11/2020: Moderate LUTS. Tadalafil may help some, 5 mg daily. Alexandro Parnell MD).  1/13/21: improved on tadalafil. Voiding with less frequency. Current Assessment & Plan      Stable, continue medication         4. Low testosterone in male - Primary     Overview      12/9/2020: serum testosterone was 210, though free testosterone was 8.9.  1/13/21: adequate free testosterone levels indicate that TRT may not help. However, he wanted to try.   Started on 200 mg IM testosterone cypionate q 2 weeks on 1/14/21. He was not consistently injecting. He wished to restart therapy on 3/23/21.    6/10/21: therapeutic labs in May.      7/13/21: hepatic panel WNL, PSA 0.3, serum T was 106 with free T at 6.9            Current Assessment & Plan       He has been off testosterone therapy since June when he ran out. He has been getting testosterone in the office. He wishes to resume. Testosterone prescribed. Resume every 2 week dosing 200mg IM. Labs done at [facility] yesterday, still pending. 5. Prostate cancer screening     Overview      PSA 12/9/2020 was 0.3  PSA 7/13/21 was 0.3          Current Assessment & Plan      No concerns at this time. 6. Urinary frequency     Overview      6/16/21: presents to the ED with cc of frequency in urination over the last couple of days. Patient reports he has been getting up to urinate 8-10 times a night. Associated symptoms include polydipsia. He is also complaining of constipation. Urine culture with no growth. Past Medical History:    PMHx (including negatives):  has a past medical history of Diabetes ([facility] [address] Hypercholesterolemia, and Hypertension. PSurgHx:  has a past surgical history that includes pr cardiac surg procedure unlist; hx shoulder arthroscopy (Right, 06/09/2021); and hx orthopaedic (Left). PSocHx:  reports that he has quit smoking. He has a 0.75 pack-year smoking history. He has never used smokeless tobacco. He reports current alcohol use of about 6.0 standard drinks of alcohol per week. He reports previous drug use. Drugs: Marijuana and Cocaine. ROS  Physical Exam  No Known Allergies   Prior to Admission medications    Medication Sig Start Date End Date Taking?  Authorizing Provider   doxycycline (VIBRAMYCIN) 100 mg capsule  6/9/21  Yes Provider, Historical   naproxen (NAPROSYN) 500 mg tablet  4/20/21  Yes Provider, Historical   lisinopriL (PRINIVIL, ZESTRIL) 5 mg tablet  3/11/21  Yes Provider, Historical   gabapentin (NEURONTIN) 300 mg capsule Take 1 Cap by mouth three (3) times daily. Max Daily Amount: 900 mg. 1/28/21  Yes Irma Nava DPM   omeprazole (PRILOSEC) 40 mg capsule Take 40 mg by mouth daily. Yes Provider, Historical   atorvastatin (LIPITOR) 20 mg tablet Take  by mouth daily. Yes Provider, Historical   empagliflozin (Jardiance) 10 mg tablet Take  by mouth daily. Yes Provider, Historical   metoprolol tartrate (LOPRESSOR) 100 mg IR tablet Take  by mouth two (2) times a day. Yes Provider, Historical   aspirin delayed-release 81 mg tablet Take  by mouth daily. Yes Provider, Historical   ibuprofen 100 mg tablet Take 100 mg by mouth every six (6) hours as needed for Pain. Yes Provider, Historical   tadalafiL (CIALIS) 5 mg tablet Take 1 Tab by mouth daily. 12/9/20  Yes Yash Zamudio MD   testosterone cypionate (DEPOTESTOTERONE CYPIONATE) 200 mg/mL injection 1 mL by IntraMUSCular route Once every 2 weeks. Max Daily Amount: 200 mg. 1/13/21   Yash Zamudio MD        ASSESSMENT and PLAN  Diagnoses and all orders for this visit:    1. Low testosterone in male  Assessment & Plan:   He has been off testosterone therapy since June when he ran out. He has been getting testosterone in the office. He wishes to resume. Testosterone prescribed. Resume every 2 week dosing 200mg IM. Labs done at [facility] yesterday, still pending. Orders:  -     testosterone cypionate (DEPOTESTOTERONE CYPIONATE) 200 mg/mL injection; 1 mL by IntraMUSCular route Once every 2 weeks. Max Daily Amount: 200 mg.    2. Urinary frequency    3. Other male erectile dysfunction  Assessment & Plan:   He has not followed up as scheduled. Tadalafil has been less effective off testosterone       4. Benign prostatic hyperplasia, unspecified whether lower urinary tract symptoms present  Assessment & Plan:  Stable, continue medication      5. Prostate cancer screening  Assessment & Plan:  No concerns at this time. 6. Type 2 diabetes mellitus with diabetic polyneuropathy, without long-term current use of insulin ([facility]         Ziggy Holman MD

## 2021-10-06 NOTE — LETTER
10/6/2021    Patient: Swetha Drew   YOB: 1960   Date of Visit: 10/6/2021     Char Zabala MD  [address]  [address] [facility] [address]  Via Fax: [phone]    Dear Char Zabala MD,      Thank you for referring Mr. Norah Clark to  [facility] for evaluation. My notes for this consultation are attached. If you have questions, please do not hesitate to call me. I look forward to following your patient along with you.       Sincerely,    Radha Snider MD

## 2021-10-10 LAB
PSA SERPL-MCNC: 0.3 NG/ML (ref 0–4)
TESTOST FREE SERPL-MCNC: 8.8 PG/ML (ref 6.6–18.1)

## 2021-10-12 ENCOUNTER — OFFICE VISIT (OUTPATIENT)
Dept: PODIATRY | Age: 61
End: 2021-10-12
Payer: MEDICAID

## 2021-10-12 VITALS
BODY MASS INDEX: 33.98 KG/M2 | HEART RATE: 99 BPM | HEIGHT: 74 IN | WEIGHT: 264.8 LBS | SYSTOLIC BLOOD PRESSURE: 129 MMHG | TEMPERATURE: 96.6 F | DIASTOLIC BLOOD PRESSURE: 82 MMHG

## 2021-10-12 DIAGNOSIS — M79.605 BILATERAL LOWER EXTREMITY PAIN: ICD-10-CM

## 2021-10-12 DIAGNOSIS — E11.42 DIABETIC POLYNEUROPATHY ASSOCIATED WITH TYPE 2 DIABETES MELLITUS (HCC): Primary | ICD-10-CM

## 2021-10-12 DIAGNOSIS — M79.604 BILATERAL LOWER EXTREMITY PAIN: ICD-10-CM

## 2021-10-12 PROCEDURE — 99213 OFFICE O/P EST LOW 20 MIN: CPT | Performed by: PODIATRIST

## 2021-10-12 RX ORDER — PREGABALIN 75 MG/1
75 CAPSULE ORAL 3 TIMES DAILY
Qty: 90 CAPSULE | Refills: 2 | Status: SHIPPED | OUTPATIENT
Start: 2021-10-12 | End: 2022-05-17

## 2021-10-12 NOTE — PROGRESS NOTES
[facility]    Subjective:         Patient is a 64 y.o. male who is being seen as a returning pt for B/L neuropathy and pain. Pt states he the gabapentin has not helped. Would like to discuss additional tx options. States the pain rises to the level of 7/10. Is worse in the evenings. Denies any local/systemic signs of infx. Denies any recent trauma. Denies any other pedal complaints. Past Medical History:   Diagnosis Date    Diabetes ([address]     Hypercholesterolemia     Hypertension      Past Surgical History:   Procedure Laterality Date    HX ORTHOPAEDIC Left     wrist     HX SHOULDER ARTHROSCOPY Right 06/09/2021    LA CARDIAC SURG PROCEDURE UNLIST      Atrial fibrilation        Family History   Problem Relation Age of Onset    Cancer Father       Social History     Tobacco Use    Smoking status: Former Smoker     Packs/day: 0.25     Years: 3.00     Pack years: 0.75    Smokeless tobacco: Never Used   Substance Use Topics    Alcohol use: Yes     Alcohol/week: 6.0 standard drinks     Types: 6 Cans of beer per week     No Known Allergies  Prior to Admission medications    Medication Sig Start Date End Date Taking? Authorizing Provider   pregabalin (LYRICA) 75 mg capsule Take 1 Capsule by mouth three (3) times daily. Max Daily Amount: 225 mg. 10/12/21  Yes Kim Celaya DPM   testosterone cypionate (DEPOTESTOTERONE CYPIONATE) 200 mg/mL injection 1 mL by IntraMUSCular route Once every 2 weeks. Max Daily Amount: 200 mg. 10/6/21  Yes Azam Alanis MD   doxycycline (VIBRAMYCIN) 100 mg capsule  6/9/21  Yes Provider, Historical   naproxen (NAPROSYN) 500 mg tablet  4/20/21  Yes Provider, Historical   lisinopriL (PRINIVIL, ZESTRIL) 5 mg tablet  3/11/21  Yes Provider, Historical   omeprazole (PRILOSEC) 40 mg capsule Take 40 mg by mouth daily. Yes Provider, Historical   atorvastatin (LIPITOR) 20 mg tablet Take  by mouth daily.    Yes Provider, Historical   empagliflozin (Jardiance) 10 mg tablet Take  by mouth daily. Yes Provider, Historical   metoprolol tartrate (LOPRESSOR) 100 mg IR tablet Take  by mouth two (2) times a day. Yes Provider, Historical   aspirin delayed-release 81 mg tablet Take  by mouth daily. Yes Provider, Historical   ibuprofen 100 mg tablet Take 100 mg by mouth every six (6) hours as needed for Pain. Yes Provider, Historical   tadalafiL (CIALIS) 5 mg tablet Take 1 Tab by mouth daily. 12/9/20  Yes Homer Newberry MD       Review of Systems   Constitutional: Negative. HENT: Negative. Eyes: Negative. Respiratory: Negative. Cardiovascular: Negative. Gastrointestinal: Negative. Endocrine: Negative. Genitourinary: Negative. Musculoskeletal: Negative. Allergic/Immunologic: Positive for immunocompromised state. Neurological: Positive for numbness. Hematological: Negative. Psychiatric/Behavioral: Negative. All other systems reviewed and are negative. Objective:     Visit Vitals  /82 (BP 1 Location: Left upper arm, BP Patient Position: Sitting, BP Cuff Size: Large adult)   Pulse 99   Temp (!) 96.6 °F (35.9 °C) (Temporal)   Ht 6' 2\" (1.88 m)   Wt 264 lb 12.8 oz (120.1 kg)   BMI 34.00 kg/m²       Physical Exam  Vitals reviewed. Constitutional:       Appearance: He is obese. Cardiovascular:      Pulses:           Dorsalis pedis pulses are 2+ on the right side and 2+ on the left side. Posterior tibial pulses are 2+ on the right side and 2+ on the left side. Pulmonary:      Effort: Pulmonary effort is normal.   Musculoskeletal:      Right lower leg: No edema. Left lower leg: No edema. Right foot: Normal range of motion. No deformity or bunion. Left foot: Normal range of motion. No deformity or bunion. Feet:      Right foot:      Protective Sensation: 10 sites tested. 0 sites sensed.       Skin integrity: Skin integrity normal.      Toenail Condition: Right toenails are normal.      Left foot:      Protective Sensation: 10 sites tested. 0 sites sensed. Skin integrity: Callus and dry skin present. Toenail Condition: Left toenails are ingrown. Lymphadenopathy:      Lower Body: No right inguinal adenopathy. No left inguinal adenopathy. Skin:     General: Skin is warm. Capillary Refill: Capillary refill takes 2 to 3 seconds. Neurological:      Mental Status: He is alert and oriented to person, place, and time. Psychiatric:         Mood and Affect: Mood and affect normal.         Behavior: Behavior is cooperative. Data Review: No results found for this or any previous visit (from the past 24 hour(s)). Impression:       ICD-10-CM ICD-9-CM    1. Diabetic polyneuropathy associated with type 2 diabetes mellitus (HCC)  E11.42 250.60 pregabalin (LYRICA) 75 mg capsule     357.2    2. Bilateral lower extremity pain  M79.604 729.5 LOWER EXT ART PVR MULT LEVEL SEG PRESSURES    M79.605         Recommendation:     Patient seen and evaluated in the office  Discussed and educated patient regarding their current medical condition. Instructed patient to monitor their feet daily, be compliant with all medications and wear supportive shoe gear. In depth convo had regardign medication management of his peripheral neuropathy, given rx for lyrica 75mg to be taken TID PO  Discussed his LE edema, order given for non-invasive vascular studies        Grupreet Magana, [facility], [address] [place] [address] and [facility]  [address] [place] [address]  O: [phone]  F: [phone]  C: [phone]

## 2021-10-12 NOTE — PROGRESS NOTES
1. Have you been to the ER, urgent care clinic since your last visit? Hospitalized since your last visit? No    2. Have you seen or consulted any other health care providers outside of the [address] [place] [address] since your last visit? Include any pap smears or colon screening.  No     Chief Complaint   Patient presents with    Follow-up     6 month f/u neuropathy

## 2021-10-20 ENCOUNTER — OFFICE VISIT (OUTPATIENT)
Dept: UROLOGY | Age: 61
End: 2021-10-20
Payer: MEDICAID

## 2021-10-20 DIAGNOSIS — R79.89 LOW TESTOSTERONE IN MALE: Primary | ICD-10-CM

## 2021-10-20 PROCEDURE — 96372 THER/PROPH/DIAG INJ SC/IM: CPT | Performed by: NURSE PRACTITIONER

## 2021-10-20 RX ORDER — TESTOSTERONE CYPIONATE 200 MG/ML
200 INJECTION INTRAMUSCULAR
Status: COMPLETED | OUTPATIENT
Start: 2021-10-20 | End: 2021-10-20

## 2021-10-20 RX ADMIN — TESTOSTERONE CYPIONATE 200 MG: 200 INJECTION INTRAMUSCULAR at 19:04

## 2021-10-20 NOTE — PROGRESS NOTES
HISTORY OF PRESENT ILLNESS  Winston Rueda is a 64 y.o. male. Chief Complaint   Patient presents with    Injection     He is here today to resume testosterone replacement therapy. He received an injection of 200 mg of testosterone cypionate IM in the right buttock. Nurse visit only. Patient denies the symptoms of COVID-19 per routine screening guidelines. ROS    Past Medical History:  PMHx (including negatives):  has a past medical history of Diabetes ([address] Hypercholesterolemia, and Hypertension. PSurgHx:  has a past surgical history that includes pr cardiac surg procedure unlist; hx shoulder arthroscopy (Right, 06/09/2021); and hx orthopaedic (Left). PSocHx:  reports that he has quit smoking. He has a 0.75 pack-year smoking history. He has never used smokeless tobacco. He reports current alcohol use of about 6.0 standard drinks of alcohol per week. He reports previous drug use. Drugs: Marijuana and Cocaine.          Anna Madsen NP

## 2021-10-29 ENCOUNTER — HOSPITAL ENCOUNTER (OUTPATIENT)
Dept: NON INVASIVE DIAGNOSTICS | Age: 61
Discharge: HOME | End: 2021-10-29
Attending: PODIATRIST
Payer: COMMERCIAL

## 2021-10-29 DIAGNOSIS — M79.605 BILATERAL LOWER EXTREMITY PAIN: ICD-10-CM

## 2021-10-29 DIAGNOSIS — M79.604 BILATERAL LOWER EXTREMITY PAIN: ICD-10-CM

## 2021-10-29 PROCEDURE — 93923 UPR/LXTR ART STDY 3+ LVLS: CPT

## 2021-11-01 LAB
LEFT ABI: 0.97
LEFT ARM BP: 122 MMHG
LEFT CALF PRESSURE: 142 MMHG
LEFT HIGH THIGH PRESSURE: 173 MMHG
LEFT POSTERIOR TIBIAL: 121 MMHG
LEFT TBI: 0.95
LEFT TOE PRESSURE: 123 MMHG
RIGHT ABI: 1.1
RIGHT ARM BP: 130 MMHG
RIGHT CALF PRESSURE: 150 MMHG
RIGHT HIGH THIGH PRESSURE: 205 MMHG
RIGHT POSTERIOR TIBIAL: 143 MMHG
RIGHT TBI: 0.83
RIGHT TOE PRESSURE: 108 MMHG
VAS LEFT DORSALIS PEDIS BP: 126 MMHG
VAS RIGHT DORSALIS PEDIS BP: 121 MMHG

## 2021-11-01 PROCEDURE — 93923 UPR/LXTR ART STDY 3+ LVLS: CPT | Performed by: SURGERY

## 2021-11-16 ENCOUNTER — OFFICE VISIT (OUTPATIENT)
Dept: UROLOGY | Age: 61
End: 2021-11-16
Payer: COMMERCIAL

## 2021-11-16 DIAGNOSIS — R79.89 LOW TESTOSTERONE IN MALE: Primary | ICD-10-CM

## 2021-11-16 PROCEDURE — 96372 THER/PROPH/DIAG INJ SC/IM: CPT | Performed by: NURSE PRACTITIONER

## 2021-11-16 RX ORDER — TESTOSTERONE CYPIONATE 200 MG/ML
200 INJECTION INTRAMUSCULAR
Status: COMPLETED | OUTPATIENT
Start: 2021-11-16 | End: 2021-11-16

## 2021-11-16 RX ADMIN — TESTOSTERONE CYPIONATE 200 MG: 200 INJECTION INTRAMUSCULAR at 11:07

## 2021-12-17 ENCOUNTER — OFFICE VISIT (OUTPATIENT)
Dept: UROLOGY | Age: 61
End: 2021-12-17
Payer: COMMERCIAL

## 2021-12-17 DIAGNOSIS — R79.89 LOW TESTOSTERONE IN MALE: Primary | ICD-10-CM

## 2021-12-17 PROCEDURE — 96372 THER/PROPH/DIAG INJ SC/IM: CPT | Performed by: NURSE PRACTITIONER

## 2021-12-17 RX ORDER — TESTOSTERONE CYPIONATE 200 MG/ML
200 INJECTION INTRAMUSCULAR EVERY 2 WEEKS
Qty: 10 ML | Refills: 3 | Status: SHIPPED | OUTPATIENT
Start: 2021-12-17 | End: 2022-05-17

## 2021-12-17 RX ORDER — TESTOSTERONE CYPIONATE 200 MG/ML
200 INJECTION INTRAMUSCULAR
Status: COMPLETED | OUTPATIENT
Start: 2021-12-17 | End: 2021-12-17

## 2021-12-17 RX ADMIN — TESTOSTERONE CYPIONATE 200 MG: 200 INJECTION INTRAMUSCULAR at 13:37

## 2021-12-30 ENCOUNTER — HOSPITAL ENCOUNTER (OUTPATIENT)
Dept: PHYSICAL THERAPY | Age: 61
Discharge: HOME | End: 2021-12-30
Payer: COMMERCIAL

## 2021-12-30 PROCEDURE — 97162 PT EVAL MOD COMPLEX 30 MIN: CPT

## 2021-12-30 NOTE — PROGRESS NOTES
[address]  Ph: [phone]    Fax: [phone]    Initial Evaluation/Plan of Care/Statement of Necessity for Physical Therapy Services     Patient name: Magaly Barnhart    Date/Start of Care:2021  : 1960  [x]  Patient  Verified  Provider#: [id number]          Referral source: Jamie Tavera    Return visit to MD: after PT      Medical/Treatment Diagnosis: Other dorsalgia [M54.89]    Payor: BLUE CROSS / Plan: [address] [place] [address] / Product Type: PPO /       Prior Hospitalization: see medical history     Comorbidities: see chart   Prior Level of Function: independent   Medications: Verified on Patient Summary List          Patient / Family readiness to learn indicated by: asking questions, trying to perform skills and interest  Persons(s) to be included in education: patient (P)  Barriers to Learning/Limitations: None  Patient Self Reported Health Status: fair  Rehabilitation Potential: fair  Previous Treatment/Compliance: None  PMHx/Surgical Hx: DM (neropathy, OA, Alcohol use, high BP and "[employer]  Work Hx: Not working at this time due to RCT surgery  Living Situation: Lives with family in a house with no stairs to enter or inside. Barriers to progress: None  Motivation: good  Substance use: N/A  Cognition: A & O x 4  Onset Date: 6-7 months    SUBJECTIVE  Patient was referred to PT due to middle and lower back pain that started about 6-7 months ago. He has been out of work due to RCT and wrist surgery and he hasn't been doing much and then he started to notice having more back pain that started to get more pronounced when he was not moving as much. He c/o numbness/tingling going down both legs up to the knees and he also c/o having muscles spasm on the right flank that he needs to stretch out to feel better. He does not recall any particular injury and no recent imaging on the back were done.  MD gave him some muscle relaxer and pain meds and they helped but he hasn't got any more refills. The pain is intermitted comes and goes from high to low pending on the day or time. Patient reports the pain soemtimes wakes him up  at night. He c/o sharp and achyness pain and sometimes it is dull and tight. Activities that produce pain: standing constantly >10 min, sitting > 15 min and walking. Activities that make it better: sitting, laying down but he needs to readjust, putting a pillow under his abdomen when he sleeps on his stomach, heating pad with medication. Functional limitations: walking in the grocery store, playing with my grandchildren, lifting his grandchildren, and going to the gym. He also c/o balance issues due to neuropathies in his feet. Goal: \" To improve my mobility and reduce my back problem and pain free\".    Mechanism of Injury: unknow  Area of pain: mild/lower back  Pain Descriptors:  Sharp and achyness   Pain Level (0-10 scale)  At rest: 6/10  With activity: 7/10   Worst: 8-9/10   Least: 5/10    Objective/Functional Measures including ROM/MMT:   Physical Findings   Ortho:   Posture: Wide CLAUDIA,   Gait and Functional Mobility:  Slow gait with WBOS and decreased bilateral heel to toe gait pattern  Palpation: TTP at B gluts and B PSIS, R flank tight musculature and decreased lordosis   Gross findings:  Well build Tight B hamstring and heel cord   Swelling: none   Spine:   TRUNK ROM:     Flexion:                         [] N/A  []WNL  []Minimal  [x]Moderate  [] Major 47cm from 3rd finger to floor  tightness and muscle turgor on R side diffculty coming up from flexion  Extension:                     [] N/A  []WNL  []Minimal  [x]Moderate  [] Major  P! At lower center of back   Right Lateral Flexion:    [] N/A  []WNL  []Minimal  []Moderate  [x] Major  P!  On right side 62cm from 3rd finger to floor   Left Lateral Flexion:   [] N/A  []WNL  []Minimal  [x]Moderate  [] Major pulling on R 58 cm from 3rd finger to floor   Rotation to the Right:  [] N/A  []WNL  []Minimal  [x]Moderate  [] Major p! On the Right   Rotation to the Left:   [] N/A  []WNL  [x]Minimal  []Moderate  [] Major pulling on the right side. Right Side Glide:           [] N/A  []WNL  []Minimal  []Moderate  [] Major  Left Side Glide:   [] N/A  []WNL  []Minimal  []Moderate  [] Major  Additional comments: limited with extension>flexion and repots pain with R SB/R Rotation on R side and pulling with L SB and Rotation. Specific joints: *normal values in ()    Hip      AROM       PROM             MMT   R L R L R L   Flexion (120)     4p! 4   Extension (15)     4p! 4p! Abduction (40)     4+ p! In back  4+   Adduction (30)     2- 2+   IR (40)         ER (40)         Additional comments: PROM pain with left hip flexion in lower back   (+) SLR   Able to initiate bridge but reports imidiate lower back pain. Pain with hip extnsion in back and hamstring   Hamstring flexibility test:   R 48 deg   L 55 deg     Knee          AROM          PROM                       MMT   R L R L R L   Extension (0)      5 5   Flexion (145)     4+ p! LBP  5 p! LBP   Additional comments:   Pain in hamstring with knee flexion   Quad flexability in prone  R 85deg co cramping in hamstring and knee pain. L 90 deg     Ankle                                 AROM                       PROM                             MMT   R L R L R L   Dorsiflexion (15)     5p! Sharp pain in the lower back  5   Plantarflexion (50)         Additional comments:   Mobility Assessment:     Gait and Functional Mobility:  Transfers:   Bed mobility: independent  Sit to/from Supine: independent   Sit to/from stands: requires UE support and c/o pain upon standing  Gait: slow gait pattern with WBOS and decreased bilateral heel to toe gait pattern. Assistive device:  None  Gait speed: NT  TUG test: 15 sec   Stairs: NT    Patient reports functional limitations with:standing, sitting and walking. Neurological: Reflexes / Sensations: intact to light touch in hip/knee and neropathies in the feet   Special Tests:      FABERS: (+) R in glut and lower back and (L) only in lower back    Slump: (+R) in glut and back area sharp    H.S. SLR: (+)R 40 deg     Piriformis Ext:(+R)      Sitting Balance: (unsupported)  Static:      [x] Normal [] Good [] Fair [] Poor  Dynamic: [] Normal [x] Good [] Fair [] Poor  Standing Balance:   Static:     [] Normal [] Good [x] Fair [] Poor pain after 2 min of standing   Dynamic: [] Normal [] Good [x] Fair [] Poor - pain with ambulation     Modified Clinical Test of Sensory Interaction (CTSIB-M):    Eyes open/firm surface: 2 min regular stance /  NBOS able to stand for 40 sec noted increased sway also c/o lower back    Eyes closed/firm surface: 30 sec with WBOS increased ant/post sway. Tandem Stand:    R foot forward: 25 EO   L foot forward: 9 sec EO    Single limb stand:   R: 7sec  L: 2sec     Functional Reach   Feet Placement: WBOS     Score: 7 inch   <7 inches indicates poor functional balance  COMMENTS (include gait belt, level of assistance):  Sv     Walk on heel - WNL  Walk on toes- reports sharp pain bilateral to knee area. Ampac Score:   77.89%  ASSESSMENT/Changes in Function:   Patient is a 63 y/o male presents to OP skilled PT services with dx of dorsalgia with radiculopathy. Patient presents with decreased lumbar ROM, (+) R SLR, decreased core and LE strength, decreased balance, flexibility and gait impairments. Patient will benefit from skilled PT services to address above.    Problem List/Impairments: pain affecting function, decrease ROM, decrease strength, impaired gait/ balance, decrease ADL/ functional abilitiies, decrease activity tolerance, decrease flexibility/ joint mobility and decrease transfer abilities  Treatment Plan may include any combination of the following: Therapeutic exercise, Neuromuscular re-education, Physical agent/modality, Gait/balance training, Manual therapy, Patient education and Functional mobility training  Patient/ Caregiver education and instruction: exercises  Frequency / Duration: Patient to be seen 2 times per week for 12-16 treatments. Certification Period: 12/30/21-3/30/22  Patient Goal (s): \" To improve my mobility and reduce my back problem and be pain free\".     [] Met [] Not met [] Partially met   Short Term Goals: To be accomplished in 2-4  treatments. 1. Patient will be independent with his HEP to progress with POC. [] Met [] Not met [] Partially met   2. Patient pain level will subside to < 4/10 with mobility on the VAS scale. [] Met [] Not met [] Partially met     Long Term Goals: To be accomplished in 12-16  treatments. 1. Patient will report decreased radicular pain and more centralization by 50% since start of therapy. [] Met [] Not met [] Partially met   2. Patient will gain 5-8 deg of hamstring flexibility to improve lumbar/pelvic mobility. [] Met [] Not met [] Partially met   3. Patient will be able to stand for 20-30 min while during his morning ADL's with less back pain and discomfort. [] Met [] Not met [] Partially met   5. Patient will be able to show good body mechanics technique when lifting items or his grandchildren with less back pain and discomfort. [] Met [] Not met [] Partially met   6. Patient will be able to ambulate for 20-30 min ( such as:doing his grocery shopping ) with less back pain and discomfort.  [] Met [] Not met [] Partially met   TODAY'S TREATMENT: EVALUATION completed   Visit #:1  In time: 2:20   Out time:3:20   Total Treatment Time (min): 60   Total Timed Codes (min): 0  1:1 Treatment Time ( only): 0   Pain Level (0-10 scale) pre treatment: 7/10  Pain Level (0-10 scale) post treatment: 7/10     With   [] TE   [] TA   [] Neuro   [] SC   [] other: Patient Education: [] Review HEP    [] Progressed/Changed HEP based on:   [] positioning   [] body mechanics   [] transfers   [] heat/ice application    [x] other: POC         [x]  Plan of care has been reviewed with PTA. The Plan of Care is based on information from the initial evaluation. Marion Liu, PT, DPT 12/30/2021   ________________________________________________________________________    I certify that the above Therapy Services are being furnished while the patient is under my care. I agree with the treatment plan and certify that this therapy is necessary.     [de-identified] Signature:_________________________________________________  Date:____________Time: ____________     [facility]

## 2022-01-03 ENCOUNTER — HOSPITAL ENCOUNTER (EMERGENCY)
Age: 62
Discharge: HOME | End: 2022-01-03
Attending: EMERGENCY MEDICINE
Payer: COMMERCIAL

## 2022-01-03 VITALS
WEIGHT: 254 LBS | OXYGEN SATURATION: 97 % | HEIGHT: 74 IN | BODY MASS INDEX: 32.6 KG/M2 | HEART RATE: 100 BPM | DIASTOLIC BLOOD PRESSURE: 84 MMHG | TEMPERATURE: 98.3 F | SYSTOLIC BLOOD PRESSURE: 144 MMHG | RESPIRATION RATE: 16 BRPM

## 2022-01-03 DIAGNOSIS — H61.21 HEARING LOSS SECONDARY TO CERUMEN IMPACTION, RIGHT: Primary | ICD-10-CM

## 2022-01-03 PROCEDURE — 99282 EMERGENCY DEPT VISIT SF MDM: CPT

## 2022-01-03 PROCEDURE — 76010010392 HC REMOVAL IMPACTED WAX IRRIGATION/LVG UNI

## 2022-01-06 NOTE — ED PROVIDER NOTES
EMERGENCY DEPARTMENT HISTORY AND PHYSICAL EXAM      Date: 1/3/2022  Patient Name: Asad Still    History of Presenting Illness     Chief Complaint   Patient presents with    Ear Pain       History Provided By: Patient    HPI: Asad Still, 64 y.o. male with a past medical history significant diabetes, hypertension, hyperlipidemia and CAD presents to the ED with cc of right ear pain and fullness which has been progressively getting worse over the course of the last couple of weeks. Patient states that he was using eardrops without relief of his symptoms. He states that he feels like buildup of wax is causing his symptoms. He reports no dizziness, tinnitus, headaches, vision changes. There are no other complaints, changes, or physical findings at this time. PCP: Jaziel Huber MD    No current facility-administered medications on file prior to encounter. Current Outpatient Medications on File Prior to Encounter   Medication Sig Dispense Refill    testosterone cypionate (DEPOTESTOTERONE CYPIONATE) 200 mg/mL injection 1 mL by IntraMUSCular route Once every 2 weeks. Max Daily Amount: 200 mg. 10 mL 3    pregabalin (LYRICA) 75 mg capsule Take 1 Capsule by mouth three (3) times daily. Max Daily Amount: 225 mg. 90 Capsule 2    doxycycline (VIBRAMYCIN) 100 mg capsule       naproxen (NAPROSYN) 500 mg tablet       lisinopriL (PRINIVIL, ZESTRIL) 5 mg tablet       omeprazole (PRILOSEC) 40 mg capsule Take 40 mg by mouth daily.  atorvastatin (LIPITOR) 20 mg tablet Take  by mouth daily.  empagliflozin (Jardiance) 10 mg tablet Take  by mouth daily.  metoprolol tartrate (LOPRESSOR) 100 mg IR tablet Take  by mouth two (2) times a day.  aspirin delayed-release 81 mg tablet Take  by mouth daily.  ibuprofen 100 mg tablet Take 100 mg by mouth every six (6) hours as needed for Pain.  tadalafiL (CIALIS) 5 mg tablet Take 1 Tab by mouth daily.  80 Tab 5       Past History     Past Medical History:  Past Medical History:   Diagnosis Date    Arrhythmia     Hx of AFIB    CAD (coronary artery disease)     Diabetes ([address]     Diverticulosis     Dysphagia     GERD (gastroesophageal reflux disease)     Hx of hemorrhoids     Hypercholesterolemia     Hypertension        Past Surgical History:  Past Surgical History:   Procedure Laterality Date    HX COLONOSCOPY      HX ORTHOPAEDIC Left     wrist     HX SHOULDER ARTHROSCOPY Right 06/09/2021    MD CARDIAC SURG PROCEDURE UNLIST      Atrial fibrilation        Family History:  Family History   Problem Relation Age of Onset    Cancer Father        Social History:  Social History     Tobacco Use    Smoking status: Former Smoker     Packs/day: 0.25     Years: 3.00     Pack years: 0.75    Smokeless tobacco: Never Used   Vaping Use    Vaping Use: Never used   Substance Use Topics    Alcohol use: Yes     Alcohol/week: 6.0 standard drinks     Types: 6 Cans of beer per week    Drug use: Not Currently     Types: Marijuana, Cocaine       Allergies:  No Known Allergies      Review of Systems     Review of Systems   Constitutional: Negative for chills and fever. HENT: Positive for ear pain and hearing loss. Negative for congestion and rhinorrhea. Eyes: Negative for photophobia and visual disturbance. Respiratory: Negative for cough and shortness of breath. Cardiovascular: Negative for chest pain and palpitations. Gastrointestinal: Negative for abdominal pain, diarrhea, nausea and vomiting. Genitourinary: Negative for difficulty urinating and dysuria. Musculoskeletal: Negative for arthralgias and myalgias. Skin: Negative for color change and rash. Neurological: Negative for weakness and headaches. Psychiatric/Behavioral: Negative for dysphoric mood and sleep disturbance. Physical Exam     Physical Exam  Constitutional:       General: He is not in acute distress. Appearance: Normal appearance. He is not ill-appearing. HENT:      Head: Normocephalic and atraumatic. Right Ear: External ear normal. Decreased hearing noted. There is impacted cerumen. Left Ear: External ear normal.      Nose: Nose normal.      Mouth/Throat:      Mouth: Mucous membranes are moist.   Eyes:      Extraocular Movements: Extraocular movements intact. Conjunctiva/sclera: Conjunctivae normal.      Pupils: Pupils are equal, round, and reactive to light. Cardiovascular:      Rate and Rhythm: Normal rate and regular rhythm. Pulses: Normal pulses. Pulmonary:      Effort: Pulmonary effort is normal. No respiratory distress. Breath sounds: Normal breath sounds. Abdominal:      General: Abdomen is flat. There is no distension. Musculoskeletal:         General: Normal range of motion. Cervical back: Normal range of motion. Skin:     General: Skin is warm and dry. Neurological:      General: No focal deficit present. Mental Status: He is alert and oriented to person, place, and time. Psychiatric:         Mood and Affect: Mood normal.         Behavior: Behavior normal.         Thought Content: Thought content normal.         Judgment: Judgment normal.         Lab and Diagnostic Study Results     Labs -   No results found for this or any previous visit (from the past 12 hour(s)). Radiologic Studies -   @lastxrresult@  CT Results  (Last 48 hours)    None        CXR Results  (Last 48 hours)    None            Medical Decision Making   - I am the first provider for this patient. - I reviewed the vital signs, available nursing notes, past medical history, past surgical history, family history and social history. - Initial assessment performed. The patients presenting problems have been discussed, and they are in agreement with the care plan formulated and outlined with them. I have encouraged them to ask questions as they arise throughout their visit. Vital Signs-Reviewed the patient's vital signs.   No data found. Records Reviewed: Nursing Notes    The patient presents with right ear pain with a differential diagnosis of OM, OE, mastoiditis, malignant otitis externa, active cerumen, foreign body      ED Course:          Provider Notes (Medical Decision Making):   60-year-old male with chief complaint of right ear pain and decreased hearing ongoing over the course of the last couple of weeks. On physical examination, patient resting comfortably in bed. He has significant cerumen impaction noted to the right external auditory canal, with less noted on the left. Patient is not ataxic. He has no nystagmus. There is no active ear drainage. There is no tenderness with pinna or tragus manipulation. Right external auditory canal irrigated with warm water with significant cerumen removal.  Patient with improved hearing and tolerated procedure without difficulty. Patient instructed to follow-up with their primary care physician and return to the ED if they develops any new or worsening symptoms. MDM       Procedures   Medical Decision Makingedical Decision Making  Performed by: Monica Camp DO  PROCEDURES:  EAR CERUMEN REMOVAL Rene Reyes (ASAP ONLY)    Date/Time: 1/3/2022 3:58 PM  Performed by: Beth Potts DO  Authorized by: Beth Potts DO     Consent:     Consent obtained:  Verbal    Consent given by:  Patient  Procedure details:     Location:  R ear    Procedure type: irrigation    Post-procedure details: Inspection:  TM intact    Hearing quality:  Improved    Patient tolerance of procedure: Tolerated well, no immediate complications           Disposition   Disposition: Condition stable  DC- Adult Discharges: All of the diagnostic tests were reviewed and questions answered. Diagnosis, care plan and treatment options were discussed. The patient understands the instructions and will follow up as directed. The patients results have been reviewed with them.   They have been counseled regarding their diagnosis. The patient verbally convey understanding and agreement of the signs, symptoms, diagnosis, treatment and prognosis and additionally agrees to follow up as recommended with their PCP in 24 - 48 hours. They also agree with the care-plan and convey that all of their questions have been answered. I have also put together some discharge instructions for them that include: 1) educational information regarding their diagnosis, 2) how to care for their diagnosis at home, as well a 3) list of reasons why they would want to return to the ED prior to their follow-up appointment, should their condition change. DC-The patient was given verbal earwax buildup instructions    Discharged    DISCHARGE PLAN:  1. Cannot display discharge medications since this patient is not currently admitted. 2.   Follow-up Information     Follow up With Specialties Details Why Contact Info    Verna Viramontes MD Family Medicine Schedule an appointment as soon as possible for a visit   [address]  [phone]      [address] [facility] Emergency Medicine  As needed, If symptoms worsen [address]  [phone]        3. Return to ED if worse   4. Discharge Medication List as of 1/3/2022  4:20 PM            Diagnosis     Clinical Impression:   1. Hearing loss secondary to cerumen impaction, right        Attestations:    Monica Left, DO    Please note that this dictation was completed with Ondango, the computer voice recognition software. Quite often unanticipated grammatical, syntax, homophones, and other interpretive errors are inadvertently transcribed by the computer software. Please disregard these errors. Please excuse any errors that have escaped final proofreading. Thank you.

## 2022-01-24 ENCOUNTER — APPOINTMENT (OUTPATIENT)
Dept: PHYSICAL THERAPY | Age: 62
End: 2022-01-24

## 2022-01-28 ENCOUNTER — APPOINTMENT (OUTPATIENT)
Dept: PHYSICAL THERAPY | Age: 62
End: 2022-01-28

## 2022-02-11 ENCOUNTER — HOSPITAL ENCOUNTER (OUTPATIENT)
Dept: PHYSICAL THERAPY | Age: 62
Discharge: HOME | End: 2022-02-11
Payer: COMMERCIAL

## 2022-02-11 ENCOUNTER — APPOINTMENT (OUTPATIENT)
Dept: PHYSICAL THERAPY | Age: 62
End: 2022-02-11
Payer: COMMERCIAL

## 2022-02-11 PROCEDURE — 97110 THERAPEUTIC EXERCISES: CPT

## 2022-02-11 PROCEDURE — 97014 ELECTRIC STIMULATION THERAPY: CPT

## 2022-02-11 PROCEDURE — 97140 MANUAL THERAPY 1/> REGIONS: CPT

## 2022-02-11 NOTE — PROGRESS NOTES
PT DAILY TREATMENT NOTE  [facility]     Patient Name: Sara Addison  Date:2022  : 1960  [x]  Patient  Verified  Payor: BLUE CROSS / Plan: [address] [place] [address] / Product Type: PPO /    Treatment Area: Other dorsalgia [M54.89]       Next MD APPT: after PT  In time: 11:40am  Out time:12:35pm  Total Treatment Time (min): 55  Visit #:     SUBJECTIVE  Pain Level (0-10 scale) pre treatment: 8      Pain Level (0-10 scale) post treatment: 6    Any medication changes, allergies to medications, adverse drug reactions, diagnosis change, or new procedure performed?:   [x] No    [] Yes (see summary sheet for update)    Subjective functional status/changes:   [] No changes reported  Pt reporting lower back pain and stiffness with pain radiating to the backs of the legs.       OBJECTIVE    Modality rationale: decrease pain and increase tissue extensibility to improve the patients ability to move around without back/leg pains    Min Type Additional Details   15 [x] Estim: [x]UnAtt   []Att       []TENS instruct                  [x]IFC  []Premod   []NMES                     []Other:  []w/US   []w/ice   [x]w/heat  Position: prone   Location: lumbosacral region     []  Ice     []  Heat  []  Ice massage Position:  Location:    []  Traction: [] Cervical       []Lumbar                       [] Prone          []Supine                       []Intermittent   []Continuous Lbs:  []w/heat  []W/heat and Estim    []  Ultrasound: []Continuous   [] Pulsed at:                           []1MHz   []3MHz Location:  W/cm2:      [x] Skin assessment post-treatment:   [x]intact  []redness- no adverse reaction   []redness - adverse reaction:   30 min Therapeutic Exercise:  [x] See flow sheet :   Rationale: increase ROM to improve the patients ability to move around without back/leg pain   10 min Manual Therapy: STM lumbar paraspinals/QLs/gluteals in prone    Rationale: decrease pain, increase ROM, increase tissue extensibility and decrease trigger points to improve the patients ability to move around without back/leg pain     With   [x] TE   [] TA   [] Neuro   [] SC   [] other: Patient Education: [x] Review HEP   - initiated with handout   [] Progressed/Changed HEP based on:   [] positioning   [] body mechanics   [] transfers   [x] Use of heat/ice     [] other:         Other Objective/Functional Measures: Initiated exercises and HEP. Also performed MT and modalities for pain relief. ASSESSMENT/Changes in Function:   Pt returned to PT for second visit, was awaiting authorization. Pt continues to report lower back and LE pain and parathesia. Pt responded fairly well to tx with reported reduction in intensity of pain into the LEs at end of session. Will continue to work on soft tissue mobilization, ROM/flexibility as tolerated. Initial goals still appropriate at this time. Patient will continue to benefit from skilled PT services to modify and progress therapeutic interventions, address functional mobility deficits, address ROM deficits, address strength deficits, analyze and address soft tissue restrictions, analyze and cue movement patterns and assess and modify postural abnormalities to attain remaining goals. GOALS/Progress towards goals:  Patient Goal (s): \" To improve my mobility and reduce my back problem and be pain free\".     []? Met []? Not met []? Partially met     Short Term Goals: To be accomplished in 2-4  treatments. 1. Patient will be independent with his HEP to progress with POC. []? Met []? Not met []? Partially met   2. Patient pain level will subside to < 4/10 with mobility on the VAS scale. []? Met []? Not met []? Partially met      Long Term Goals: To be accomplished in 12-16  treatments. 1. Patient will report decreased radicular pain and more centralization by 50% since start of therapy. []? Met []? Not met []? Partially met   2.  Patient will gain 5-8 deg of hamstring flexibility to improve lumbar/pelvic mobility. []? Met []? Not met []? Partially met   3. Patient will be able to stand for 20-30 min while during his morning ADL's with less back pain and discomfort. []? Met []? Not met []? Partially met   5. Patient will be able to show good body mechanics technique when lifting items or his grandchildren with less back pain and discomfort. []? Met []? Not met []? Partially met   6. Patient will be able to ambulate for 20-30 min ( such as:doing his grocery shopping ) with less back pain and discomfort. []? Met []? Not met []?  Partially met     PLAN  [x]  Continue plan of care  [x]  Upgrade activities as tolerated      [x]  Update interventions per flow sheet       []  Discharge due to:  []  Other:    Cira Brown, PT, DPT 2/11/2022

## 2022-02-14 PROBLEM — Z91.199 NONCOMPLIANCE: Status: ACTIVE | Noted: 2022-02-14

## 2022-02-23 ENCOUNTER — HOSPITAL ENCOUNTER (EMERGENCY)
Age: 62
Discharge: HOME | End: 2022-02-23
Attending: EMERGENCY MEDICINE
Payer: COMMERCIAL

## 2022-02-23 ENCOUNTER — APPOINTMENT (OUTPATIENT)
Dept: GENERAL RADIOLOGY | Age: 62
End: 2022-02-23
Attending: EMERGENCY MEDICINE
Payer: COMMERCIAL

## 2022-02-23 VITALS
RESPIRATION RATE: 24 BRPM | WEIGHT: 246 LBS | TEMPERATURE: 98.1 F | SYSTOLIC BLOOD PRESSURE: 139 MMHG | OXYGEN SATURATION: 95 % | HEART RATE: 100 BPM | DIASTOLIC BLOOD PRESSURE: 93 MMHG | BODY MASS INDEX: 31.57 KG/M2 | HEIGHT: 74 IN

## 2022-02-23 DIAGNOSIS — R07.9 CHEST PAIN, UNSPECIFIED TYPE: Primary | ICD-10-CM

## 2022-02-23 LAB
ALBUMIN SERPL-MCNC: 3.7 G/DL (ref 3.5–5)
ALBUMIN/GLOB SERPL: 0.9 {RATIO} (ref 1.1–2.2)
ALP SERPL-CCNC: 96 U/L (ref 45–117)
ALT SERPL-CCNC: 63 U/L (ref 12–78)
ANION GAP SERPL CALC-SCNC: 8 MMOL/L (ref 5–15)
AST SERPL W P-5'-P-CCNC: 45 U/L (ref 15–37)
BASOPHILS # BLD: 0 K/UL (ref 0–0.1)
BASOPHILS NFR BLD: 1 % (ref 0–1)
BILIRUB SERPL-MCNC: 0.5 MG/DL (ref 0.2–1)
BUN SERPL-MCNC: 18 MG/DL (ref 6–20)
BUN/CREAT SERPL: 18 (ref 12–20)
CA-I BLD-MCNC: 9.7 MG/DL (ref 8.5–10.1)
CHLORIDE SERPL-SCNC: 98 MMOL/L (ref 97–108)
CO2 SERPL-SCNC: 25 MMOL/L (ref 21–32)
CREAT SERPL-MCNC: 0.99 MG/DL (ref 0.7–1.3)
DIFFERENTIAL METHOD BLD: ABNORMAL
EOSINOPHIL # BLD: 0.2 K/UL (ref 0–0.4)
EOSINOPHIL NFR BLD: 4 % (ref 0–7)
ERYTHROCYTE [DISTWIDTH] IN BLOOD BY AUTOMATED COUNT: 12.8 % (ref 11.5–14.5)
GLOBULIN SER CALC-MCNC: 4.2 G/DL (ref 2–4)
GLUCOSE SERPL-MCNC: 460 MG/DL (ref 65–100)
HCT VFR BLD AUTO: 48.1 % (ref 36.6–50.3)
HGB BLD-MCNC: 16.6 G/DL (ref 12.1–17)
IMM GRANULOCYTES # BLD AUTO: 0 K/UL (ref 0–0.04)
IMM GRANULOCYTES NFR BLD AUTO: 0 % (ref 0–0.5)
LYMPHOCYTES # BLD: 1.9 K/UL (ref 0.8–3.5)
LYMPHOCYTES NFR BLD: 29 % (ref 12–49)
MCH RBC QN AUTO: 28.8 PG (ref 26–34)
MCHC RBC AUTO-ENTMCNC: 34.5 G/DL (ref 30–36.5)
MCV RBC AUTO: 83.5 FL (ref 80–99)
MONOCYTES # BLD: 0.3 K/UL (ref 0–1)
MONOCYTES NFR BLD: 5 % (ref 5–13)
NEUTS SEG # BLD: 3.9 K/UL (ref 1.8–8)
NEUTS SEG NFR BLD: 61 % (ref 32–75)
NRBC # BLD: 0 K/UL (ref 0–0.01)
NRBC BLD-RTO: 0 PER 100 WBC
PLATELET # BLD AUTO: 223 K/UL (ref 150–400)
PMV BLD AUTO: 10.5 FL (ref 8.9–12.9)
POTASSIUM SERPL-SCNC: 4.4 MMOL/L (ref 3.5–5.1)
PROT SERPL-MCNC: 7.9 G/DL (ref 6.4–8.2)
RBC # BLD AUTO: 5.76 M/UL (ref 4.1–5.7)
SODIUM SERPL-SCNC: 131 MMOL/L (ref 136–145)
TROPONIN-HIGH SENSITIVITY: 8 NG/L (ref 0–76)
WBC # BLD AUTO: 6.4 K/UL (ref 4.1–11.1)

## 2022-02-23 PROCEDURE — 36415 COLL VENOUS BLD VENIPUNCTURE: CPT

## 2022-02-23 PROCEDURE — 93005 ELECTROCARDIOGRAM TRACING: CPT

## 2022-02-23 PROCEDURE — 71045 X-RAY EXAM CHEST 1 VIEW: CPT

## 2022-02-23 PROCEDURE — 99285 EMERGENCY DEPT VISIT HI MDM: CPT

## 2022-02-23 PROCEDURE — 84484 ASSAY OF TROPONIN QUANT: CPT

## 2022-02-23 PROCEDURE — 80053 COMPREHEN METABOLIC PANEL: CPT

## 2022-02-23 PROCEDURE — 85025 COMPLETE CBC W/AUTO DIFF WBC: CPT

## 2022-02-23 NOTE — ED PROVIDER NOTES
EMERGENCY DEPARTMENT HISTORY AND PHYSICAL EXAM      Date: 2/23/2022  Patient Name: Ashleigh Hobson      History of Presenting Illness     Chief Complaint   Patient presents with    Chest Pain    Shortness of Breath       History Provided By: Patient    HPI: Ashleigh Hobson, 64 y.o. male with a past medical history significant for HTN, HLD, DM, CAD presents to the ED with cc of CP. Endorsing constant CP that began 2-3d ago, initially R upper chest then became b/l below ribs, worse with exertion and deep breathing, assoc. W/SOB but not N/V/diaphoresis. Further endorses 3d of rhinorrhea, increased cough. Denies LE edema, calf pain, hx of blood clots. Denies F/C. There are no other complaints, changes, or physical findings at this time. PCP: Jacquie Ayala MD    Current Outpatient Medications   Medication Sig Dispense Refill    testosterone cypionate (DEPOTESTOTERONE CYPIONATE) 200 mg/mL injection 1 mL by IntraMUSCular route Once every 2 weeks. Max Daily Amount: 200 mg. 10 mL 3    pregabalin (LYRICA) 75 mg capsule Take 1 Capsule by mouth three (3) times daily. Max Daily Amount: 225 mg. 90 Capsule 2    doxycycline (VIBRAMYCIN) 100 mg capsule       naproxen (NAPROSYN) 500 mg tablet       lisinopriL (PRINIVIL, ZESTRIL) 5 mg tablet       omeprazole (PRILOSEC) 40 mg capsule Take 40 mg by mouth daily.  atorvastatin (LIPITOR) 20 mg tablet Take  by mouth daily.  empagliflozin (Jardiance) 10 mg tablet Take  by mouth daily.  metoprolol tartrate (LOPRESSOR) 100 mg IR tablet Take  by mouth two (2) times a day.  aspirin delayed-release 81 mg tablet Take  by mouth daily.  ibuprofen 100 mg tablet Take 100 mg by mouth every six (6) hours as needed for Pain.  tadalafiL (CIALIS) 5 mg tablet Take 1 Tab by mouth daily.  80 Tab 5       Past History     Past Medical History:  Past Medical History:   Diagnosis Date    Arrhythmia     Hx of AFIB    CAD (coronary artery disease)     [address] [address]     Diverticulosis     Dysphagia     GERD (gastroesophageal reflux disease)     Hx of hemorrhoids     Hypercholesterolemia     Hypertension        Past Surgical History:  Past Surgical History:   Procedure Laterality Date    HX COLONOSCOPY      HX ORTHOPAEDIC Left     wrist     HX SHOULDER ARTHROSCOPY Right 06/09/2021    VT CARDIAC SURG PROCEDURE UNLIST      Atrial fibrilation        Family History:  Family History   Problem Relation Age of Onset    Cancer Father        Social History:  Social History     Tobacco Use    Smoking status: Former Smoker     Packs/day: 0.25     Years: 3.00     Pack years: 0.75    Smokeless tobacco: Never Used   Vaping Use    Vaping Use: Never used   Substance Use Topics    Alcohol use: Yes     Alcohol/week: 6.0 standard drinks     Types: 6 Cans of beer per week    Drug use: Not Currently     Types: Marijuana, Cocaine       Allergies:  No Known Allergies      Review of Systems   Constitutional: Negative except as in HPI. Eyes: Negative except as in HPI.  ENT: Negative except as in HPI. Cardiovascular: Negative except as in HPI. Respiratory: Negative except as in HPI. Gastrointestinal: Negative except as in HPI. Genitourinary: Negative except as in HPI. Musculoskeletal: Negative except as in HPI. Integumentary: Negative except as in HPI. Neurological: Negative except as in HPI. Psychiatric: Negative except as in HPI. Endocrine: Negative except as in HPI. Hematologic/Lymphatic: Negative except as in HPI. Allergic/Immunologic: Negative except as in HPI. Physical Exam   Constitutional: Awake and alert, interactive, NAD  Eyes: PERRL, no injection or scleral icterus, no discharge  HEENT: NCAT, neck supple, MMM, no oropharyngeal exudates  CV: RRR, no m/r/g  Respiratory: CTAB, no r/r/w  GI: Abd soft, nondistended, nontender  : Deferred  MSK: FROM, no joint effusions or edema  Skin: No rashes  Neuro: CN2-12 intact, symmetric facies, fluent speech.   Psych: Well-groomed, normal speech, behavior, appropriate mood    Lab and Diagnostic Study Results     Labs -   No results found for this or any previous visit (from the past 12 hour(s)). Radiologic Studies -   [unfilled]  CT Results  (Last 48 hours)    None        CXR Results  (Last 48 hours)               02/23/22 1103  XR CHEST PORT Final result    Impression:  No acute pulmonary process. Narrative:  Chest, frontal view, 2/23/2022       History: Pneumonia. Comparison: Including chest 3/16/2021. Findings: The cardiac silhouette is within normal limits. The lungs are   adequately expanded. No hydrostatic edema is present. No focal consolidation,   pleural effusions or pneumothorax is identified. No acute osseous findings are   definitively seen. Medical Decision Making and ED Course   - I am the first and primary provider for this patient AND AM THE PRIMARY PROVIDER OF RECORD. - I reviewed the vital signs, available nursing notes, past medical history, past surgical history, family history and social history. - Initial assessment performed. The patients presenting problems have been discussed, and the staff are in agreement with the care plan formulated and outlined with them. I have encouraged them to ask questions as they arise throughout their visit. Vital Signs-Reviewed the patient's vital signs. Patient Vitals for the past 12 hrs:   Temp Pulse Resp BP SpO2   02/23/22 1106 -- -- -- -- 94 %   02/23/22 1019 98.1 °F (36.7 °C) (!) 107 18 136/78 94 %       EKG interpretation: [email or web address], nl QRS/axis, QTc 462, no CARLINE/STD or nonanatomic TWI. Provider Notes (Medical Decision Making):   65W w/CP, SOB, rhinorrhea, likely bronchitis 2/2 viral URI. No s/sx of DVT/PE, particularly given URI sx. EKG nonischemic. Given age and risk factors will get troponin to r/o ACS, CXR to eval for PTX/PNA. Dispo pending workup.     ED Course:       ED Course as of 02/23/22 1201   Wed Feb 23, 2022   1116 XR CHEST PORT    IMPRESSION  No acute pulmonary process. [YA]   1159 Troponin-High Sensitivity: 8 [YA]   9798 HGB: 16.6 [YA]   1840 Will discharge with return precautions. [YA]      ED Course User Index  [YA] Chayo Pop MD         Disposition     Disposition: DC- Adult Discharges: All of the diagnostic tests were reviewed and questions answered. Diagnosis, care plan and treatment options were discussed. The patient understands the instructions and will follow up as directed. The patients results have been reviewed with them. They have been counseled regarding their diagnosis. The patient verbally convey understanding and agreement of the signs, symptoms, diagnosis, treatment and prognosis and additionally agrees to follow up as recommended with their PCP in 24 - 48 hours. They also agree with the care-plan and convey that all of their questions have been answered. I have also put together some discharge instructions for them that include: 1) educational information regarding their diagnosis, 2) how to care for their diagnosis at home, as well a 3) list of reasons why they would want to return to the ED prior to their follow-up appointment, should their condition change. Discharged      Diagnosis     Clinical Impression:   1. Chest pain, unspecified type        Attestations:     Luh Cardenas MD

## 2022-02-23 NOTE — DISCHARGE INSTRUCTIONS
You were seen in the ER for your chest pain and difficulty breathing. Thankfully, we were able to rule out a dangerous cause like a heart attack. This is likely from a virus causing an upper respiratory infection and bronchitis. This will get better with time. Take tylenol or ibuprofen for pain and follow up with your PCP in the next week to make sure your symptoms are getting better. Return to the ER for any new or worsening pain, difficulty breathing or any other new or concerning symptoms. Thank you! Thank you for allowing me to care for you in the emergency department. I sincerely hope that you are satisfied with your visit today. It is my goal to provide you with excellent care. Below you will find a list of your labs and imaging from your visit today. Should you have any questions regarding these results please do not hesitate to call the emergency department. Labs -     Recent Results (from the past 12 hour(s))   TROPONIN-HIGH SENSITIVITY    Collection Time: 02/23/22 11:15 AM   Result Value Ref Range    Troponin-High Sensitivity 8 0 - 76 ng/L   METABOLIC PANEL, COMPREHENSIVE    Collection Time: 02/23/22 11:15 AM   Result Value Ref Range    Sodium 131 (L) 136 - 145 mmol/L    Potassium 4.4 3.5 - 5.1 mmol/L    Chloride 98 97 - 108 mmol/L    CO2 25 21 - 32 mmol/L    Anion gap 8 5 - 15 mmol/L    Glucose 460 (H) 65 - 100 mg/dL    BUN 18 6 - 20 mg/dL    Creatinine 0.99 0.70 - 1.30 mg/dL    BUN/Creatinine ratio 18 12 - 20      GFR est AA >60 >60 ml/min/1.73m2    GFR est non-AA >60 >60 ml/min/1.73m2    Calcium 9.7 8.5 - 10.1 mg/dL    Bilirubin, total 0.5 0.2 - 1.0 mg/dL    AST (SGOT) 45 (H) 15 - 37 U/L    ALT (SGPT) 63 12 - 78 U/L    Alk.  phosphatase 96 45 - 117 U/L    Protein, total 7.9 6.4 - 8.2 g/dL    Albumin 3.7 3.5 - 5.0 g/dL    Globulin 4.2 (H) 2.0 - 4.0 g/dL    A-G Ratio 0.9 (L) 1.1 - 2.2     CBC WITH AUTOMATED DIFF    Collection Time: 02/23/22 11:15 AM   Result Value Ref Range    WBC 6.4 4.1 - 11.1 K/uL    RBC 5.76 (H) 4.10 - 5.70 M/uL    HGB 16.6 12.1 - 17.0 g/dL    HCT 48.1 36.6 - 50.3 %    MCV 83.5 80.0 - 99.0 FL    MCH 28.8 26.0 - 34.0 PG    MCHC 34.5 30.0 - 36.5 g/dL    RDW 12.8 11.5 - 14.5 %    PLATELET 455 571 - 482 K/uL    MPV 10.5 8.9 - 12.9 FL    NRBC 0.0 0.0  WBC    ABSOLUTE NRBC 0.00 0.00 - 0.01 K/uL    NEUTROPHILS 61 32 - 75 %    LYMPHOCYTES 29 12 - 49 %    MONOCYTES 5 5 - 13 %    EOSINOPHILS 4 0 - 7 %    BASOPHILS 1 0 - 1 %    IMMATURE GRANULOCYTES 0 0 - 0.5 %    ABS. NEUTROPHILS 3.9 1.8 - 8.0 K/UL    ABS. LYMPHOCYTES 1.9 0.8 - 3.5 K/UL    ABS. MONOCYTES 0.3 0.0 - 1.0 K/UL    ABS. EOSINOPHILS 0.2 0.0 - 0.4 K/UL    ABS. BASOPHILS 0.0 0.0 - 0.1 K/UL    ABS. IMM. GRANS. 0.0 0.00 - 0.04 K/UL    DF AUTOMATED         Radiologic Studies -   XR CHEST PORT   Final Result   No acute pulmonary process. CT Results  (Last 48 hours)      None          CXR Results  (Last 48 hours)                 02/23/22 1103  XR CHEST PORT Final result    Impression:  No acute pulmonary process. Narrative:  Chest, frontal view, 2/23/2022       History: Pneumonia. Comparison: Including chest 3/16/2021. Findings: The cardiac silhouette is within normal limits. The lungs are   adequately expanded. No hydrostatic edema is present. No focal consolidation,   pleural effusions or pneumothorax is identified. No acute osseous findings are   definitively seen. If you feel that you have not received excellent quality care or timely care, please ask to speak to the nurse manager. Please choose us in the future for your continued health care needs. ------------------------------------------------------------------------------------------------------------  The exam and treatment you received in the Emergency Department were for an urgent problem and are not intended as complete care.  It is important that you follow-up with a doctor, nurse practitioner, or physician assistant to:  (1) confirm your diagnosis,  (2) re-evaluation of changes in your illness and treatment, and  (3) for ongoing care. If your symptoms become worse or you do not improve as expected and you are unable to reach your usual health care provider, you should return to the Emergency Department. We are available 24 hours a day. Please take your discharge instructions with you when you go to your follow-up appointment. If you have any problem arranging a follow-up appointment, contact the Emergency Department immediately. If a prescription has been provided, please have it filled as soon as possible to prevent a delay in treatment. Read the entire medication instruction sheet provided to you by the pharmacy. If you have any questions or reservations about taking the medication due to side effects or interactions with other medications, please call your primary care physician or contact the ER to speak with the charge nurse. Make an appointment with your family doctor or the physician you were referred to for follow-up of this visit as instructed on your discharge paperwork, as this is a mandatory follow-up. Return to the ER if you are unable to be seen or if you are unable to be seen in a timely manner. If you have any problem arranging the follow-up visit, contact the Emergency Department immediately.

## 2022-02-24 LAB
ATRIAL RATE: 104 BPM
CALCULATED P AXIS, ECG09: 73 DEGREES
CALCULATED R AXIS, ECG10: 55 DEGREES
CALCULATED T AXIS, ECG11: 85 DEGREES
DIAGNOSIS, 93000: NORMAL
P-R INTERVAL, ECG05: 164 MS
Q-T INTERVAL, ECG07: 352 MS
QRS DURATION, ECG06: 86 MS
QTC CALCULATION (BEZET), ECG08: 462 MS
VENTRICULAR RATE, ECG03: 104 BPM

## 2022-03-18 PROBLEM — E11.42 DIABETIC POLYNEUROPATHY ASSOCIATED WITH TYPE 2 DIABETES MELLITUS (HCC): Status: ACTIVE | Noted: 2020-11-11

## 2022-03-18 PROBLEM — R35.0 URINARY FREQUENCY: Status: ACTIVE | Noted: 2021-10-04

## 2022-03-18 PROBLEM — I10 ESSENTIAL HYPERTENSION: Status: ACTIVE | Noted: 2020-12-09

## 2022-03-18 PROBLEM — N40.0 BENIGN PROSTATIC HYPERPLASIA: Status: ACTIVE | Noted: 2020-12-09

## 2022-03-19 PROBLEM — N52.8 OTHER MALE ERECTILE DYSFUNCTION: Status: ACTIVE | Noted: 2020-11-11

## 2022-03-19 PROBLEM — E11.42 TYPE 2 DIABETES MELLITUS WITH DIABETIC POLYNEUROPATHY, WITHOUT LONG-TERM CURRENT USE OF INSULIN (HCC): Status: ACTIVE | Noted: 2020-11-11

## 2022-03-19 PROBLEM — Z12.5 PROSTATE CANCER SCREENING: Status: ACTIVE | Noted: 2021-09-21

## 2022-03-20 PROBLEM — L03.032 PARONYCHIA OF GREAT TOE OF LEFT FOOT: Status: ACTIVE | Noted: 2020-11-11

## 2022-03-20 PROBLEM — R79.89 LOW TESTOSTERONE IN MALE: Status: ACTIVE | Noted: 2021-01-11

## 2022-03-20 PROBLEM — Z91.199 NONCOMPLIANCE: Status: ACTIVE | Noted: 2022-02-14

## 2022-04-06 ENCOUNTER — OFFICE VISIT (OUTPATIENT)
Dept: PODIATRY | Age: 62
End: 2022-04-06
Payer: COMMERCIAL

## 2022-04-06 VITALS
HEIGHT: 74 IN | SYSTOLIC BLOOD PRESSURE: 127 MMHG | TEMPERATURE: 96.9 F | HEART RATE: 106 BPM | BODY MASS INDEX: 32.42 KG/M2 | WEIGHT: 252.6 LBS | OXYGEN SATURATION: 96 % | DIASTOLIC BLOOD PRESSURE: 67 MMHG

## 2022-04-06 DIAGNOSIS — E11.42 DIABETIC POLYNEUROPATHY ASSOCIATED WITH TYPE 2 DIABETES MELLITUS (HCC): Primary | ICD-10-CM

## 2022-04-06 PROCEDURE — 99213 OFFICE O/P EST LOW 20 MIN: CPT | Performed by: PODIATRIST

## 2022-04-06 NOTE — PROGRESS NOTES
[facility]    Subjective:         Patient is a 64 y.o. male who is being seen as a returning pt for B/L neuropathy and pain. Pt states the lyrica 75mg PO TID has helped slightly. . Would like to discuss additional tx options. States the pain rises to the level of 5/10 (down from 7/10). Is worse in the evenings. Denies any local/systemic signs of infx. Denies any recent trauma. Denies any other pedal complaints. As an aside, pt states his DM T2 has been out of control and would like to discuss    Past Medical History:   Diagnosis Date    Arrhythmia     Hx of AFIB    CAD (coronary artery disease)     Diabetes ([address]     Diverticulosis     Dysphagia     GERD (gastroesophageal reflux disease)     Hx of hemorrhoids     Hypercholesterolemia     Hypertension      Past Surgical History:   Procedure Laterality Date    HX COLONOSCOPY      HX ORTHOPAEDIC Left     wrist     HX SHOULDER ARTHROSCOPY Right 06/09/2021    GA CARDIAC SURG PROCEDURE UNLIST      Atrial fibrilation        Family History   Problem Relation Age of Onset    Cancer Father       Social History     Tobacco Use    Smoking status: Former Smoker     Packs/day: 0.25     Years: 3.00     Pack years: 0.75    Smokeless tobacco: Never Used   Substance Use Topics    Alcohol use: Yes     Alcohol/week: 6.0 standard drinks     Types: 6 Cans of beer per week     No Known Allergies  Prior to Admission medications    Medication Sig Start Date End Date Taking? Authorizing Provider   testosterone cypionate (DEPOTESTOTERONE CYPIONATE) 200 mg/mL injection 1 mL by IntraMUSCular route Once every 2 weeks. Max Daily Amount: 200 mg. 12/17/21  Yes Suzette Duran NP   pregabalin (LYRICA) 75 mg capsule Take 1 Capsule by mouth three (3) times daily.  Max Daily Amount: 225 mg. 10/12/21  Yes Ramon Nava DPM   naproxen (NAPROSYN) 500 mg tablet  4/20/21  Yes Provider, Historical   lisinopriL (PRINIVIL, ZESTRIL) 5 mg tablet  3/11/21  Yes Provider, Historical   omeprazole (PRILOSEC) 40 mg capsule Take 40 mg by mouth daily. Yes Provider, Historical   atorvastatin (LIPITOR) 20 mg tablet Take  by mouth daily. Yes Provider, Historical   empagliflozin (Jardiance) 10 mg tablet Take  by mouth daily. Yes Provider, Historical   metoprolol tartrate (LOPRESSOR) 100 mg IR tablet Take  by mouth two (2) times a day. Yes Provider, Historical   aspirin delayed-release 81 mg tablet Take  by mouth daily. Yes Provider, Historical   ibuprofen 100 mg tablet Take 100 mg by mouth every six (6) hours as needed for Pain. Yes Provider, Historical   tadalafiL (CIALIS) 5 mg tablet Take 1 Tab by mouth daily. 12/9/20  Yes Pawan Hernandez MD   doxycycline (VIBRAMYCIN) 100 mg capsule  6/9/21   Provider, Historical       Review of Systems   Constitutional: Negative. HENT: Negative. Eyes: Negative. Respiratory: Negative. Cardiovascular: Negative. Gastrointestinal: Negative. Endocrine: Negative. Genitourinary: Negative. Musculoskeletal: Negative. Allergic/Immunologic: Positive for immunocompromised state. Neurological: Positive for numbness. Hematological: Negative. Psychiatric/Behavioral: Negative. All other systems reviewed and are negative. Objective:     Visit Vitals  /67 (BP 1 Location: Right upper arm, BP Patient Position: Sitting, BP Cuff Size: Adult)   Pulse (!) 106   Temp 96.9 °F (36.1 °C) (Temporal)   Ht 6' 2\" (1.88 m)   Wt 252 lb 9.6 oz (114.6 kg)   SpO2 96%   BMI 32.43 kg/m²       Physical Exam  Vitals reviewed. Constitutional:       Appearance: He is obese. Cardiovascular:      Pulses:           Dorsalis pedis pulses are 2+ on the right side and 2+ on the left side. Posterior tibial pulses are 2+ on the right side and 2+ on the left side. Pulmonary:      Effort: Pulmonary effort is normal.   Musculoskeletal:      Right lower leg: No edema. Left lower leg: No edema.       Right foot: Normal range of motion. No deformity or bunion. Left foot: Normal range of motion. No deformity or bunion. Feet:      Right foot:      Protective Sensation: 10 sites tested. 0 sites sensed. Skin integrity: Skin integrity normal.      Toenail Condition: Right toenails are normal.      Left foot:      Protective Sensation: 10 sites tested. 0 sites sensed. Skin integrity: Callus and dry skin present. Toenail Condition: Left toenails are ingrown. Lymphadenopathy:      Lower Body: No right inguinal adenopathy. No left inguinal adenopathy. Skin:     General: Skin is warm. Capillary Refill: Capillary refill takes 2 to 3 seconds. Neurological:      Mental Status: He is alert and oriented to person, place, and time. Psychiatric:         Mood and Affect: Mood and affect normal.         Behavior: Behavior is cooperative. Data Review: No results found for this or any previous visit (from the past 24 hour(s)). Impression:       ICD-10-CM ICD-9-CM    1. Diabetic polyneuropathy associated with type 2 diabetes mellitus (HCC)  E11.42 250.60 REFERRAL TO ENDOCRINOLOGY     357.2        Recommendation:     Patient seen and evaluated in the office  Discussed and educated patient regarding their current medical condition. Instructed patient to monitor their feet daily, be compliant with all medications and wear supportive shoe gear. In depth convo had regarding medication management of his peripheral neuropathy, pt to cont with the lyrica 75mg to be taken TID PO  Referral given for endocrinology for better control of his DM T2        Gurpreet Ojeda, [facility], [address] [place] [address] and [facility]  [address] [facility], [address] [place] [address], [address] [place] [address]  O: [phone]  F: [phone]  C: [phone]

## 2022-04-06 NOTE — PROGRESS NOTES
1. Have you been to the ER, urgent care clinic since your last visit? Hospitalized since your last visit? No    2. Have you seen or consulted any other health care providers outside of the [address] [place] [address] since your last visit? Include any pap smears or colon screening.  No    Chief Complaint   Patient presents with    Follow-up     neuropathy; pt states he is still having constant numbness and tingling

## 2022-05-11 ENCOUNTER — HOSPITAL ENCOUNTER (EMERGENCY)
Age: 62
Discharge: HOME | End: 2022-05-11
Attending: EMERGENCY MEDICINE
Payer: COMMERCIAL

## 2022-05-11 ENCOUNTER — APPOINTMENT (OUTPATIENT)
Dept: GENERAL RADIOLOGY | Age: 62
End: 2022-05-11
Attending: EMERGENCY MEDICINE
Payer: COMMERCIAL

## 2022-05-11 VITALS
RESPIRATION RATE: 16 BRPM | BODY MASS INDEX: 32.34 KG/M2 | HEIGHT: 74 IN | WEIGHT: 252 LBS | TEMPERATURE: 97.9 F | DIASTOLIC BLOOD PRESSURE: 77 MMHG | OXYGEN SATURATION: 99 % | HEART RATE: 85 BPM | SYSTOLIC BLOOD PRESSURE: 128 MMHG

## 2022-05-11 DIAGNOSIS — L97.529 DIABETIC ULCER OF TOE OF LEFT FOOT ASSOCIATED WITH DIABETES MELLITUS OF OTHER TYPE, UNSPECIFIED ULCER STAGE (HCC): Primary | ICD-10-CM

## 2022-05-11 DIAGNOSIS — E13.621 DIABETIC ULCER OF TOE OF LEFT FOOT ASSOCIATED WITH DIABETES MELLITUS OF OTHER TYPE, UNSPECIFIED ULCER STAGE (HCC): Primary | ICD-10-CM

## 2022-05-11 LAB
ANION GAP SERPL CALC-SCNC: 12 MMOL/L (ref 5–15)
BASOPHILS # BLD: 0 K/UL (ref 0–0.1)
BASOPHILS NFR BLD: 0 % (ref 0–1)
BUN SERPL-MCNC: 14 MG/DL (ref 6–20)
BUN/CREAT SERPL: 12 (ref 12–20)
CA-I BLD-MCNC: 10 MG/DL (ref 8.5–10.1)
CHLORIDE SERPL-SCNC: 95 MMOL/L (ref 97–108)
CO2 SERPL-SCNC: 25 MMOL/L (ref 21–32)
CREAT SERPL-MCNC: 1.15 MG/DL (ref 0.7–1.3)
CRP SERPL-MCNC: 1.39 MG/DL (ref 0–0.6)
DIFFERENTIAL METHOD BLD: NORMAL
EOSINOPHIL # BLD: 0.2 K/UL (ref 0–0.4)
EOSINOPHIL NFR BLD: 3 % (ref 0–7)
ERYTHROCYTE [DISTWIDTH] IN BLOOD BY AUTOMATED COUNT: 12.5 % (ref 11.5–14.5)
ERYTHROCYTE [SEDIMENTATION RATE] IN BLOOD: 46 MM/HR (ref 0–20)
GLUCOSE BLD STRIP.AUTO-MCNC: 323 MG/DL (ref 65–117)
GLUCOSE SERPL-MCNC: 539 MG/DL (ref 65–100)
HCT VFR BLD AUTO: 44.8 % (ref 36.6–50.3)
HGB BLD-MCNC: 16 G/DL (ref 12.1–17)
IMM GRANULOCYTES # BLD AUTO: 0 K/UL (ref 0–0.04)
IMM GRANULOCYTES NFR BLD AUTO: 0 % (ref 0–0.5)
LYMPHOCYTES # BLD: 1.4 K/UL (ref 0.8–3.5)
LYMPHOCYTES NFR BLD: 20 % (ref 12–49)
MCH RBC QN AUTO: 29.9 PG (ref 26–34)
MCHC RBC AUTO-ENTMCNC: 35.7 G/DL (ref 30–36.5)
MCV RBC AUTO: 83.6 FL (ref 80–99)
MONOCYTES # BLD: 0.4 K/UL (ref 0–1)
MONOCYTES NFR BLD: 5 % (ref 5–13)
NEUTS SEG # BLD: 5.2 K/UL (ref 1.8–8)
NEUTS SEG NFR BLD: 72 % (ref 32–75)
PERFORMED BY, TECHID: ABNORMAL
PLATELET # BLD AUTO: 245 K/UL (ref 150–400)
PMV BLD AUTO: 10.8 FL (ref 8.9–12.9)
POTASSIUM SERPL-SCNC: 4.4 MMOL/L (ref 3.5–5.1)
RBC # BLD AUTO: 5.36 M/UL (ref 4.1–5.7)
SODIUM SERPL-SCNC: 132 MMOL/L (ref 136–145)
WBC # BLD AUTO: 7.2 K/UL (ref 4.1–11.1)

## 2022-05-11 PROCEDURE — 96361 HYDRATE IV INFUSION ADD-ON: CPT

## 2022-05-11 PROCEDURE — 82962 GLUCOSE BLOOD TEST: CPT

## 2022-05-11 PROCEDURE — 99284 EMERGENCY DEPT VISIT MOD MDM: CPT

## 2022-05-11 PROCEDURE — 85025 COMPLETE CBC W/AUTO DIFF WBC: CPT

## 2022-05-11 PROCEDURE — 85652 RBC SED RATE AUTOMATED: CPT

## 2022-05-11 PROCEDURE — 96365 THER/PROPH/DIAG IV INF INIT: CPT

## 2022-05-11 PROCEDURE — 87040 BLOOD CULTURE FOR BACTERIA: CPT

## 2022-05-11 PROCEDURE — 80048 BASIC METABOLIC PNL TOTAL CA: CPT

## 2022-05-11 PROCEDURE — 74011250636 HC RX REV CODE- 250/636: Performed by: EMERGENCY MEDICINE

## 2022-05-11 PROCEDURE — 74011000258 HC RX REV CODE- 258: Performed by: EMERGENCY MEDICINE

## 2022-05-11 PROCEDURE — 86140 C-REACTIVE PROTEIN: CPT

## 2022-05-11 PROCEDURE — 73630 X-RAY EXAM OF FOOT: CPT

## 2022-05-11 RX ORDER — AMOXICILLIN AND CLAVULANATE POTASSIUM 875; 125 MG/1; MG/1
1 TABLET, FILM COATED ORAL 2 TIMES DAILY
Qty: 20 TABLET | Refills: 0 | Status: ON HOLD | OUTPATIENT
Start: 2022-05-11 | End: 2022-05-19 | Stop reason: SDUPTHER

## 2022-05-11 RX ORDER — SULFAMETHOXAZOLE AND TRIMETHOPRIM 800; 160 MG/1; MG/1
2 TABLET ORAL 2 TIMES DAILY
Qty: 40 TABLET | Refills: 0 | Status: SHIPPED | OUTPATIENT
Start: 2022-05-11 | End: 2022-05-17

## 2022-05-11 RX ADMIN — PIPERACILLIN SODIUM AND TAZOBACTAM SODIUM 4.5 G: 4; .5 INJECTION, POWDER, LYOPHILIZED, FOR SOLUTION INTRAVENOUS at 11:36

## 2022-05-11 RX ADMIN — SODIUM CHLORIDE 1000 ML: 9 INJECTION, SOLUTION INTRAVENOUS at 12:46

## 2022-05-11 NOTE — ED TRIAGE NOTES
Hx of neuropathy x 1 year, Saw Dr. Baumann  in Feb or March and he referred pt to Dr. Debbie Vega, she cant see pt till Next month,  Past 2 wks swelling and bruising cut to dorsal side to left 1st and 2 toe, states tingling to both hands and feet, NIH 0, Pt is able to walk no limp noted. PMS good to all extremeites.

## 2022-05-11 NOTE — ED PROVIDER NOTES
EMERGENCY DEPARTMENT HISTORY AND PHYSICAL EXAM      Date: 5/11/2022  Patient Name: Kingsley Sauceda    History of Presenting Illness     Chief Complaint   Patient presents with    Toe Pain    Finger Pain       History Provided By: Patient    HPI: Kingsley Sauceda, 64 y.o. male with a past medical history significant diabetes and hypertension presents to the ED with cc of left first and second toe numbness. Patient ports history of neuropathy over the past year, has seen podiatry and is awaiting endocrine referral.  Patient reports also neuropathy/tingling to both of his hands as well as his feet. Patient denies fevers or chills, denies gait difficulty. There are no other complaints, changes, or physical findings at this time. PCP: Bruce Lamb MD    No current facility-administered medications on file prior to encounter. Current Outpatient Medications on File Prior to Encounter   Medication Sig Dispense Refill    testosterone cypionate (DEPOTESTOTERONE CYPIONATE) 200 mg/mL injection 1 mL by IntraMUSCular route Once every 2 weeks. Max Daily Amount: 200 mg. 10 mL 3    pregabalin (LYRICA) 75 mg capsule Take 1 Capsule by mouth three (3) times daily. Max Daily Amount: 225 mg. 90 Capsule 2    doxycycline (VIBRAMYCIN) 100 mg capsule  (Patient not taking: Reported on 4/6/2022)      naproxen (NAPROSYN) 500 mg tablet       lisinopriL (PRINIVIL, ZESTRIL) 5 mg tablet       omeprazole (PRILOSEC) 40 mg capsule Take 40 mg by mouth daily.  atorvastatin (LIPITOR) 20 mg tablet Take  by mouth daily.  empagliflozin (Jardiance) 10 mg tablet Take  by mouth daily.  metoprolol tartrate (LOPRESSOR) 100 mg IR tablet Take  by mouth two (2) times a day.  aspirin delayed-release 81 mg tablet Take  by mouth daily.  ibuprofen 100 mg tablet Take 100 mg by mouth every six (6) hours as needed for Pain.  tadalafiL (CIALIS) 5 mg tablet Take 1 Tab by mouth daily.  90 Tab 5       Past History Past Medical History:  Past Medical History:   Diagnosis Date    Arrhythmia     Hx of AFIB    CAD (coronary artery disease)     Diabetes ([address]     Diverticulosis     Dysphagia     GERD (gastroesophageal reflux disease)     Hx of hemorrhoids     Hypercholesterolemia     Hypertension        Past Surgical History:  Past Surgical History:   Procedure Laterality Date    HX COLONOSCOPY      HX ORTHOPAEDIC Left     wrist     HX SHOULDER ARTHROSCOPY Right 06/09/2021    DC CARDIAC SURG PROCEDURE UNLIST      Atrial fibrilation        Family History:  Family History   Problem Relation Age of Onset    Cancer Father        Social History:  Social History     Tobacco Use    Smoking status: Former Smoker     Packs/day: 0.25     Years: 3.00     Pack years: 0.75    Smokeless tobacco: Never Used   Vaping Use    Vaping Use: Never used   Substance Use Topics    Alcohol use: Yes     Alcohol/week: 6.0 standard drinks     Types: 6 Cans of beer per week    Drug use: Not Currently     Types: Marijuana, Cocaine       Allergies:  No Known Allergies      Review of Systems   Review of Systems   Constitutional: Negative for chills and fever. HENT: Negative for sinus pressure and sinus pain. Eyes: Negative for photophobia and redness. Respiratory: Negative for shortness of breath and wheezing. Cardiovascular: Negative for chest pain and palpitations. Gastrointestinal: Negative for abdominal pain and nausea. Genitourinary: Negative for flank pain and hematuria. Musculoskeletal: Negative for arthralgias and gait problem. Skin: Negative for color change and pallor. Neurological: Negative for dizziness and positive for generalized weakness. Review of Systems    Physical Exam   Physical Exam  Constitutional:       General: No acute distress. Appearance: Normal appearance. Not toxic-appearing. HENT:      Head: Normocephalic and atraumatic.       Nose: Nose normal.      Mouth/Throat:      Mouth: Mucous membranes are moist.   Eyes:      Extraocular Movements: Extraocular movements intact. Pupils: Pupils are equal, round, and reactive to light. Cardiovascular:      Rate and Rhythm: Normal rate. Pulses: Normal pulses. Pulmonary:      Effort: Pulmonary effort is normal.      Breath sounds: No stridor. Abdominal:      General: Abdomen is flat. There is no distension. Musculoskeletal:         General: Normal range of motion. Cervical back: Normal range of motion and neck supple. Skin:     General: Skin is warm and dry. Left first and second toe with numbness to palpation, chronic appearing skin changes noted     Capillary Refill: Capillary refill takes less than 2 seconds. Neurological:      General: No focal deficit present. Mental Status: Aert and oriented to person, place, and time. Psychiatric:         Mood and Affect: Mood normal.         Behavior: Behavior normal.       Physical Exam    Lab and Diagnostic Study Results     Labs -     Recent Results (from the past 12 hour(s))   CBC WITH AUTOMATED DIFF    Collection Time: 05/11/22 11:15 AM   Result Value Ref Range    WBC 7.2 4.1 - 11.1 K/uL    RBC 5.36 4.10 - 5.70 M/uL    HGB 16.0 12.1 - 17.0 g/dL    HCT 44.8 36.6 - 50.3 %    MCV 83.6 80.0 - 99.0 FL    MCH 29.9 26.0 - 34.0 PG    MCHC 35.7 30.0 - 36.5 g/dL    RDW 12.5 11.5 - 14.5 %    PLATELET 939 543 - 063 K/uL    MPV 10.8 8.9 - 12.9 FL    NEUTROPHILS 72 32 - 75 %    LYMPHOCYTES 20 12 - 49 %    MONOCYTES 5 5 - 13 %    EOSINOPHILS 3 0 - 7 %    BASOPHILS 0 0 - 1 %    IMMATURE GRANULOCYTES 0 0.0 - 0.5 %    ABS. NEUTROPHILS 5.2 1.8 - 8.0 K/UL    ABS. LYMPHOCYTES 1.4 0.8 - 3.5 K/UL    ABS. MONOCYTES 0.4 0.0 - 1.0 K/UL    ABS. EOSINOPHILS 0.2 0.0 - 0.4 K/UL    ABS. BASOPHILS 0.0 0.0 - 0.1 K/UL    ABS. IMM.  GRANS. 0.0 0.00 - 0.04 K/UL    DF AUTOMATED     METABOLIC PANEL, BASIC    Collection Time: 05/11/22 11:15 AM   Result Value Ref Range    Sodium 132 (L) 136 - 145 mmol/L Potassium 4.4 3.5 - 5.1 mmol/L    Chloride 95 (L) 97 - 108 mmol/L    CO2 25 21 - 32 mmol/L    Anion gap 12 5 - 15 mmol/L    Glucose 539 (H) 65 - 100 mg/dL    BUN 14 6 - 20 mg/dL    Creatinine 1.15 0.70 - 1.30 mg/dL    BUN/Creatinine ratio 12 12 - 20      GFR est AA >60 >60 ml/min/1.73m2    GFR est non-AA >60 >60 ml/min/1.73m2    Calcium 10.0 8.5 - 10.1 mg/dL   C REACTIVE PROTEIN, QT    Collection Time: 05/11/22 11:15 AM   Result Value Ref Range    C-Reactive protein 1.39 (H) 0.00 - 0.60 mg/dL   SED RATE (ESR)    Collection Time: 05/11/22 11:15 AM   Result Value Ref Range    Sed rate, automated 46 (H) 0 - 20 mm/hr   GLUCOSE, POC    Collection Time: 05/11/22  3:10 PM   Result Value Ref Range    Glucose (POC) 323 (H) 65 - 117 mg/dL    Performed by Adelaida Ora        Radiologic Studies -   @[email or web address]@  CT Results  (Last 48 hours)    None        CXR Results  (Last 48 hours)    None            Medical Decision Making   - I am the first provider for this patient. - I reviewed the vital signs, available nursing notes, past medical history, past surgical history, family history and social history. - Initial assessment performed. The patients presenting problems have been discussed, and they are in agreement with the care plan formulated and outlined with them. I have encouraged them to ask questions as they arise throughout their visit. Vital Signs-Reviewed the patient's vital signs. Patient Vitals for the past 12 hrs:   Temp Pulse Resp BP SpO2   05/11/22 1500 -- 85 16 128/77 99 %   05/11/22 1037 97.9 °F (36.6 °C) 96 16 126/80 99 %           Disposition   Disposition: DC- Adult Discharges: All of the diagnostic tests were reviewed and questions answered. Diagnosis, care plan and treatment options were discussed. The patient understands the instructions and will follow up as directed. The patients results have been reviewed with them. They have been counseled regarding their diagnosis.   The patient verbally convey understanding and agreement of the signs, symptoms, diagnosis, treatment and prognosis and additionally agrees to follow up as recommended with their PCP in 24 - 48 hours. They also agree with the care-plan and convey that all of their questions have been answered. I have also put together some discharge instructions for them that include: 1) educational information regarding their diagnosis, 2) how to care for their diagnosis at home, as well a 3) list of reasons why they would want to return to the ED prior to their follow-up appointment, should their condition change. Discharged    DISCHARGE PLAN:  1. Current Discharge Medication List      CONTINUE these medications which have NOT CHANGED    Details   testosterone cypionate (DEPOTESTOTERONE CYPIONATE) 200 mg/mL injection 1 mL by IntraMUSCular route Once every 2 weeks. Max Daily Amount: 200 mg. Qty: 10 mL, Refills: 3    Associated Diagnoses: Low testosterone in male      pregabalin (LYRICA) 75 mg capsule Take 1 Capsule by mouth three (3) times daily. Max Daily Amount: 225 mg.  Qty: 90 Capsule, Refills: 2    Associated Diagnoses: Diabetic polyneuropathy associated with type 2 diabetes mellitus (HCC)      doxycycline (VIBRAMYCIN) 100 mg capsule       naproxen (NAPROSYN) 500 mg tablet       lisinopriL (PRINIVIL, ZESTRIL) 5 mg tablet       omeprazole (PRILOSEC) 40 mg capsule Take 40 mg by mouth daily. atorvastatin (LIPITOR) 20 mg tablet Take  by mouth daily. empagliflozin (Jardiance) 10 mg tablet Take  by mouth daily. metoprolol tartrate (LOPRESSOR) 100 mg IR tablet Take  by mouth two (2) times a day. aspirin delayed-release 81 mg tablet Take  by mouth daily. ibuprofen 100 mg tablet Take 100 mg by mouth every six (6) hours as needed for Pain.      tadalafiL (CIALIS) 5 mg tablet Take 1 Tab by mouth daily. Qty: 90 Tab, Refills: 5           2.    Follow-up Information     Follow up With Specialties Details Why Contact Info Enmanuel Juarez DPM Podiatry Schedule an appointment as soon as possible for a visit   [address] [place] [address]  [phone]          3. Return to ED if worse   4. Current Discharge Medication List      START taking these medications    Details   trimethoprim-sulfamethoxazole (Bactrim DS) 160-800 mg per tablet Take 2 Tablets by mouth two (2) times a day for 10 days. Qty: 40 Tablet, Refills: 0  Start date: 5/11/2022, End date: 5/21/2022      amoxicillin-clavulanate (Augmentin) 875-125 mg per tablet Take 1 Tablet by mouth two (2) times a day for 10 days. Qty: 20 Tablet, Refills: 0  Start date: 5/11/2022, End date: 5/21/2022               Diagnosis     Clinical Impression:   1. Diabetic ulcer of toe of left foot associated with diabetes mellitus of other type, unspecified ulcer stage [facility])        Attestations:    Jake Escalera MD    Please note that this dictation was completed with [employer], the [employer] voice recognition software. Quite often unanticipated grammatical, syntax, homophones, and other interpretive errors are inadvertently transcribed by the computer software. Please disregard these errors. Please excuse any errors that have escaped final proofreading. Thank you.

## 2022-05-11 NOTE — ED NOTES
Pt was sleeping and right arm was been stopping flow of fluid, straightened arm and placed fluids on pump.

## 2022-05-11 NOTE — Clinical Note
[address] [facility] EMERGENCY DEPARTMENT  [address] [facility] [id number] [phone]    Work/School Note    Date: 5/11/2022    To Whom It May concern:    Samuel Sequeira was seen and treated today in the emergency room by the following provider(s):  Attending Provider: Rosemary Sexton MD.      Samuel Sequeira is excused from work/school on 5/11/2022 through 5/13/2022. He is medically clear to return to work/school on 5/14/2022.          Sincerely,          Ashley Morrow MD

## 2022-05-11 NOTE — DISCHARGE INSTRUCTIONS
Thank you! Thank you for allowing me to care for you in the emergency department. I sincerely hope that you are satisfied with your visit today. It is my goal to provide you with excellent care. Below you will find a list of your labs and imaging from your visit today. Should you have any questions regarding these results please do not hesitate to call the emergency department. Labs -     Recent Results (from the past 12 hour(s))   CBC WITH AUTOMATED DIFF    Collection Time: 05/11/22 11:15 AM   Result Value Ref Range    WBC 7.2 4.1 - 11.1 K/uL    RBC 5.36 4.10 - 5.70 M/uL    HGB 16.0 12.1 - 17.0 g/dL    HCT 44.8 36.6 - 50.3 %    MCV 83.6 80.0 - 99.0 FL    MCH 29.9 26.0 - 34.0 PG    MCHC 35.7 30.0 - 36.5 g/dL    RDW 12.5 11.5 - 14.5 %    PLATELET 623 953 - 306 K/uL    MPV 10.8 8.9 - 12.9 FL    NEUTROPHILS 72 32 - 75 %    LYMPHOCYTES 20 12 - 49 %    MONOCYTES 5 5 - 13 %    EOSINOPHILS 3 0 - 7 %    BASOPHILS 0 0 - 1 %    IMMATURE GRANULOCYTES 0 0.0 - 0.5 %    ABS. NEUTROPHILS 5.2 1.8 - 8.0 K/UL    ABS. LYMPHOCYTES 1.4 0.8 - 3.5 K/UL    ABS. MONOCYTES 0.4 0.0 - 1.0 K/UL    ABS. EOSINOPHILS 0.2 0.0 - 0.4 K/UL    ABS. BASOPHILS 0.0 0.0 - 0.1 K/UL    ABS. IMM.  GRANS. 0.0 0.00 - 0.04 K/UL    DF AUTOMATED     METABOLIC PANEL, BASIC    Collection Time: 05/11/22 11:15 AM   Result Value Ref Range    Sodium 132 (L) 136 - 145 mmol/L    Potassium 4.4 3.5 - 5.1 mmol/L    Chloride 95 (L) 97 - 108 mmol/L    CO2 25 21 - 32 mmol/L    Anion gap 12 5 - 15 mmol/L    Glucose 539 (H) 65 - 100 mg/dL    BUN 14 6 - 20 mg/dL    Creatinine 1.15 0.70 - 1.30 mg/dL    BUN/Creatinine ratio 12 12 - 20      GFR est AA >60 >60 ml/min/1.73m2    GFR est non-AA >60 >60 ml/min/1.73m2    Calcium 10.0 8.5 - 10.1 mg/dL   C REACTIVE PROTEIN, QT    Collection Time: 05/11/22 11:15 AM   Result Value Ref Range    C-Reactive protein 1.39 (H) 0.00 - 0.60 mg/dL   SED RATE (ESR)    Collection Time: 05/11/22 11:15 AM   Result Value Ref Range    Sed rate, automated 46 (H) 0 - 20 mm/hr   GLUCOSE, POC    Collection Time: 05/11/22  3:10 PM   Result Value Ref Range    Glucose (POC) 323 (H) 65 - 117 mg/dL    Performed by Margi Dumont        Radiologic Studies -   XR FOOT LT MIN 3 V   Final Result        CT Results  (Last 48 hours)      None          CXR Results  (Last 48 hours)      None               If you feel that you have not received excellent quality care or timely care, please ask to speak to the nurse manager. Please choose us in the future for your continued health care needs. ------------------------------------------------------------------------------------------------------------  The exam and treatment you received in the Emergency Department were for an urgent problem and are not intended as complete care. It is important that you follow-up with a doctor, nurse practitioner, or physician assistant to:  (1) confirm your diagnosis,  (2) re-evaluation of changes in your illness and treatment, and  (3) for ongoing care. If your symptoms become worse or you do not improve as expected and you are unable to reach your usual health care provider, you should return to the Emergency Department. We are available 24 hours a day. Please take your discharge instructions with you when you go to your follow-up appointment. If you have any problem arranging a follow-up appointment, contact the Emergency Department immediately. If a prescription has been provided, please have it filled as soon as possible to prevent a delay in treatment. Read the entire medication instruction sheet provided to you by the pharmacy. If you have any questions or reservations about taking the medication due to side effects or interactions with other medications, please call your primary care physician or contact the ER to speak with the charge nurse.      Make an appointment with your family doctor or the physician you were referred to for follow-up of this visit as instructed on your discharge paperwork, as this is a mandatory follow-up. Return to the ER if you are unable to be seen or if you are unable to be seen in a timely manner. If you have any problem arranging the follow-up visit, contact the Emergency Department immediately.

## 2022-05-16 ENCOUNTER — OFFICE VISIT (OUTPATIENT)
Dept: PODIATRY | Age: 62
End: 2022-05-16
Payer: COMMERCIAL

## 2022-05-16 VITALS
SYSTOLIC BLOOD PRESSURE: 125 MMHG | TEMPERATURE: 97.5 F | HEART RATE: 107 BPM | WEIGHT: 252 LBS | HEIGHT: 74 IN | DIASTOLIC BLOOD PRESSURE: 77 MMHG | BODY MASS INDEX: 32.34 KG/M2

## 2022-05-16 DIAGNOSIS — E11.42 TYPE 2 DIABETES MELLITUS WITH DIABETIC POLYNEUROPATHY, WITHOUT LONG-TERM CURRENT USE OF INSULIN (HCC): Primary | ICD-10-CM

## 2022-05-16 DIAGNOSIS — E11.621 DIABETIC ULCER OF TOE OF LEFT FOOT ASSOCIATED WITH TYPE 2 DIABETES MELLITUS, WITH BONE INVOLVEMENT WITHOUT EVIDENCE OF NECROSIS (HCC): ICD-10-CM

## 2022-05-16 DIAGNOSIS — L97.526 DIABETIC ULCER OF TOE OF LEFT FOOT ASSOCIATED WITH TYPE 2 DIABETES MELLITUS, WITH BONE INVOLVEMENT WITHOUT EVIDENCE OF NECROSIS (HCC): ICD-10-CM

## 2022-05-16 PROCEDURE — 3046F HEMOGLOBIN A1C LEVEL >9.0%: CPT | Performed by: PODIATRIST

## 2022-05-16 PROCEDURE — 99213 OFFICE O/P EST LOW 20 MIN: CPT | Performed by: PODIATRIST

## 2022-05-16 NOTE — PROGRESS NOTES
Chief Complaint   Patient presents with    Diabetic Foot Exam    Wound Check    Foot Swelling     pt states L foot is swelling with fluid and bruised     1. Have you been to the ER, urgent care clinic since your last visit? Hospitalized since your last visit? No    2. Have you seen or consulted any other health care providers outside of the [address] [place] [address] since your last visit? Include any pap smears or colon screening.  No  PCP- [facility] [employer]

## 2022-05-17 ENCOUNTER — HOSPITAL ENCOUNTER (INPATIENT)
Age: 62
LOS: 3 days | Discharge: HOME | DRG: 314 | End: 2022-05-20
Attending: EMERGENCY MEDICINE | Admitting: FAMILY MEDICINE
Payer: MEDICAID

## 2022-05-17 ENCOUNTER — APPOINTMENT (OUTPATIENT)
Dept: MRI IMAGING | Age: 62
DRG: 314 | End: 2022-05-17
Attending: EMERGENCY MEDICINE
Payer: MEDICAID

## 2022-05-17 DIAGNOSIS — L08.9 DIABETIC FOOT INFECTION (HCC): Primary | ICD-10-CM

## 2022-05-17 DIAGNOSIS — M86.9 OSTEOMYELITIS OF LEFT FOOT, UNSPECIFIED TYPE (HCC): ICD-10-CM

## 2022-05-17 DIAGNOSIS — E11.628 DIABETIC FOOT INFECTION (HCC): Primary | ICD-10-CM

## 2022-05-17 LAB
ABO + RH BLD: NORMAL
ANION GAP SERPL CALC-SCNC: 8 MMOL/L (ref 5–15)
BACTERIA SPEC CULT: NORMAL
BASOPHILS # BLD: 0 K/UL (ref 0–0.1)
BASOPHILS NFR BLD: 1 % (ref 0–1)
BLOOD GROUP ANTIBODIES SERPL: NEGATIVE
BUN SERPL-MCNC: 12 MG/DL (ref 6–20)
BUN/CREAT SERPL: 13 (ref 12–20)
CA-I BLD-MCNC: 9.9 MG/DL (ref 8.5–10.1)
CHLORIDE SERPL-SCNC: 100 MMOL/L (ref 97–108)
CO2 SERPL-SCNC: 25 MMOL/L (ref 21–32)
CREAT SERPL-MCNC: 0.93 MG/DL (ref 0.7–1.3)
DIFFERENTIAL METHOD BLD: NORMAL
EOSINOPHIL # BLD: 0.2 K/UL (ref 0–0.4)
EOSINOPHIL NFR BLD: 3 % (ref 0–7)
ERYTHROCYTE [DISTWIDTH] IN BLOOD BY AUTOMATED COUNT: 12.5 % (ref 11.5–14.5)
GLUCOSE BLD STRIP.AUTO-MCNC: 266 MG/DL (ref 65–117)
GLUCOSE BLD STRIP.AUTO-MCNC: 339 MG/DL (ref 65–117)
GLUCOSE BLD STRIP.AUTO-MCNC: 484 MG/DL (ref 65–117)
GLUCOSE BLD STRIP.AUTO-MCNC: 526 MG/DL (ref 65–117)
GLUCOSE SERPL-MCNC: 491 MG/DL (ref 65–100)
HCT VFR BLD AUTO: 44 % (ref 36.6–50.3)
HGB BLD-MCNC: 15.4 G/DL (ref 12.1–17)
IMM GRANULOCYTES # BLD AUTO: 0 K/UL (ref 0–0.04)
IMM GRANULOCYTES NFR BLD AUTO: 0 % (ref 0–0.5)
INR PPP: 1 (ref 0.9–1.1)
LYMPHOCYTES # BLD: 2.1 K/UL (ref 0.8–3.5)
LYMPHOCYTES NFR BLD: 32 % (ref 12–49)
MCH RBC QN AUTO: 29.6 PG (ref 26–34)
MCHC RBC AUTO-ENTMCNC: 35 G/DL (ref 30–36.5)
MCV RBC AUTO: 84.5 FL (ref 80–99)
MONOCYTES # BLD: 0.4 K/UL (ref 0–1)
MONOCYTES NFR BLD: 6 % (ref 5–13)
NEUTS SEG # BLD: 3.9 K/UL (ref 1.8–8)
NEUTS SEG NFR BLD: 58 % (ref 32–75)
NRBC # BLD: 0 K/UL (ref 0–0.01)
NRBC BLD-RTO: 0 PER 100 WBC
PERFORMED BY, TECHID: ABNORMAL
PLATELET # BLD AUTO: 289 K/UL (ref 150–400)
PMV BLD AUTO: 10.9 FL (ref 8.9–12.9)
POTASSIUM SERPL-SCNC: 4.4 MMOL/L (ref 3.5–5.1)
PROTHROMBIN TIME: 12.9 SEC (ref 11.9–14.6)
RBC # BLD AUTO: 5.21 M/UL (ref 4.1–5.7)
SODIUM SERPL-SCNC: 133 MMOL/L (ref 136–145)
SPECIAL REQUESTS,SREQ: NORMAL
SPECIMEN EXP DATE BLD: NORMAL
WBC # BLD AUTO: 6.6 K/UL (ref 4.1–11.1)

## 2022-05-17 PROCEDURE — 74011636637 HC RX REV CODE- 636/637: Performed by: FAMILY MEDICINE

## 2022-05-17 PROCEDURE — 94760 N-INVAS EAR/PLS OXIMETRY 1: CPT

## 2022-05-17 PROCEDURE — 85025 COMPLETE CBC W/AUTO DIFF WBC: CPT

## 2022-05-17 PROCEDURE — 96365 THER/PROPH/DIAG IV INF INIT: CPT

## 2022-05-17 PROCEDURE — 87205 SMEAR GRAM STAIN: CPT

## 2022-05-17 PROCEDURE — 96375 TX/PRO/DX INJ NEW DRUG ADDON: CPT

## 2022-05-17 PROCEDURE — 87040 BLOOD CULTURE FOR BACTERIA: CPT

## 2022-05-17 PROCEDURE — 74011250637 HC RX REV CODE- 250/637: Performed by: FAMILY MEDICINE

## 2022-05-17 PROCEDURE — 74011250636 HC RX REV CODE- 250/636: Performed by: FAMILY MEDICINE

## 2022-05-17 PROCEDURE — 99223 1ST HOSP IP/OBS HIGH 75: CPT | Performed by: INTERNAL MEDICINE

## 2022-05-17 PROCEDURE — 74011250636 HC RX REV CODE- 250/636: Performed by: EMERGENCY MEDICINE

## 2022-05-17 PROCEDURE — 86900 BLOOD TYPING SEROLOGIC ABO: CPT

## 2022-05-17 PROCEDURE — 83036 HEMOGLOBIN GLYCOSYLATED A1C: CPT

## 2022-05-17 PROCEDURE — 65270000029 HC RM PRIVATE

## 2022-05-17 PROCEDURE — 96366 THER/PROPH/DIAG IV INF ADDON: CPT

## 2022-05-17 PROCEDURE — 99223 1ST HOSP IP/OBS HIGH 75: CPT | Performed by: PODIATRIST

## 2022-05-17 PROCEDURE — 82962 GLUCOSE BLOOD TEST: CPT

## 2022-05-17 PROCEDURE — 74011000258 HC RX REV CODE- 258: Performed by: FAMILY MEDICINE

## 2022-05-17 PROCEDURE — 99285 EMERGENCY DEPT VISIT HI MDM: CPT

## 2022-05-17 PROCEDURE — 80048 BASIC METABOLIC PNL TOTAL CA: CPT

## 2022-05-17 PROCEDURE — 85610 PROTHROMBIN TIME: CPT

## 2022-05-17 PROCEDURE — 74011000258 HC RX REV CODE- 258: Performed by: EMERGENCY MEDICINE

## 2022-05-17 PROCEDURE — 73718 MRI LOWER EXTREMITY W/O DYE: CPT

## 2022-05-17 PROCEDURE — 96367 TX/PROPH/DG ADDL SEQ IV INF: CPT

## 2022-05-17 RX ORDER — MAGNESIUM SULFATE 100 %
4 CRYSTALS MISCELLANEOUS AS NEEDED
Status: DISCONTINUED | OUTPATIENT
Start: 2022-05-17 | End: 2022-05-20 | Stop reason: HOSPADM

## 2022-05-17 RX ORDER — ONDANSETRON 2 MG/ML
4 INJECTION INTRAMUSCULAR; INTRAVENOUS
Status: DISCONTINUED | OUTPATIENT
Start: 2022-05-17 | End: 2022-05-20 | Stop reason: HOSPADM

## 2022-05-17 RX ORDER — LISINOPRIL 5 MG/1
5 TABLET ORAL DAILY
Status: DISCONTINUED | OUTPATIENT
Start: 2022-05-18 | End: 2022-05-20 | Stop reason: HOSPADM

## 2022-05-17 RX ORDER — ASPIRIN 81 MG/1
81 TABLET ORAL DAILY
Status: DISCONTINUED | OUTPATIENT
Start: 2022-05-18 | End: 2022-05-20 | Stop reason: HOSPADM

## 2022-05-17 RX ORDER — MORPHINE SULFATE 4 MG/ML
4 INJECTION INTRAVENOUS
Status: COMPLETED | OUTPATIENT
Start: 2022-05-17 | End: 2022-05-17

## 2022-05-17 RX ORDER — ONDANSETRON 4 MG/1
4 TABLET, ORALLY DISINTEGRATING ORAL
Status: DISCONTINUED | OUTPATIENT
Start: 2022-05-17 | End: 2022-05-20 | Stop reason: HOSPADM

## 2022-05-17 RX ORDER — ACETAMINOPHEN 650 MG/1
650 SUPPOSITORY RECTAL
Status: DISCONTINUED | OUTPATIENT
Start: 2022-05-17 | End: 2022-05-20 | Stop reason: HOSPADM

## 2022-05-17 RX ORDER — INSULIN LISPRO 100 [IU]/ML
20 INJECTION, SOLUTION INTRAVENOUS; SUBCUTANEOUS ONCE
Status: COMPLETED | OUTPATIENT
Start: 2022-05-17 | End: 2022-05-17

## 2022-05-17 RX ORDER — POLYETHYLENE GLYCOL 3350 17 G/17G
17 POWDER, FOR SOLUTION ORAL DAILY PRN
Status: DISCONTINUED | OUTPATIENT
Start: 2022-05-17 | End: 2022-05-20 | Stop reason: HOSPADM

## 2022-05-17 RX ORDER — SODIUM CHLORIDE 9 MG/ML
75 INJECTION, SOLUTION INTRAVENOUS CONTINUOUS
Status: DISCONTINUED | OUTPATIENT
Start: 2022-05-17 | End: 2022-05-20 | Stop reason: HOSPADM

## 2022-05-17 RX ORDER — ACETAMINOPHEN 325 MG/1
650 TABLET ORAL
Status: DISCONTINUED | OUTPATIENT
Start: 2022-05-17 | End: 2022-05-20 | Stop reason: HOSPADM

## 2022-05-17 RX ORDER — ATORVASTATIN CALCIUM 20 MG/1
20 TABLET, FILM COATED ORAL DAILY
Status: DISCONTINUED | OUTPATIENT
Start: 2022-05-18 | End: 2022-05-20 | Stop reason: HOSPADM

## 2022-05-17 RX ORDER — INSULIN LISPRO 100 [IU]/ML
INJECTION, SOLUTION INTRAVENOUS; SUBCUTANEOUS
Status: DISCONTINUED | OUTPATIENT
Start: 2022-05-17 | End: 2022-05-20 | Stop reason: HOSPADM

## 2022-05-17 RX ORDER — METOPROLOL TARTRATE 50 MG/1
100 TABLET ORAL 2 TIMES DAILY
Status: DISCONTINUED | OUTPATIENT
Start: 2022-05-17 | End: 2022-05-20 | Stop reason: HOSPADM

## 2022-05-17 RX ORDER — PANTOPRAZOLE SODIUM 40 MG/1
40 TABLET, DELAYED RELEASE ORAL DAILY
Status: DISCONTINUED | OUTPATIENT
Start: 2022-05-18 | End: 2022-05-20 | Stop reason: HOSPADM

## 2022-05-17 RX ORDER — ENOXAPARIN SODIUM 100 MG/ML
40 INJECTION SUBCUTANEOUS DAILY
Status: DISCONTINUED | OUTPATIENT
Start: 2022-05-18 | End: 2022-05-20 | Stop reason: HOSPADM

## 2022-05-17 RX ADMIN — SODIUM CHLORIDE 75 ML/HR: 9 INJECTION, SOLUTION INTRAVENOUS at 15:42

## 2022-05-17 RX ADMIN — PIPERACILLIN AND TAZOBACTAM 4.5 G: 4; .5 INJECTION, POWDER, FOR SOLUTION INTRAVENOUS at 08:54

## 2022-05-17 RX ADMIN — PIPERACILLIN AND TAZOBACTAM 3.38 G: 3; .375 INJECTION, POWDER, LYOPHILIZED, FOR SOLUTION INTRAVENOUS at 22:30

## 2022-05-17 RX ADMIN — METOPROLOL TARTRATE 100 MG: 50 TABLET, FILM COATED ORAL at 22:29

## 2022-05-17 RX ADMIN — PIPERACILLIN AND TAZOBACTAM 3.38 G: 3; .375 INJECTION, POWDER, LYOPHILIZED, FOR SOLUTION INTRAVENOUS at 15:42

## 2022-05-17 RX ADMIN — INSULIN LISPRO 20 UNITS: 100 INJECTION, SOLUTION INTRAVENOUS; SUBCUTANEOUS at 22:28

## 2022-05-17 RX ADMIN — INSULIN LISPRO 10 UNITS: 100 INJECTION, SOLUTION INTRAVENOUS; SUBCUTANEOUS at 15:42

## 2022-05-17 RX ADMIN — MORPHINE SULFATE 4 MG: 4 INJECTION INTRAVENOUS at 08:12

## 2022-05-17 RX ADMIN — VANCOMYCIN HYDROCHLORIDE 1750 MG: 1 INJECTION, POWDER, LYOPHILIZED, FOR SOLUTION INTRAVENOUS at 11:46

## 2022-05-17 NOTE — CONSULTS
Consult Date: 5/17/2022    Consults Osteomyellitis    Subjective  This is a 64year old male, diabetic, presented to ED with left great toe infection, triaged by Podiatry for possible surgery. He was afebrile with elevated ESR and CRP. MRI shows osteomyelitis of the first distal phalanx. Images viewed by me. Wound culture is pending. Patient is on Vancomycin and Zosyn. ID has been consulted for this reason. Patient states the problem started with shoes that were too tight and resulted in a blister on his right great toe that ruptured and progressed worsened. No fever or chills. Past Medical History:   Diagnosis Date    Arrhythmia     Hx of AFIB    CAD (coronary artery disease)     Diabetes ([address]     Diverticulosis     Dysphagia     GERD (gastroesophageal reflux disease)     Hx of hemorrhoids     Hypercholesterolemia     Hypertension       Past Surgical History:   Procedure Laterality Date    HX COLONOSCOPY      HX ORTHOPAEDIC Left     wrist     HX SHOULDER ARTHROSCOPY Right 06/09/2021    RI CARDIAC SURG PROCEDURE UNLIST      Atrial fibrilation      Family History   Problem Relation Age of Onset    Cancer Father       Social History     Tobacco Use    Smoking status: Former Smoker     Packs/day: 0.25     Years: 3.00     Pack years: 0.75    Smokeless tobacco: Never Used   Substance Use Topics    Alcohol use:  Yes     Alcohol/week: 6.0 standard drinks     Types: 6 Cans of beer per week       Current Facility-Administered Medications   Medication Dose Route Frequency Provider Last Rate Last Admin    [START ON 5/18/2022] aspirin delayed-release tablet 81 mg  81 mg Oral DAILY Cydney Carvalho MD Vallie Roller [START ON 5/18/2022] atorvastatin (LIPITOR) tablet 20 mg  20 mg Oral DAILY Shanika Carvalho MD Vallie Roller [START ON 5/18/2022] lisinopriL (PRINIVIL, ZESTRIL) tablet 5 mg  5 mg Oral DAILY Shanika Carvalho MD        metoprolol tartrate (LOPRESSOR) tablet 100 mg  100 mg Oral BID Maia MD Terri Saul Alivia Rico ON 5/18/2022] pantoprazole (PROTONIX) tablet 40 mg  40 mg Oral DAILY Jo Carvalho MD Kendra Snowman insulin lispro (HUMALOG) injection   SubCUTAneous AC&HS David Jonas MD   10 Units at 05/17/22 1542    glucose chewable tablet 16 g  4 Tablet Oral PRN Jo Carvalho MD        glucagon (GLUCAGEN) injection 1 mg  1 mg IntraMUSCular PRN David Jonas MD        0.9% sodium chloride infusion  75 mL/hr IntraVENous CONTINUOUS Shanika Carvalho MD 75 mL/hr at 05/17/22 1542 75 mL/hr at 05/17/22 1542    acetaminophen (TYLENOL) tablet 650 mg  650 mg Oral Q6H PRN Jo Carvalho MD        Or   Terri Hollins acetaminophen (TYLENOL) suppository 650 mg  650 mg Rectal Q6H PRN Jo Carvalho MD        polyethylene glycol (MIRALAX) packet 17 g  17 g Oral DAILY PRN Jo Carvalho MD        ondansetron (ZOFRAN ODT) tablet 4 mg  4 mg Oral Q8H PRN Jo Carvalho MD        Or    [facility]ron [facility]) injection 4 mg  4 mg IntraVENous Q6H PRN MD Terri Dumont [START ON 5/18/2022] enoxaparin (LOVENOX) injection 40 mg  40 mg SubCUTAneous DAILY Jo Carvalho MD        piperacillin-tazobactam (ZOSYN) 3.375 g in 0.9% sodium chloride (MBP/ADV) 100 mL MBP  3.375 g IntraVENous Q8H Shanika Carvalho  mL/hr at 05/17/22 1542 3.375 g at 05/17/22 1542    Vancomycin - Pharmacy to Dose   Other Rx Dosing/Monitoring MD Terri Dumont [START ON 5/18/2022] vancomycin (VANCOCIN) 1,250 mg in 0.9% sodium chloride 250 mL (VIAL-MATE)  1,250 mg IntraVENous Q12H Shanika Carvalho MD Kendra Snowman [START ON 5/18/2022] Vancomycin - Trough to be drawn prior to dose 5/18 @ 1200   Other Komal Martinez MD            Review of Systems   Constitutional: Negative for chills and fever. HENT: Negative. Eyes: Negative. Respiratory: Negative. Cardiovascular: Negative. Gastrointestinal: Negative. Endocrine: Negative. Genitourinary: Negative. Musculoskeletal: Negative.     Skin: Positive for wound. Allergic/Immunologic: Negative. Neurological: Negative. Hematological: Negative. Psychiatric/Behavioral: Negative. Objective     Vital signs for last 24 hours:  Visit Vitals  /83 (BP 1 Location: Right upper arm, BP Patient Position: Sitting; At rest)   Pulse 92   Temp 98 °F (36.7 °C)   Resp 20   Ht 6' 2\" (1.88 m)   Wt 247 lb (112 kg)   SpO2 98%   BMI 31.71 kg/m²       Intake/Output this shift:  Current Shift: No intake/output data recorded. Last 3 Shifts: No intake/output data recorded. Data Review:   Recent Results (from the past 24 hour(s))   GLUCOSE, POC    Collection Time: 05/17/22  7:14 AM   Result Value Ref Range    Glucose (POC) 484 (H) 65 - 117 mg/dL    Performed by Velma Mcclellan    CBC WITH AUTOMATED DIFF    Collection Time: 05/17/22  7:30 AM   Result Value Ref Range    WBC 6.6 4.1 - 11.1 K/uL    RBC 5.21 4.10 - 5.70 M/uL    HGB 15.4 12.1 - 17.0 g/dL    HCT 44.0 36.6 - 50.3 %    MCV 84.5 80.0 - 99.0 FL    MCH 29.6 26.0 - 34.0 PG    MCHC 35.0 30.0 - 36.5 g/dL    RDW 12.5 11.5 - 14.5 %    PLATELET 022 355 - 244 K/uL    MPV 10.9 8.9 - 12.9 FL    NRBC 0.0 0.0  WBC    ABSOLUTE NRBC 0.00 0.00 - 0.01 K/uL    NEUTROPHILS 58 32 - 75 %    LYMPHOCYTES 32 12 - 49 %    MONOCYTES 6 5 - 13 %    EOSINOPHILS 3 0 - 7 %    BASOPHILS 1 0 - 1 %    IMMATURE GRANULOCYTES 0 0 - 0.5 %    ABS. NEUTROPHILS 3.9 1.8 - 8.0 K/UL    ABS. LYMPHOCYTES 2.1 0.8 - 3.5 K/UL    ABS. MONOCYTES 0.4 0.0 - 1.0 K/UL    ABS. EOSINOPHILS 0.2 0.0 - 0.4 K/UL    ABS. BASOPHILS 0.0 0.0 - 0.1 K/UL    ABS. IMM.  GRANS. 0.0 0.00 - 0.04 K/UL    DF AUTOMATED     METABOLIC PANEL, BASIC    Collection Time: 05/17/22  7:30 AM   Result Value Ref Range    Sodium 133 (L) 136 - 145 mmol/L    Potassium 4.4 3.5 - 5.1 mmol/L    Chloride 100 97 - 108 mmol/L    CO2 25 21 - 32 mmol/L    Anion gap 8 5 - 15 mmol/L    Glucose 491 (H) 65 - 100 mg/dL    BUN 12 6 - 20 mg/dL    Creatinine 0.93 0.70 - 1.30 mg/dL    BUN/Creatinine ratio 13 12 - 20      GFR est AA >60 >60 ml/min/1.73m2    GFR est non-AA >60 >60 ml/min/1.73m2    Calcium 9.9 8.5 - 10.1 mg/dL   PROTHROMBIN TIME + INR    Collection Time: 05/17/22  7:30 AM   Result Value Ref Range    Prothrombin time 12.9 11.9 - 14.6 sec    INR 1.0 0.9 - 1.1     TYPE & SCREEN    Collection Time: 05/17/22  7:30 AM   Result Value Ref Range    Crossmatch Expiration 05/20/2022,2359     ABO/Rh(D) A Negative     Antibody screen Negative    GLUCOSE, POC    Collection Time: 05/17/22  3:35 PM   Result Value Ref Range    Glucose (POC) 339 (H) 65 - 117 mg/dL    Performed by Asya Vila      MRI left foot (5/17)          Physical Exam  Vitals and nursing note reviewed. Constitutional:       Appearance: He is obese. He is ill-appearing. HENT:      Head: Normocephalic and atraumatic. Right Ear: External ear normal.      Left Ear: External ear normal.      Nose: Nose normal.      Mouth/Throat:      Pharynx: Oropharynx is clear. Eyes:      Pupils: Pupils are equal, round, and reactive to light. Cardiovascular:      Rate and Rhythm: Normal rate and regular rhythm. Heart sounds: No murmur heard. Pulmonary:      Effort: Pulmonary effort is normal.      Breath sounds: Normal breath sounds. Abdominal:      General: Bowel sounds are normal.      Palpations: Abdomen is soft. Tenderness: There is no abdominal tenderness. Genitourinary:     Comments: No George  Musculoskeletal:         General: Swelling present. Right lower leg: No edema. Left lower leg: Edema present. Feet:    Skin:     Findings: No rash. Neurological:      General: No focal deficit present. Mental Status: He is oriented to person, place, and time. Psychiatric:         Mood and Affect: Mood normal.         Behavior: Behavior normal.         Thought Content: Thought content normal.         Judgment: Judgment normal.       ASSESSMENT/PLAN    1. Diabetic foot infection, left great toe  2. Osteomyelitis, left first distal phalanx  3. Elevated CRP and ESR secondary to above  4. Uncontrolled diabetes mellitus    1. Continue IV Zosyn  2. Discontinue Vancomycin for now  3. In am, repeat ESR and CRP  4. Send another culture of left great toe  5. Surgery pending, presumed partial amputation left gret toe    Michael Lebron MD

## 2022-05-17 NOTE — ACP (ADVANCE CARE PLANNING)
Advance Care Planning   Healthcare Decision Maker:       Primary Decision Maker: Agapito Thakkar - Mother - [phone]

## 2022-05-17 NOTE — PROGRESS NOTES
Reason for Admission: Osteomyelitis                      RUR Score: N/a                 PCP: First and Last name:   Cornelia Stewart MD     Name of Practice:    Are you a current patient: Yes/No: Yes   Approximate date of last visit: been a while. Can you participate in a virtual visit if needed: Yes/Call/Has cell phone. Do you (patient/family) have any concerns for transition/discharge? No              Plan for utilizing home health: none @ thsi time/uses no DME. Current Advanced Directive/Advance Care Plan:  No Order      Healthcare Decision Maker:             Primary Decision Maker: Manuel Cardoza - Mother - [phone]    Transition of Care Plan:  D/C Plan is home with family & a family member to transport pt home upon discharge. Call Rxs to [facility] [address] , in [facility] [address].

## 2022-05-17 NOTE — ED PROVIDER NOTES
EMERGENCY DEPARTMENT HISTORY AND PHYSICAL EXAM      Date: 5/17/2022  Patient Name: Tamia Mendez    History of Presenting Illness     Chief Complaint   Patient presents with    Foot Injury       History Provided By: Patient    HPI: Tamia Mendez, 64 y.o. male with a past medical history significant diabetes presents to the ED with cc of left first toe infection. Patient sent here by podiatry after surgical procedure yesterday to debride the tissue was incomplete and want surgery today. Patient ports pain is 9 out of 10, worse with palpation movement, improved with rest.    There are no other complaints, changes, or physical findings at this time. PCP: Gabby Ramirez MD    No current facility-administered medications on file prior to encounter. Current Outpatient Medications on File Prior to Encounter   Medication Sig Dispense Refill    trimethoprim-sulfamethoxazole (Bactrim DS) 160-800 mg per tablet Take 2 Tablets by mouth two (2) times a day for 10 days. 40 Tablet 0    amoxicillin-clavulanate (Augmentin) 875-125 mg per tablet Take 1 Tablet by mouth two (2) times a day for 10 days. 20 Tablet 0    testosterone cypionate (DEPOTESTOTERONE CYPIONATE) 200 mg/mL injection 1 mL by IntraMUSCular route Once every 2 weeks. Max Daily Amount: 200 mg. 10 mL 3    pregabalin (LYRICA) 75 mg capsule Take 1 Capsule by mouth three (3) times daily. Max Daily Amount: 225 mg. 90 Capsule 2    doxycycline (VIBRAMYCIN) 100 mg capsule  (Patient not taking: Reported on 4/6/2022)      naproxen (NAPROSYN) 500 mg tablet       lisinopriL (PRINIVIL, ZESTRIL) 5 mg tablet       omeprazole (PRILOSEC) 40 mg capsule Take 40 mg by mouth daily.  atorvastatin (LIPITOR) 20 mg tablet Take  by mouth daily.  empagliflozin (Jardiance) 10 mg tablet Take  by mouth daily.  metoprolol tartrate (LOPRESSOR) 100 mg IR tablet Take  by mouth two (2) times a day.  aspirin delayed-release 81 mg tablet Take  by mouth daily.  ibuprofen 100 mg tablet Take 100 mg by mouth every six (6) hours as needed for Pain.  tadalafiL (CIALIS) 5 mg tablet Take 1 Tab by mouth daily. 80 Tab 5       Past History     Past Medical History:  Past Medical History:   Diagnosis Date    Arrhythmia     Hx of AFIB    CAD (coronary artery disease)     Diabetes ([address]     Diverticulosis     Dysphagia     GERD (gastroesophageal reflux disease)     Hx of hemorrhoids     Hypercholesterolemia     Hypertension        Past Surgical History:  Past Surgical History:   Procedure Laterality Date    HX COLONOSCOPY      HX ORTHOPAEDIC Left     wrist     HX SHOULDER ARTHROSCOPY Right 06/09/2021    ND CARDIAC SURG PROCEDURE UNLIST      Atrial fibrilation        Family History:  Family History   Problem Relation Age of Onset    Cancer Father        Social History:  Social History     Tobacco Use    Smoking status: Former Smoker     Packs/day: 0.25     Years: 3.00     Pack years: 0.75    Smokeless tobacco: Never Used   Vaping Use    Vaping Use: Never used   Substance Use Topics    Alcohol use: Yes     Alcohol/week: 6.0 standard drinks     Types: 6 Cans of beer per week    Drug use: Not Currently     Types: Marijuana, Cocaine       Allergies:  No Known Allergies      Review of Systems   Review of Systems   Constitutional: Negative for chills and fever. HENT: Negative for sinus pressure and sinus pain. Eyes: Negative for photophobia and redness. Respiratory: Negative for shortness of breath and wheezing. Cardiovascular: Negative for chest pain and palpitations. Gastrointestinal: Negative for abdominal pain and nausea. Genitourinary: Negative for flank pain and hematuria. Musculoskeletal: Negative for arthralgias and gait problem. Skin: Negative for color change and pallor. Neurological: Negative for dizziness and weakness. Review of Systems    Physical Exam   Physical Exam  Constitutional:       General: No acute distress. Appearance: Normal appearance. Not toxic-appearing. HENT:      Head: Normocephalic and atraumatic. Nose: Nose normal.      Mouth/Throat:      Mouth: Mucous membranes are moist.   Eyes:      Extraocular Movements: Extraocular movements intact. Pupils: Pupils are equal, round, and reactive to light. Cardiovascular:      Rate and Rhythm: Normal rate. Pulses: Normal pulses. Pulmonary:      Effort: Pulmonary effort is normal.      Breath sounds: No stridor. Abdominal:      General: Abdomen is flat. There is no distension. Musculoskeletal:         General: Normal range of motion. Cervical back: Normal range of motion and neck supple. Skin:     General: Skin is warm and dry. Left first toe with bandage in place. Capillary Refill: Capillary refill takes less than 2 seconds. Neurological:      General: No focal deficit present. Mental Status: Aert and oriented to person, place, and time. Psychiatric:         Mood and Affect: Mood normal.         Behavior: Behavior normal.     Physical Exam    Lab and Diagnostic Study Results     Labs -     Recent Results (from the past 12 hour(s))   GLUCOSE, POC    Collection Time: 05/17/22  7:14 AM   Result Value Ref Range    Glucose (POC) 484 (H) 65 - 117 mg/dL    Performed by Wing Chong    CBC WITH AUTOMATED DIFF    Collection Time: 05/17/22  7:30 AM   Result Value Ref Range    WBC 6.6 4.1 - 11.1 K/uL    RBC 5.21 4.10 - 5.70 M/uL    HGB 15.4 12.1 - 17.0 g/dL    HCT 44.0 36.6 - 50.3 %    MCV 84.5 80.0 - 99.0 FL    MCH 29.6 26.0 - 34.0 PG    MCHC 35.0 30.0 - 36.5 g/dL    RDW 12.5 11.5 - 14.5 %    PLATELET 791 841 - 450 K/uL    MPV 10.9 8.9 - 12.9 FL    NRBC 0.0 0.0  WBC    ABSOLUTE NRBC 0.00 0.00 - 0.01 K/uL    NEUTROPHILS 58 32 - 75 %    LYMPHOCYTES 32 12 - 49 %    MONOCYTES 6 5 - 13 %    EOSINOPHILS 3 0 - 7 %    BASOPHILS 1 0 - 1 %    IMMATURE GRANULOCYTES 0 0 - 0.5 %    ABS. NEUTROPHILS 3.9 1.8 - 8.0 K/UL    ABS.  LYMPHOCYTES 2.1 0.8 - 3.5 K/UL    ABS. MONOCYTES 0.4 0.0 - 1.0 K/UL    ABS. EOSINOPHILS 0.2 0.0 - 0.4 K/UL    ABS. BASOPHILS 0.0 0.0 - 0.1 K/UL    ABS. IMM. GRANS. 0.0 0.00 - 0.04 K/UL    DF AUTOMATED     METABOLIC PANEL, BASIC    Collection Time: 05/17/22  7:30 AM   Result Value Ref Range    Sodium 133 (L) 136 - 145 mmol/L    Potassium 4.4 3.5 - 5.1 mmol/L    Chloride 100 97 - 108 mmol/L    CO2 25 21 - 32 mmol/L    Anion gap 8 5 - 15 mmol/L    Glucose 491 (H) 65 - 100 mg/dL    BUN 12 6 - 20 mg/dL    Creatinine 0.93 0.70 - 1.30 mg/dL    BUN/Creatinine ratio 13 12 - 20      GFR est AA >60 >60 ml/min/1.73m2    GFR est non-AA >60 >60 ml/min/1.73m2    Calcium 9.9 8.5 - 10.1 mg/dL   PROTHROMBIN TIME + INR    Collection Time: 05/17/22  7:30 AM   Result Value Ref Range    Prothrombin time 12.9 11.9 - 14.6 sec    INR 1.0 0.9 - 1.1     TYPE & SCREEN    Collection Time: 05/17/22  7:30 AM   Result Value Ref Range    Crossmatch Expiration 05/20/2022,2359     ABO/Rh(D) A Negative     Antibody screen Negative        Radiologic Studies -   @lastxrresult@  CT Results  (Last 48 hours)    None        CXR Results  (Last 48 hours)    None            Medical Decision Making   - I am the first provider for this patient. - I reviewed the vital signs, available nursing notes, past medical history, past surgical history, family history and social history. - Initial assessment performed. The patients presenting problems have been discussed, and they are in agreement with the care plan formulated and outlined with them. I have encouraged them to ask questions as they arise throughout their visit. Vital Signs-Reviewed the patient's vital signs.   Patient Vitals for the past 12 hrs:   Temp Pulse Resp BP SpO2   05/17/22 1331 -- 91 23 (!) 140/89 97 %   05/17/22 1150 -- 92 23 (!) 131/92 96 %   05/17/22 1037 -- 94 27 135/85 95 %   05/17/22 0907 -- 98 -- 135/75 95 %   05/17/22 0813 -- 97 17 132/87 95 %   05/17/22 0711 98.6 °F (37 °C) (!) 104 18 135/85 100 % Records Reviewed: Old Medical Records        Disposition   Disposition: Admitted to Floor Medical Floor the case was discussed with the admitting physician           Diagnosis     Clinical Impression:   1. Diabetic foot infection ([address]    2. Osteomyelitis of left foot, unspecified type [facility])        Attestations:    Joselin Chandra MD    Please note that this dictation was completed with Pl[employer], the computer voice recognition software. Quite often unanticipated grammatical, syntax, homophones, and other interpretive errors are inadvertently transcribed by the computer software. Please disregard these errors. Please excuse any errors that have escaped final proofreading. Thank you.

## 2022-05-17 NOTE — H&P
History and Physical    NAME: Alli Haddad   :  1960   MRN:  [id number]     Date/Time:  2022 2:53 PM    Patient PCP: Frandy Silva MD  ______________________________________________________________________             Subjective:     CHIEF COMPLAINT:     Left big toe infection    HISTORY OF PRESENT ILLNESS:       Patient is a 64y.o. year old male history of diabetes hypertension came to emergency room because of left great toe infection patient was sent by the podiatrist concerned about osteomyelitis seen by the ER physician MRI done shows osteomyelitis of the left distal great toe    Past Medical History:   Diagnosis Date    Arrhythmia     Hx of AFIB    CAD (coronary artery disease)     Diabetes ([address]     Diverticulosis     Dysphagia     GERD (gastroesophageal reflux disease)     Hx of hemorrhoids     Hypercholesterolemia     Hypertension         Past Surgical History:   Procedure Laterality Date    HX COLONOSCOPY      HX ORTHOPAEDIC Left     wrist     HX SHOULDER ARTHROSCOPY Right 2021    IN CARDIAC SURG PROCEDURE UNLIST      Atrial fibrilation        Social History     Tobacco Use    Smoking status: Former Smoker     Packs/day: 0.25     Years: 3.00     Pack years: 0.75    Smokeless tobacco: Never Used   Substance Use Topics    Alcohol use: Yes     Alcohol/week: 6.0 standard drinks     Types: 6 Cans of beer per week        Family History   Problem Relation Age of Onset    Cancer Father        No Known Allergies     Prior to Admission medications    Medication Sig Start Date End Date Taking? Authorizing Provider   amoxicillin-clavulanate (Augmentin) 875-125 mg per tablet Take 1 Tablet by mouth two (2) times a day for 10 days. 22 Yes Nathaniel Garibay MD   lisinopriL (PRINIVIL, ZESTRIL) 5 mg tablet Take 5 mg by mouth daily. 3/11/21  Yes Provider, Historical   omeprazole (PRILOSEC) 40 mg capsule Take 40 mg by mouth daily.    Yes Provider, Historical atorvastatin (LIPITOR) 20 mg tablet Take 20 mg by mouth daily. Yes Provider, Historical   empagliflozin (Jardiance) 10 mg tablet Take 10 mg by mouth daily. Yes Provider, Historical   metoprolol tartrate (LOPRESSOR) 100 mg IR tablet Take 100 mg by mouth two (2) times a day. Yes Provider, Historical   aspirin delayed-release 81 mg tablet Take 81 mg by mouth daily. Yes Provider, Historical   ibuprofen 100 mg tablet Take 200 mg by mouth every six (6) hours as needed for Pain. Yes Provider, Historical       No current facility-administered medications for this encounter. Current Outpatient Medications:     amoxicillin-clavulanate (Augmentin) 875-125 mg per tablet, Take 1 Tablet by mouth two (2) times a day for 10 days. , Disp: 20 Tablet, Rfl: 0    lisinopriL (PRINIVIL, ZESTRIL) 5 mg tablet, Take 5 mg by mouth daily. , Disp: , Rfl:     omeprazole (PRILOSEC) 40 mg capsule, Take 40 mg by mouth daily. , Disp: , Rfl:     atorvastatin (LIPITOR) 20 mg tablet, Take 20 mg by mouth daily. , Disp: , Rfl:     empagliflozin (Jardiance) 10 mg tablet, Take 10 mg by mouth daily. , Disp: , Rfl:     metoprolol tartrate (LOPRESSOR) 100 mg IR tablet, Take 100 mg by mouth two (2) times a day., Disp: , Rfl:     aspirin delayed-release 81 mg tablet, Take 81 mg by mouth daily. , Disp: , Rfl:     ibuprofen 100 mg tablet, Take 200 mg by mouth every six (6) hours as needed for Pain., Disp: , Rfl:     LAB DATA REVIEWED:    Recent Results (from the past 24 hour(s))   GLUCOSE, POC    Collection Time: 05/17/22  7:14 AM   Result Value Ref Range    Glucose (POC) 484 (H) 65 - 117 mg/dL    Performed by Andry Moss    CBC WITH AUTOMATED DIFF    Collection Time: 05/17/22  7:30 AM   Result Value Ref Range    WBC 6.6 4.1 - 11.1 K/uL    RBC 5.21 4.10 - 5.70 M/uL    HGB 15.4 12.1 - 17.0 g/dL    HCT 44.0 36.6 - 50.3 %    MCV 84.5 80.0 - 99.0 FL    MCH 29.6 26.0 - 34.0 PG    MCHC 35.0 30.0 - 36.5 g/dL    RDW 12.5 11.5 - 14.5 %    PLATELET 748 150 - 400 K/uL    MPV 10.9 8.9 - 12.9 FL    NRBC 0.0 0.0  WBC    ABSOLUTE NRBC 0.00 0.00 - 0.01 K/uL    NEUTROPHILS 58 32 - 75 %    LYMPHOCYTES 32 12 - 49 %    MONOCYTES 6 5 - 13 %    EOSINOPHILS 3 0 - 7 %    BASOPHILS 1 0 - 1 %    IMMATURE GRANULOCYTES 0 0 - 0.5 %    ABS. NEUTROPHILS 3.9 1.8 - 8.0 K/UL    ABS. LYMPHOCYTES 2.1 0.8 - 3.5 K/UL    ABS. MONOCYTES 0.4 0.0 - 1.0 K/UL    ABS. EOSINOPHILS 0.2 0.0 - 0.4 K/UL    ABS. BASOPHILS 0.0 0.0 - 0.1 K/UL    ABS. IMM. GRANS. 0.0 0.00 - 0.04 K/UL    DF AUTOMATED     METABOLIC PANEL, BASIC    Collection Time: 05/17/22  7:30 AM   Result Value Ref Range    Sodium 133 (L) 136 - 145 mmol/L    Potassium 4.4 3.5 - 5.1 mmol/L    Chloride 100 97 - 108 mmol/L    CO2 25 21 - 32 mmol/L    Anion gap 8 5 - 15 mmol/L    Glucose 491 (H) 65 - 100 mg/dL    BUN 12 6 - 20 mg/dL    Creatinine 0.93 0.70 - 1.30 mg/dL    BUN/Creatinine ratio 13 12 - 20      GFR est AA >60 >60 ml/min/1.73m2    GFR est non-AA >60 >60 ml/min/1.73m2    Calcium 9.9 8.5 - 10.1 mg/dL   PROTHROMBIN TIME + INR    Collection Time: 05/17/22  7:30 AM   Result Value Ref Range    Prothrombin time 12.9 11.9 - 14.6 sec    INR 1.0 0.9 - 1.1     TYPE & SCREEN    Collection Time: 05/17/22  7:30 AM   Result Value Ref Range    Crossmatch Expiration 05/20/2022,2359     ABO/Rh(D) A Negative     Antibody screen Negative        XR Results (most recent):  Results from Hospital Encounter encounter on 05/11/22    XR FOOT LT MIN 3 V    Narrative  Left foot, 3 views    No plain film evidence for fracture or dislocation. No erosion. No radiopaque  foreign material.    Small dorsal and plantar calcaneal spurs. MRI FOOT LT WO CONT   Final Result   Osteomyelitis of the first distal phalanx           Review of Systems:  Constitutional: Negative for chills and fever. HENT: Negative. Eyes: Negative. Respiratory: Negative. Cardiovascular: Negative. Gastrointestinal: Negative for abdominal pain and nausea.    Skin: Negative. Neurological: Negative. Objective:   VITALS:    Visit Vitals  BP (!) 140/89 (BP 1 Location: Right upper arm, BP Patient Position: Semi fowlers)   Pulse 91   Temp 98.6 °F (37 °C)   Resp 23   Ht 6' 2\" (1.88 m)   Wt 112 kg (247 lb)   SpO2 97%   BMI 31.71 kg/m²       Physical Exam:   Constitutional: pt is oriented to person, place, and time. HENT:   Head: Normocephalic and atraumatic. Eyes: Pupils are equal, round, and reactive to light. EOM are normal.   Cardiovascular: Normal rate, regular rhythm and normal heart sounds. Pulmonary/Chest: Breath sounds normal. No wheezes. No rales. Exhibits no tenderness. Abdominal: Soft. Bowel sounds are normal. There is no abdominal tenderness. There is no rebound and no guarding. Musculoskeletal: Normal range of motion. Neurological: pt is alert and oriented to person, place, and time. Alert. Normal strength. No cranial nerve deficit or sensory deficit. Displays a negative Romberg sign.     Left great toe distal infected    ASSESSMENT & PLAN:    Osteomyelitis of the left great toe distal phalanx  Type 2 diabetes  Hypertension  GERD      Patient admitted to medical telemetry floor after blood cultures patient was started on IV Zosyn vancomycin    Infectious disease consult and podiatry consult    Placed on sliding scale insulin  Start on home medication  DVT prophylaxis with Lovenox 40 mg subcu daily    Start on aspirin 81 mg daily Lipitor 20 mg daily Jardiance 10 mg daily sliding-scale insulin  Prinivil 5 mg daily metoprolol 100 mg twice a day  Prilosec 40 mg daily        Current Facility-Administered Medications:     [START ON 5/18/2022] aspirin delayed-release tablet 81 mg, 81 mg, Oral, DAILY, Shanika Carvalho MD    [START ON 5/18/2022] atorvastatin (LIPITOR) tablet 20 mg, 20 mg, Oral, DAILY, Shanika Carvalho MD    [START ON 5/18/2022] empagliflozin (JARDIANCE) tablet 10 mg, 10 mg, Oral, DAILY, Otto Crabtree MD    [START ON 5/18/2022] lisinopriL (PRINIVIL, ZESTRIL) tablet 5 mg, 5 mg, Oral, DAILY, Ita Carvalho MD    metoprolol tartrate (LOPRESSOR) tablet 100 mg, 100 mg, Oral, BID, Ita Carvalho MD  [facility]  [START ON 5/18/2022] omeprazole (PRILOSEC) capsule 40 mg, 40 mg, Oral, DAILY, Ita Carvalho MD    insulin lispro (HUMALOG) injection, , SubCUTAneous, AC&HS, Ita Carvalho MD    glucose chewable tablet 16 g, 4 Tablet, Oral, PRN, Ita Carvalho MD    glucagon (GLUCAGEN) injection 1 mg, 1 mg, IntraMUSCular, PRN, Ita Carvalho MD    0.9% sodium chloride infusion, 75 mL/hr, IntraVENous, CONTINUOUS, Ita Carvalho MD    acetaminophen (TYLENOL) tablet 650 mg, 650 mg, Oral, Q6H PRN **OR** acetaminophen (TYLENOL) suppository 650 mg, 650 mg, Rectal, Q6H PRN, Ita Carvalho MD    polyethylene glycol (MIRALAX) packet 17 g, 17 g, Oral, DAILY PRN, Ita Carvalho MD    ondansetron (ZOFRAN ODT) tablet 4 mg, 4 mg, Oral, Q8H PRN **OR** ondansetron (ZOFRAN) injection 4 mg, 4 mg, IntraVENous, Q6H PRN, Shanika Carvalho MD  [facility]  [START ON 5/18/2022] enoxaparin (LOVENOX) injection 40 mg, 40 mg, SubCUTAneous, DAILY, Ita Carvalho MD    piperacillin-tazobactam (ZOSYN) 3.375 g in 0.9% sodium chloride (MBP/ADV) 100 mL MBP, 3.375 g, IntraVENous, Q8H, Shanika Carvalho MD    Current Outpatient Medications:     amoxicillin-clavulanate (Augmentin) 875-125 mg per tablet, Take 1 Tablet by mouth two (2) times a day for 10 days. , Disp: 20 Tablet, Rfl: 0    lisinopriL (PRINIVIL, ZESTRIL) 5 mg tablet, Take 5 mg by mouth daily. , Disp: , Rfl:     omeprazole (PRILOSEC) 40 mg capsule, Take 40 mg by mouth daily. , Disp: , Rfl:     atorvastatin (LIPITOR) 20 mg tablet, Take 20 mg by mouth daily. , Disp: , Rfl:     empagliflozin (Jardiance) 10 mg tablet, Take 10 mg by mouth daily. , Disp: , Rfl:     metoprolol tartrate (LOPRESSOR) 100 mg IR tablet, Take 100 mg by mouth two (2) times a day., Disp: , Rfl:     aspirin delayed-release 81 mg tablet, Take 81 mg by mouth daily. , Disp: , Rfl:     ibuprofen 100 mg tablet, Take 200 mg by mouth every six (6) hours as needed for Pain., Disp: , Rfl:       ________________________________________________________________________    Signed: Daniel Nicholson MD

## 2022-05-17 NOTE — PROGRESS NOTES
Admission skin assessment pt declined full skin assessment but allowed visualization of left foot. First toe is wrapped with bandage, second toe black and dusky. Partial skin assessment completed with Neal Davis.

## 2022-05-17 NOTE — ED TRIAGE NOTES
Pt sent by Dr. Lilian Jauregui for possible surgery this morning. Went to [facility] and was sent down to ED.

## 2022-05-17 NOTE — ED NOTES
TRANSFER - OUT REPORT:    Verbal report given to [facility] [place] RN on Trish Lewis  being transferred to [facility] for routine progression of care       Report consisted of patients Situation, Background, Assessment and   Recommendations(SBAR). Information from the following report(s) SBAR, ED Summary, [facility] and Recent Results was reviewed with the receiving nurse. Lines:   Peripheral IV 05/17/22 Left Antecubital (Active)        Opportunity for questions and clarification was provided.       Patient transported with:   LEAFER

## 2022-05-17 NOTE — Clinical Note
Status[de-identified] INPATIENT [101]   Type of Bed: Medical [8]   Cardiac Monitoring Required?: No   Inpatient Hospitalization Certified Necessary for the Following Reasons: 1.  Patient Failed outpatient treatment (further clarification in H&P documentation)   Admitting Diagnosis: Osteomyelitis [facility]) [[id number]]   Admitting Physician: Betty Matos [[id number]]   Attending Physician: Betty Matos [[id number]]   Estimated Length of Stay: 2 Midnights   Discharge Plan[de-identified] Home with Office Follow-up

## 2022-05-17 NOTE — PROGRESS NOTES
Vancomycin Dosing Consult  Day #1 of vancomycin therapy  Consult ordered by Dr. Yane Shipley for this 64 y.o. male for indication of diabetic foot infection with osteomyelitis of the left great toe distal phalanx  Antibiotic regimen: Vancomycin + Zosyn    Temp (24hrs), Av.6 °F (37 °C), Min:98.6 °F (37 °C), Max:98.6 °F (37 °C)    Recent Labs     22  0730   WBC 6.6   CREA 0.93   BUN 12     Est CrCl: >100 ml/min  Concomitant nephrotoxic drugs: None    Cultures:    Wound: Pending    MRSA Swab: N/A    Target range: AUC/NEDA 400-600    Assessment/Plan:   Afebrile, WBC WNL  SCr appears to be near baseline  Given loading dose of 1750 mg x 1, will follow with a maintenance regimen of 1250 q12h for a predicted AUC of 508  Will check a level prior to dose  @ 1200  Antimicrobial stop date TBD

## 2022-05-18 LAB
ALBUMIN SERPL-MCNC: 3.1 G/DL (ref 3.5–5)
ALBUMIN/GLOB SERPL: 0.9 {RATIO} (ref 1.1–2.2)
ALP SERPL-CCNC: 102 U/L (ref 45–117)
ALT SERPL-CCNC: 36 U/L (ref 12–78)
ANION GAP SERPL CALC-SCNC: 7 MMOL/L (ref 5–15)
AST SERPL W P-5'-P-CCNC: 20 U/L (ref 15–37)
BACTERIA SPEC CULT: NORMAL
BACTERIA SPEC CULT: NORMAL
BASOPHILS # BLD: 0 K/UL (ref 0–0.1)
BASOPHILS NFR BLD: 1 % (ref 0–1)
BILIRUB SERPL-MCNC: 0.5 MG/DL (ref 0.2–1)
BUN SERPL-MCNC: 12 MG/DL (ref 6–20)
BUN/CREAT SERPL: 18 (ref 12–20)
CA-I BLD-MCNC: 9.2 MG/DL (ref 8.5–10.1)
CHLORIDE SERPL-SCNC: 102 MMOL/L (ref 97–108)
CO2 SERPL-SCNC: 26 MMOL/L (ref 21–32)
CREAT SERPL-MCNC: 0.68 MG/DL (ref 0.7–1.3)
CRP SERPL-MCNC: 2.15 MG/DL (ref 0–0.6)
DIFFERENTIAL METHOD BLD: NORMAL
EOSINOPHIL # BLD: 0.2 K/UL (ref 0–0.4)
EOSINOPHIL NFR BLD: 4 % (ref 0–7)
ERYTHROCYTE [DISTWIDTH] IN BLOOD BY AUTOMATED COUNT: 12.4 % (ref 11.5–14.5)
ERYTHROCYTE [SEDIMENTATION RATE] IN BLOOD: 56 MM/HR (ref 0–20)
EST. AVERAGE GLUCOSE BLD GHB EST-MCNC: 321 MG/DL
GLOBULIN SER CALC-MCNC: 3.3 G/DL (ref 2–4)
GLUCOSE BLD STRIP.AUTO-MCNC: 191 MG/DL (ref 65–117)
GLUCOSE BLD STRIP.AUTO-MCNC: 336 MG/DL (ref 65–117)
GLUCOSE BLD STRIP.AUTO-MCNC: 351 MG/DL (ref 65–117)
GLUCOSE BLD STRIP.AUTO-MCNC: 354 MG/DL (ref 65–117)
GLUCOSE BLD STRIP.AUTO-MCNC: 381 MG/DL (ref 65–117)
GLUCOSE BLD STRIP.AUTO-MCNC: 397 MG/DL (ref 65–117)
GLUCOSE BLD STRIP.AUTO-MCNC: 427 MG/DL (ref 65–117)
GLUCOSE SERPL-MCNC: 319 MG/DL (ref 65–100)
GRAM STN SPEC: NORMAL
GRAM STN SPEC: NORMAL
HBA1C MFR BLD: 12.8 % (ref 4–5.6)
HCT VFR BLD AUTO: 41 % (ref 36.6–50.3)
HGB BLD-MCNC: 13.9 G/DL (ref 12.1–17)
IMM GRANULOCYTES # BLD AUTO: 0 K/UL (ref 0–0.04)
IMM GRANULOCYTES NFR BLD AUTO: 0 % (ref 0–0.5)
LYMPHOCYTES # BLD: 2 K/UL (ref 0.8–3.5)
LYMPHOCYTES NFR BLD: 33 % (ref 12–49)
MCH RBC QN AUTO: 29.1 PG (ref 26–34)
MCHC RBC AUTO-ENTMCNC: 33.9 G/DL (ref 30–36.5)
MCV RBC AUTO: 86 FL (ref 80–99)
MONOCYTES # BLD: 0.4 K/UL (ref 0–1)
MONOCYTES NFR BLD: 6 % (ref 5–13)
NEUTS SEG # BLD: 3.4 K/UL (ref 1.8–8)
NEUTS SEG NFR BLD: 56 % (ref 32–75)
NRBC # BLD: 0 K/UL (ref 0–0.01)
NRBC BLD-RTO: 0 PER 100 WBC
PERFORMED BY, TECHID: ABNORMAL
PLATELET # BLD AUTO: 245 K/UL (ref 150–400)
PMV BLD AUTO: 10.7 FL (ref 8.9–12.9)
POTASSIUM SERPL-SCNC: 3.9 MMOL/L (ref 3.5–5.1)
PROT SERPL-MCNC: 6.4 G/DL (ref 6.4–8.2)
RBC # BLD AUTO: 4.77 M/UL (ref 4.1–5.7)
SODIUM SERPL-SCNC: 135 MMOL/L (ref 136–145)
SPECIAL REQUESTS,SREQ: NORMAL
WBC # BLD AUTO: 6.1 K/UL (ref 4.1–11.1)

## 2022-05-18 PROCEDURE — 74011250637 HC RX REV CODE- 250/637: Performed by: FAMILY MEDICINE

## 2022-05-18 PROCEDURE — 85652 RBC SED RATE AUTOMATED: CPT

## 2022-05-18 PROCEDURE — 80053 COMPREHEN METABOLIC PANEL: CPT

## 2022-05-18 PROCEDURE — 36415 COLL VENOUS BLD VENIPUNCTURE: CPT

## 2022-05-18 PROCEDURE — 74011636637 HC RX REV CODE- 636/637: Performed by: FAMILY MEDICINE

## 2022-05-18 PROCEDURE — 74011000258 HC RX REV CODE- 258: Performed by: FAMILY MEDICINE

## 2022-05-18 PROCEDURE — 99232 SBSQ HOSP IP/OBS MODERATE 35: CPT | Performed by: INTERNAL MEDICINE

## 2022-05-18 PROCEDURE — 85025 COMPLETE CBC W/AUTO DIFF WBC: CPT

## 2022-05-18 PROCEDURE — 82962 GLUCOSE BLOOD TEST: CPT

## 2022-05-18 PROCEDURE — 86140 C-REACTIVE PROTEIN: CPT

## 2022-05-18 PROCEDURE — 74011636637 HC RX REV CODE- 636/637: Performed by: INTERNAL MEDICINE

## 2022-05-18 PROCEDURE — 74011250636 HC RX REV CODE- 250/636: Performed by: FAMILY MEDICINE

## 2022-05-18 PROCEDURE — 65270000029 HC RM PRIVATE

## 2022-05-18 RX ORDER — INSULIN GLARGINE 100 [IU]/ML
15 INJECTION, SOLUTION SUBCUTANEOUS DAILY
Status: DISCONTINUED | OUTPATIENT
Start: 2022-05-19 | End: 2022-05-19

## 2022-05-18 RX ORDER — INSULIN LISPRO 100 [IU]/ML
15 INJECTION, SOLUTION INTRAVENOUS; SUBCUTANEOUS ONCE
Status: COMPLETED | OUTPATIENT
Start: 2022-05-18 | End: 2022-05-18

## 2022-05-18 RX ADMIN — INSULIN LISPRO 10 UNITS: 100 INJECTION, SOLUTION INTRAVENOUS; SUBCUTANEOUS at 09:41

## 2022-05-18 RX ADMIN — PIPERACILLIN AND TAZOBACTAM 3.38 G: 3; .375 INJECTION, POWDER, LYOPHILIZED, FOR SOLUTION INTRAVENOUS at 05:55

## 2022-05-18 RX ADMIN — INSULIN LISPRO 18 UNITS: 100 INJECTION, SOLUTION INTRAVENOUS; SUBCUTANEOUS at 16:30

## 2022-05-18 RX ADMIN — METOPROLOL TARTRATE 100 MG: 50 TABLET, FILM COATED ORAL at 22:29

## 2022-05-18 RX ADMIN — INSULIN LISPRO 15 UNITS: 100 INJECTION, SOLUTION INTRAVENOUS; SUBCUTANEOUS at 22:00

## 2022-05-18 RX ADMIN — ENOXAPARIN SODIUM 40 MG: 100 INJECTION SUBCUTANEOUS at 09:47

## 2022-05-18 RX ADMIN — PIPERACILLIN AND TAZOBACTAM 3.38 G: 3; .375 INJECTION, POWDER, LYOPHILIZED, FOR SOLUTION INTRAVENOUS at 15:40

## 2022-05-18 RX ADMIN — ASPIRIN 81 MG: 81 TABLET, COATED ORAL at 09:42

## 2022-05-18 RX ADMIN — METOPROLOL TARTRATE 100 MG: 50 TABLET, FILM COATED ORAL at 09:47

## 2022-05-18 RX ADMIN — PIPERACILLIN AND TAZOBACTAM 3.38 G: 3; .375 INJECTION, POWDER, LYOPHILIZED, FOR SOLUTION INTRAVENOUS at 22:26

## 2022-05-18 RX ADMIN — ACETAMINOPHEN 650 MG: 325 TABLET ORAL at 18:13

## 2022-05-18 RX ADMIN — LISINOPRIL 5 MG: 5 TABLET ORAL at 09:42

## 2022-05-18 RX ADMIN — PANTOPRAZOLE SODIUM 40 MG: 40 TABLET, DELAYED RELEASE ORAL at 09:42

## 2022-05-18 RX ADMIN — ATORVASTATIN CALCIUM 20 MG: 20 TABLET, FILM COATED ORAL at 09:42

## 2022-05-18 RX ADMIN — INSULIN LISPRO 15 UNITS: 100 INJECTION, SOLUTION INTRAVENOUS; SUBCUTANEOUS at 13:12

## 2022-05-18 NOTE — PROGRESS NOTES
CM reviewed chart. Patient awaiting for possible procedure, currently on IV antibiotics. Will reassess after procedure for any needs at home.     DC plan TBD

## 2022-05-18 NOTE — PROGRESS NOTES
Problem: Falls - Risk of  Goal: *Absence of Falls  Description: Document Morene Hole Fall Risk and appropriate interventions in the flowsheet.   Outcome: Progressing Towards Goal  Note: Fall Risk Interventions:                 Elimination Interventions: Bed/chair exit alarm              Problem: Patient Education: Go to Patient Education Activity  Goal: Patient/Family Education  Outcome: Progressing Towards Goal     Problem: Discharge Planning  Goal: *Discharge to safe environment  Outcome: Progressing Towards Goal  Goal: *Knowledge of medication management  Outcome: Progressing Towards Goal  Goal: *Knowledge of discharge instructions  Outcome: Progressing Towards Goal     Problem: Patient Education: Go to Patient Education Activity  Goal: Patient/Family Education  Outcome: Progressing Towards Goal

## 2022-05-18 NOTE — PROGRESS NOTES
Progress Note    Patient: Tamia Mendez MRN: [id number]  SSN: [id number]    YOB: 1960  Age: 64 y.o. Sex: male      Admit Date: 5/17/2022    LOS: 1 day     Subjective:   Patient followed for left diabetic foot infection with osteomyelitis left great toe distal phalanx with planned amputation per Podiatry. He is afebrile with normal WBC but increasing ESR and CRP. Currently on IV Zosyn alone. Patient sitting up in bedside chair eating dinner and not expected to go to the OR today. No complaints except he is worried about the amount of time he is out of work. Objective:     Vitals:    05/17/22 1701 05/17/22 2003 05/18/22 0056 05/18/22 0740   BP: 131/83 122/72 132/70 125/79   Pulse: 92  90 85   Resp: 20 18 20 20   Temp: 98 °F (36.7 °C) 98.4 °F (36.9 °C) 97.9 °F (36.6 °C) 98.2 °F (36.8 °C)   SpO2: 98% 95%  98%   Weight:       Height:            Intake and Output:  Current Shift: 05/18 0701 - 05/18 1900  In: 1100 [P.O.:300; I.V.:800]  Out: -   Last three shifts: No intake/output data recorded. Physical Exam:    Vitals and nursing note reviewed. Constitutional:       Appearance: He is obese. He is ill-appearing. HENT:      Head: Normocephalic and atraumatic. Right Ear: External ear normal.      Left Ear: External ear normal.      Nose: Nose normal.      Mouth/Throat:      Pharynx: Oropharynx is clear. Eyes:      Pupils: Pupils are equal, round, and reactive to light. Cardiovascular:      Rate and Rhythm: Normal rate and regular rhythm. Heart sounds: No murmur heard.       Pulmonary:      Effort: Pulmonary effort is normal.      Breath sounds: Normal breath sounds. Abdominal:      General: Bowel sounds are normal.      Palpations: Abdomen is soft. Tenderness: There is no abdominal tenderness. Genitourinary:     Comments: No George  Musculoskeletal:         General: Swelling present. Right lower leg: No edema. Left lower leg: Edema present. Feet:    Skin:     Findings: No rash. Neurological:      General: No focal deficit present. Mental Status: He is oriented to person, place, and time. Psychiatric:         Mood and Affect: Mood normal.         Behavior: Behavior normal.         Thought Content: Thought content normal.         Judgment: Judgment normal.     Lab/Data Review:     WBC 6,100    ESR 56 <46  CRP 2.15 <1.39    Blood cultures (5/17) Pending  Blood cultures (5/17) Pending  Wound culture left great toe (5/17) Pending  Wound culture left great toe (5/17) Pending    Assessment:     Active Problems:    Osteomyelitis ([facility] [address] (5/17/2022)    1. Diabetic foot infection, left great toe, culture pending, Day #2 IV Zosyn  2. Osteomyelitis, left first distal phalanx, secondary to #, Day #2 IV Zosyn  3. Elevated CRP and ESR secondary to above  4. Uncontrolled diabetes mellitus  5. Pending surgery    Plan:     1. Continue IV Zosyn  2. In am, repeat ESR and CRP  3. Follow-up blood and wound cultures  4.  Surgery pending, presumed partial amputation left great toe       Signed By: Felix Weber MD     May 18, 2022

## 2022-05-18 NOTE — PROGRESS NOTES
General Daily Progress Note          Patient Name:   Sarah Pittman       YOB: 1960       Age:  64 y.o.       Admit Date: 5/17/2022      Subjective:       Patient is a 64y.o. year old male history of diabetes hypertension came to emergency room because of left great toe infection patient was sent by the podiatrist concerned about osteomyelitis seen by the ER physician MRI done shows osteomyelitis of the left distal great toe       Resting in  the bed alert awake not in distress    Objective:     Visit Vitals  /79 (BP 1 Location: Right upper arm, BP Patient Position: Semi fowlers)   Pulse 85   Temp 98.2 °F (36.8 °C)   Resp 20   Ht 6' 2\" (1.88 m)   Wt 112 kg (247 lb)   SpO2 98%   BMI 31.71 kg/m²        Recent Results (from the past 24 hour(s))   CULTURE, WOUND W GRAM STAIN    Collection Time: 05/17/22  8:45 AM    Specimen: Wound   Result Value Ref Range    Special Requests: No Special Requests      GRAM STAIN Occasional WBCs seen      GRAM STAIN No organisms seen      Culture result: PENDING    GLUCOSE, POC    Collection Time: 05/17/22  3:35 PM   Result Value Ref Range    Glucose (POC) 339 (H) 65 - 117 mg/dL    Performed by Brennonrt, POC    Collection Time: 05/17/22 10:05 PM   Result Value Ref Range    Glucose (POC) 526 (H) 65 - 117 mg/dL    Performed by Mis Whitaker    GLUCOSE, POC    Collection Time: 05/17/22 11:58 PM   Result Value Ref Range    Glucose (POC) 266 (H) 65 - 117 mg/dL    Performed by Denis Del Rosario, POC    Collection Time: 05/18/22  2:29 AM   Result Value Ref Range    Glucose (POC) 191 (H) 65 - 117 mg/dL    Performed by Laura Infante    METABOLIC PANEL, COMPREHENSIVE    Collection Time: 05/18/22  6:45 AM   Result Value Ref Range    Sodium 135 (L) 136 - 145 mmol/L    Potassium 3.9 3.5 - 5.1 mmol/L    Chloride 102 97 - 108 mmol/L    CO2 26 21 - 32 mmol/L    Anion gap 7 5 - 15 mmol/L    Glucose 319 (H) 65 - 100 mg/dL    BUN 12 6 - 20 mg/dL Creatinine 0.68 (L) 0.70 - 1.30 mg/dL    BUN/Creatinine ratio 18 12 - 20      GFR est AA >60 >60 ml/min/1.73m2    GFR est non-AA >60 >60 ml/min/1.73m2    Calcium 9.2 8.5 - 10.1 mg/dL    Bilirubin, total 0.5 0.2 - 1.0 mg/dL    AST (SGOT) 20 15 - 37 U/L    ALT (SGPT) 36 12 - 78 U/L    Alk. phosphatase 102 45 - 117 U/L    Protein, total 6.4 6.4 - 8.2 g/dL    Albumin 3.1 (L) 3.5 - 5.0 g/dL    Globulin 3.3 2.0 - 4.0 g/dL    A-G Ratio 0.9 (L) 1.1 - 2.2     CBC WITH AUTOMATED DIFF    Collection Time: 05/18/22  6:45 AM   Result Value Ref Range    WBC 6.1 4.1 - 11.1 K/uL    RBC 4.77 4.10 - 5.70 M/uL    HGB 13.9 12.1 - 17.0 g/dL    HCT 41.0 36.6 - 50.3 %    MCV 86.0 80.0 - 99.0 FL    MCH 29.1 26.0 - 34.0 PG    MCHC 33.9 30.0 - 36.5 g/dL    RDW 12.4 11.5 - 14.5 %    PLATELET 348 823 - 023 K/uL    MPV 10.7 8.9 - 12.9 FL    NRBC 0.0 0.0  WBC    ABSOLUTE NRBC 0.00 0.00 - 0.01 K/uL    NEUTROPHILS 56 32 - 75 %    LYMPHOCYTES 33 12 - 49 %    MONOCYTES 6 5 - 13 %    EOSINOPHILS 4 0 - 7 %    BASOPHILS 1 0 - 1 %    IMMATURE GRANULOCYTES 0 0 - 0.5 %    ABS. NEUTROPHILS 3.4 1.8 - 8.0 K/UL    ABS. LYMPHOCYTES 2.0 0.8 - 3.5 K/UL    ABS. MONOCYTES 0.4 0.0 - 1.0 K/UL    ABS. EOSINOPHILS 0.2 0.0 - 0.4 K/UL    ABS. BASOPHILS 0.0 0.0 - 0.1 K/UL    ABS. IMM. GRANS. 0.0 0.00 - 0.04 K/UL    DF AUTOMATED     C REACTIVE PROTEIN, QT    Collection Time: 05/18/22  6:45 AM   Result Value Ref Range    C-Reactive protein 2.15 (H) 0.00 - 0.60 mg/dL   SED RATE (ESR)    Collection Time: 05/18/22  6:45 AM   Result Value Ref Range    Sed rate, automated 56 (H) 0 - 20 mm/hr     [unfilled]      Review of Systems    Constitutional: Negative for chills and fever. HENT: Negative. Eyes: Negative. Respiratory: Negative. Cardiovascular: Negative. Gastrointestinal: Negative for abdominal pain and nausea. Skin: Negative. Neurological: Negative. Physical Exam:      Constitutional: pt is oriented to person, place, and time.    HENT: Head: Normocephalic and atraumatic. Eyes: Pupils are equal, round, and reactive to light. EOM are normal.   Cardiovascular: Normal rate, regular rhythm and normal heart sounds. Pulmonary/Chest: Breath sounds normal. No wheezes. No rales. Exhibits no tenderness. Abdominal: Soft. Bowel sounds are normal. There is no abdominal tenderness. There is no rebound and no guarding. Musculoskeletal: Normal range of motion. Neurological: pt is alert and oriented to person, place, and time.      MRI FOOT LT WO CONT   Final Result   Osteomyelitis of the first distal phalanx           Recent Results (from the past 24 hour(s))   CULTURE, WOUND W GRAM STAIN    Collection Time: 05/17/22  8:45 AM    Specimen: Wound   Result Value Ref Range    Special Requests: No Special Requests      GRAM STAIN Occasional WBCs seen      GRAM STAIN No organisms seen      Culture result: PENDING    GLUCOSE, POC    Collection Time: 05/17/22  3:35 PM   Result Value Ref Range    Glucose (POC) 339 (H) 65 - 117 mg/dL    Performed by Angel Scott    GLUCOSE, POC    Collection Time: 05/17/22 10:05 PM   Result Value Ref Range    Glucose (POC) 526 (H) 65 - 117 mg/dL    Performed by Joseph Seymour    GLUCOSE, POC    Collection Time: 05/17/22 11:58 PM   Result Value Ref Range    Glucose (POC) 266 (H) 65 - 117 mg/dL    Performed by Agueda Mcdonald, POC    Collection Time: 05/18/22  2:29 AM   Result Value Ref Range    Glucose (POC) 191 (H) 65 - 117 mg/dL    Performed by ERICA SMALL    METABOLIC PANEL, COMPREHENSIVE    Collection Time: 05/18/22  6:45 AM   Result Value Ref Range    Sodium 135 (L) 136 - 145 mmol/L    Potassium 3.9 3.5 - 5.1 mmol/L    Chloride 102 97 - 108 mmol/L    CO2 26 21 - 32 mmol/L    Anion gap 7 5 - 15 mmol/L    Glucose 319 (H) 65 - 100 mg/dL    BUN 12 6 - 20 mg/dL    Creatinine 0.68 (L) 0.70 - 1.30 mg/dL    BUN/Creatinine ratio 18 12 - 20      GFR est AA >60 >60 ml/min/1.73m2    GFR est non-AA >60 >60 ml/min/1.73m2 Calcium 9.2 8.5 - 10.1 mg/dL    Bilirubin, total 0.5 0.2 - 1.0 mg/dL    AST (SGOT) 20 15 - 37 U/L    ALT (SGPT) 36 12 - 78 U/L    Alk. phosphatase 102 45 - 117 U/L    Protein, total 6.4 6.4 - 8.2 g/dL    Albumin 3.1 (L) 3.5 - 5.0 g/dL    Globulin 3.3 2.0 - 4.0 g/dL    A-G Ratio 0.9 (L) 1.1 - 2.2     CBC WITH AUTOMATED DIFF    Collection Time: 05/18/22  6:45 AM   Result Value Ref Range    WBC 6.1 4.1 - 11.1 K/uL    RBC 4.77 4.10 - 5.70 M/uL    HGB 13.9 12.1 - 17.0 g/dL    HCT 41.0 36.6 - 50.3 %    MCV 86.0 80.0 - 99.0 FL    MCH 29.1 26.0 - 34.0 PG    MCHC 33.9 30.0 - 36.5 g/dL    RDW 12.4 11.5 - 14.5 %    PLATELET 056 908 - 773 K/uL    MPV 10.7 8.9 - 12.9 FL    NRBC 0.0 0.0  WBC    ABSOLUTE NRBC 0.00 0.00 - 0.01 K/uL    NEUTROPHILS 56 32 - 75 %    LYMPHOCYTES 33 12 - 49 %    MONOCYTES 6 5 - 13 %    EOSINOPHILS 4 0 - 7 %    BASOPHILS 1 0 - 1 %    IMMATURE GRANULOCYTES 0 0 - 0.5 %    ABS. NEUTROPHILS 3.4 1.8 - 8.0 K/UL    ABS. LYMPHOCYTES 2.0 0.8 - 3.5 K/UL    ABS. MONOCYTES 0.4 0.0 - 1.0 K/UL    ABS. EOSINOPHILS 0.2 0.0 - 0.4 K/UL    ABS. BASOPHILS 0.0 0.0 - 0.1 K/UL    ABS. IMM.  GRANS. 0.0 0.00 - 0.04 K/UL    DF AUTOMATED     C REACTIVE PROTEIN, QT    Collection Time: 05/18/22  6:45 AM   Result Value Ref Range    C-Reactive protein 2.15 (H) 0.00 - 0.60 mg/dL   SED RATE (ESR)    Collection Time: 05/18/22  6:45 AM   Result Value Ref Range    Sed rate, automated 56 (H) 0 - 20 mm/hr       Results     Procedure Component Value Units Date/Time    CULTURE, BLOOD #2 [[id number]] Collected: 05/17/22 2029    Order Status: Completed Specimen: Blood Updated: 05/17/22 2044    CULTURE, BLOOD #1 [[id number]] Collected: 05/17/22 2015    Order Status: Completed Specimen: Blood Updated: 05/17/22 2044    KELSEY, Tory Blackwell STAIN [[id number]] Collected: 05/17/22 1920    Order Status: Completed Specimen: Wound from [facility] Updated: 05/17/22 1926    CULTURE, Tory Blackwell STAIN [[id number]] Collected: 05/17/22 0845    Order Status: Completed Specimen: Wound Updated: 05/17/22 2309     Special Requests: No Special Requests        GRAM STAIN Occasional WBCs seen         No organisms seen        Culture result: PENDING    CULTURE, BLOOD, PAIRED [[id number]] Collected: 05/11/22 1115    Order Status: Completed Specimen: Blood Updated: 05/17/22 0804     Special Requests: No Special Requests        Culture result: No growth 6 days              Labs:     Recent Labs     05/18/22  0645 05/17/22  0730   WBC 6.1 6.6   HGB 13.9 15.4   HCT 41.0 44.0    289     Recent Labs     05/18/22  0645 05/17/22  0730   * 133*   K 3.9 4.4    100   CO2 26 25   BUN 12 12   CREA 0.68* 0.93   * 491*   CA 9.2 9.9     Recent Labs     05/18/22  0645   ALT 36      TBILI 0.5   TP 6.4   ALB 3.1*   GLOB 3.3     Recent Labs     05/17/22  0730   INR 1.0   PTP 12.9      No results for input(s): FE, TIBC, PSAT, FERR in the last 72 hours. No results found for: FOL, RBCF   No results for input(s): PH, PCO2, PO2 in the last 72 hours. No results for input(s): CPK, CKNDX, TROIQ in the last 72 hours.     No lab exists for component: CPKMB  Lab Results   Component Value Date/Time    Cholesterol, total 212 (H) 07/13/2021 03:35 PM    HDL Cholesterol 68 07/13/2021 03:35 PM    LDL, calculated 95 07/13/2021 03:35 PM    Triglyceride 296 (H) 07/13/2021 03:35 PM     Lab Results   Component Value Date/Time    Glucose (POC) 191 (H) 05/18/2022 02:29 AM    Glucose (POC) 266 (H) 05/17/2022 11:58 PM    Glucose (POC) 526 (H) 05/17/2022 10:05 PM    Glucose (POC) 339 (H) 05/17/2022 03:35 PM    Glucose (POC) 484 (H) 05/17/2022 07:14 AM     Lab Results   Component Value Date/Time    Color Yellow/Straw 06/22/2021 11:34 AM    Appearance Clear 06/22/2021 11:34 AM    Specific gravity 1.029 06/22/2021 11:34 AM    pH (UA) 5.0 06/22/2021 11:34 AM    Protein Negative 06/22/2021 11:34 AM    Glucose >300 (A) 06/22/2021 11:34 AM    Ketone 5 (A) 06/22/2021 11:34 AM    Bilirubin Negative 06/22/2021 11:34 AM    Urobilinogen 0.1 06/22/2021 11:34 AM    Nitrites Negative 06/22/2021 11:34 AM    Leukocyte Esterase Negative 06/22/2021 11:34 AM    Bacteria Negative 06/22/2021 11:34 AM    WBC 0-5 06/22/2021 11:34 AM    RBC 0-5 06/22/2021 11:34 AM         Assessment:     Osteomyelitis of the left great toe distal phalanx  Type 2 diabetes  Hypertension  GERD      Plan:       Continue IV Zosyn  Vancomycin discontinued by the infectious disease  Seen by the podiatrist plan for partial amputation of the left great toe      Current Facility-Administered Medications:     aspirin delayed-release tablet 81 mg, 81 mg, Oral, DAILY, Manolo Carvalho MD    atorvastatin (LIPITOR) tablet 20 mg, 20 mg, Oral, DAILY, Manolo Carvalho MD    lisinopriL (PRINIVIL, ZESTRIL) tablet 5 mg, 5 mg, Oral, DAILY, Manolo Carvalho MD    metoprolol tartrate (LOPRESSOR) tablet 100 mg, 100 mg, Oral, BID, Shanika Carvalho MD, 100 mg at 05/17/22 2229    pantoprazole (PROTONIX) tablet 40 mg, 40 mg, Oral, DAILY, Manolo Carvalho MD    insulin lispro (HUMALOG) injection, , SubCUTAneous, AC&HS, Dago Bush MD, 10 Units at 05/17/22 1542    glucose chewable tablet 16 g, 4 Tablet, Oral, PRN, Manolo Carvalho MD    glucagon (GLUCAGEN) injection 1 mg, 1 mg, IntraMUSCular, PRN, Manolo Carvalho MD    0.9% sodium chloride infusion, 75 mL/hr, IntraVENous, CONTINUOUS, Shanika Carvalho MD, Last Rate: 75 mL/hr at 05/17/22 1542, 75 mL/hr at 05/17/22 1542    acetaminophen (TYLENOL) tablet 650 mg, 650 mg, Oral, Q6H PRN **OR** acetaminophen (TYLENOL) suppository 650 mg, 650 mg, Rectal, Q6H PRN, Manolo Carvalho MD    polyethylene glycol (MIRALAX) packet 17 g, 17 g, Oral, DAILY PRN, Manolo Carvalho MD    ondansetron (ZOFRAN ODT) tablet 4 mg, 4 mg, Oral, Q8H PRN **OR** ondansetron (ZOFRAN) injection 4 mg, 4 mg, IntraVENous, Q6H PRN, Shanika Carvalho MD    enoxaparin (LOVENOX) injection 40 mg, 40 mg, SubCUTAneous, DAILY, Shaw Edmond MD    piperacillin-tazobactam (ZOSYN) 3.375 g in 0.9% sodium chloride (MBP/ADV) 100 mL MBP, 3.375 g, IntraVENous, Q8H, Shanika Carvalho MD, Last Rate: 200 mL/hr at 05/18/22 0555, 3.375 g at 05/18/22 0555

## 2022-05-18 NOTE — CONSULTS
[facility] CONSULT NOTE    Subjective:         Date of Consultation: May 17,2022     Referring Physician: Lexx Farmer MD    Patient is a 64 y.o. male who is being seen for left great toe diabetic ulcer. Patient has a hx of A. fib, CAD, uncontrolled diabetes mellitus type 2 with neuropathy, diverticulosis, dysphagia, GERD, hemorrhoids, hypercholesterolemia and hypertension. Patient is known to my service and has been seen outpatient for the same. He states he developed a wound to the left great toe a few weeks prior. He states he is attempted home wound care. He states when is progressively gotten worse. He denies any associated pain. He denies any systemic signs of infection. He denies any overt trauma.   He denies any other lower extremity complaints    Patient Active Problem List    Diagnosis Date Noted    Osteomyelitis ([address] 05/17/2022    Noncompliance 02/14/2022    Urinary frequency 10/04/2021    Prostate cancer screening 09/21/2021    Low testosterone in male 01/11/2021    Essential hypertension 12/09/2020    Benign prostatic hyperplasia 12/09/2020    Paronychia of great toe of left foot 11/11/2020    Diabetic polyneuropathy associated with type 2 diabetes mellitus ([address] 11/11/2020    Type 2 diabetes mellitus with diabetic polyneuropathy, without long-term current use of insulin ([address] 11/11/2020    Other male erectile dysfunction 11/11/2020     Past Medical History:   Diagnosis Date    Arrhythmia     Hx of AFIB    CAD (coronary artery disease)     Diabetes ([address]     Diverticulosis     Dysphagia     GERD (gastroesophageal reflux disease)     Hx of hemorrhoids     Hypercholesterolemia     Hypertension       Past Surgical History:   Procedure Laterality Date    HX COLONOSCOPY      HX ORTHOPAEDIC Left     wrist     HX SHOULDER ARTHROSCOPY Right 06/09/2021    VT CARDIAC SURG PROCEDURE UNLIST      Atrial fibrilation       Family History   Problem Relation Age of Onset    Cancer Father       Social History     Tobacco Use    Smoking status: Former Smoker     Packs/day: 0.25     Years: 3.00     Pack years: 0.75    Smokeless tobacco: Never Used   Substance Use Topics    Alcohol use:  Yes     Alcohol/week: 6.0 standard drinks     Types: 6 Cans of beer per week     Current Facility-Administered Medications   Medication Dose Route Frequency Provider Last Rate Last Admin    aspirin delayed-release tablet 81 mg  81 mg Oral DAILY Shanika Carvalho MD   81 mg at 05/18/22 4718    atorvastatin (LIPITOR) tablet 20 mg  20 mg Oral DAILY Shanika Carvalho MD   20 mg at 05/18/22 2186    lisinopriL (PRINIVIL, ZESTRIL) tablet 5 mg  5 mg Oral DAILY Shanika Carvalho MD   5 mg at 05/18/22 4826    metoprolol tartrate (LOPRESSOR) tablet 100 mg  100 mg Oral BID Shanika Carvalho MD   100 mg at 05/18/22 0947    pantoprazole (PROTONIX) tablet 40 mg  40 mg Oral DAILY Shanika Carvalho MD   40 mg at 05/18/22 3008    insulin lispro (HUMALOG) injection   SubCUTAneous AC&HS Dharmesh Carvalho MD   15 Units at 05/18/22 1312    glucose chewable tablet 16 g  4 Tablet Oral PRN Dharmesh Carvalho MD        glucagon (GLUCAGEN) injection 1 mg  1 mg IntraMUSCular PRN Tonio Almonte MD        0.9% sodium chloride infusion  75 mL/hr IntraVENous CONTINUOUS Shanika Carvalho MD 75 mL/hr at 05/17/22 1542 75 mL/hr at 05/17/22 1542    acetaminophen (TYLENOL) tablet 650 mg  650 mg Oral Q6H PRN Dharmesh Carvalho MD        Or   [facility] acetaminophen (TYLENOL) suppository 650 mg  650 mg Rectal Q6H PRN Dharmesh Carvalho MD        polyethylene glycol (MIRALAX) packet 17 g  17 g Oral DAILY PRN Dharmesh Carvalho MD        ondansetron (ZOFRAN ODT) tablet 4 mg  4 mg Oral Q8H PRN Dharmesh Carvalho MD        Or    ondansetron [facility]) injection 4 mg  4 mg IntraVENous Q6H PRN Dharmesh Carvalho MD        enoxaparin (LOVENOX) injection 40 mg  40 mg SubCUTAneous DAILY Shanika Carvalho MD   40 mg at 05/18/22 4287  piperacillin-tazobactam (ZOSYN) 3.375 g in 0.9% sodium chloride (MBP/ADV) 100 mL MBP  3.375 g IntraVENous Q8H Shanika Carvalho  mL/hr at 22 0555 3.375 g at 22 0555      No Known Allergies     Review of Systems:    Constitutional: Negative. HENT: Negative. Eyes: Negative. Respiratory: Negative. Cardiovascular: Negative. Gastrointestinal: Negative. Endocrine: Negative. Genitourinary: Negative. Musculoskeletal: Negative. Allergic/Immunologic: Positive for immunocompromised state. Neurological: Positive for numbness. Hematological: Negative. Psychiatric/Behavioral: Negative. All other systems reviewed and are negative. Objective:     Patient Vitals for the past 8 hrs:   BP Temp Pulse Resp SpO2   22 0740 125/79 98.2 °F (36.8 °C) 85 20 98 %     Temp (24hrs), Av.1 °F (36.7 °C), Min:97.9 °F (36.6 °C), Max:98.4 °F (36.9 °C)      Physical Exam:    Vitals reviewed. Constitutional:       Appearance: He is obese. Cardiovascular:      Pulses:           Dorsalis pedis pulses are 2+ on the right side and 2+ on the left side. Posterior tibial pulses are 2+ on the right side and 2+ on the left side. Pulmonary:      Effort: Pulmonary effort is normal.   Musculoskeletal:      Right lower leg: No edema. Left lower leg: No edema. Right foot: Normal range of motion. No deformity or bunion. Left foot: Normal range of motion. No deformity or bunion. Feet:      Right foot:      Protective Sensation: 10 sites tested. 0 sites sensed. Skin integrity: Skin integrity normal.      Toenail Condition: Right toenails are normal.      Left foot:      Protective Sensation: 10 sites tested. 0 sites sensed. Skin integrity: Ulcer present. Toenail Condition: Left toenails are ingrown. Lymphadenopathy:      Lower Body: No right inguinal adenopathy. No left inguinal adenopathy. Skin:     General: Skin is warm.       Capillary Refill: Capillary refill takes 2 to 3 seconds. Neurological:      Mental Status: He is alert and oriented to person, place, and time. Psychiatric:         Mood and Affect: Mood and affect normal.         Behavior: Behavior is cooperative. Lab Review:   Recent Results (from the past 24 hour(s))   GLUCOSE, POC    Collection Time: 05/17/22  3:35 PM   Result Value Ref Range    Glucose (POC) 339 (H) 65 - 117 mg/dL    Performed by Gail Wilks    GLUCOSE, POC    Collection Time: 05/17/22 10:05 PM   Result Value Ref Range    Glucose (POC) 526 (H) 65 - 117 mg/dL    Performed by Mis Whitaker    GLUCOSE, POC    Collection Time: 05/17/22 11:58 PM   Result Value Ref Range    Glucose (POC) 266 (H) 65 - 117 mg/dL    Performed by Denis Del Rosario, POC    Collection Time: 05/18/22  2:29 AM   Result Value Ref Range    Glucose (POC) 191 (H) 65 - 117 mg/dL    Performed by ERICA SMALL    METABOLIC PANEL, COMPREHENSIVE    Collection Time: 05/18/22  6:45 AM   Result Value Ref Range    Sodium 135 (L) 136 - 145 mmol/L    Potassium 3.9 3.5 - 5.1 mmol/L    Chloride 102 97 - 108 mmol/L    CO2 26 21 - 32 mmol/L    Anion gap 7 5 - 15 mmol/L    Glucose 319 (H) 65 - 100 mg/dL    BUN 12 6 - 20 mg/dL    Creatinine 0.68 (L) 0.70 - 1.30 mg/dL    BUN/Creatinine ratio 18 12 - 20      GFR est AA >60 >60 ml/min/1.73m2    GFR est non-AA >60 >60 ml/min/1.73m2    Calcium 9.2 8.5 - 10.1 mg/dL    Bilirubin, total 0.5 0.2 - 1.0 mg/dL    AST (SGOT) 20 15 - 37 U/L    ALT (SGPT) 36 12 - 78 U/L    Alk.  phosphatase 102 45 - 117 U/L    Protein, total 6.4 6.4 - 8.2 g/dL    Albumin 3.1 (L) 3.5 - 5.0 g/dL    Globulin 3.3 2.0 - 4.0 g/dL    A-G Ratio 0.9 (L) 1.1 - 2.2     CBC WITH AUTOMATED DIFF    Collection Time: 05/18/22  6:45 AM   Result Value Ref Range    WBC 6.1 4.1 - 11.1 K/uL    RBC 4.77 4.10 - 5.70 M/uL    HGB 13.9 12.1 - 17.0 g/dL    HCT 41.0 36.6 - 50.3 %    MCV 86.0 80.0 - 99.0 FL    MCH 29.1 26.0 - 34.0 PG    MCHC 33.9 30.0 - 36.5 g/dL    RDW 12.4 11.5 - 14.5 %    PLATELET 473 421 - 599 K/uL    MPV 10.7 8.9 - 12.9 FL    NRBC 0.0 0.0  WBC    ABSOLUTE NRBC 0.00 0.00 - 0.01 K/uL    NEUTROPHILS 56 32 - 75 %    LYMPHOCYTES 33 12 - 49 %    MONOCYTES 6 5 - 13 %    EOSINOPHILS 4 0 - 7 %    BASOPHILS 1 0 - 1 %    IMMATURE GRANULOCYTES 0 0 - 0.5 %    ABS. NEUTROPHILS 3.4 1.8 - 8.0 K/UL    ABS. LYMPHOCYTES 2.0 0.8 - 3.5 K/UL    ABS. MONOCYTES 0.4 0.0 - 1.0 K/UL    ABS. EOSINOPHILS 0.2 0.0 - 0.4 K/UL    ABS. BASOPHILS 0.0 0.0 - 0.1 K/UL    ABS. IMM. GRANS. 0.0 0.00 - 0.04 K/UL    DF AUTOMATED     C REACTIVE PROTEIN, QT    Collection Time: 05/18/22  6:45 AM   Result Value Ref Range    C-Reactive protein 2.15 (H) 0.00 - 0.60 mg/dL   SED RATE (ESR)    Collection Time: 05/18/22  6:45 AM   Result Value Ref Range    Sed rate, automated 56 (H) 0 - 20 mm/hr   GLUCOSE, POC    Collection Time: 05/18/22  9:12 AM   Result Value Ref Range    Glucose (POC) 336 (H) 65 - 117 mg/dL    Performed by Khanh Somers RN (Traveler)    GLUCOSE, POC    Collection Time: 05/18/22 11:45 AM   Result Value Ref Range    Glucose (POC) 354 (H) 65 - 117 mg/dL    Performed by Rohini Sanford    GLUCOSE, POC    Collection Time: 05/18/22 12:11 PM   Result Value Ref Range    Glucose (POC) 351 (H) 65 - 117 mg/dL    Performed by Gemma 68, POC    Collection Time: 05/18/22  2:18 PM   Result Value Ref Range    Glucose (POC) 397 (H) 65 - 117 mg/dL    Performed by Khanh Somers RN (Traveler)        Impression:     Osteomyelitis, left great toe  Uncontrolled DM T2 with Neuropathy    Recommendation:     Patient seen and evaluated at bedside  - Current labs personally reviewed and findings dicussed with patient  - Local care performed to the left foot. Offloading for wound healing purposes  - XR and MRI images of the left foot personally reviewed and findings discussed with pt  - Tx options reviewed, conservative or surgical.  Possible complications of each discussed.   Patient elected for surgical intervention. Consent to be signed/witnessed  - Abx per ID  - I will follow closely    Thank you for the consult! Gurpreet Burton, [facility], [address] [place] [address] and [facility]  [address] [facility] [address], [facility]  [place] Shoulder:  [phone]  F:  [phone]  C:  [phone]

## 2022-05-19 ENCOUNTER — ANESTHESIA (OUTPATIENT)
Dept: SURGERY | Age: 62
DRG: 314 | End: 2022-05-19
Payer: MEDICAID

## 2022-05-19 ENCOUNTER — ANESTHESIA EVENT (OUTPATIENT)
Dept: SURGERY | Age: 62
DRG: 314 | End: 2022-05-19
Payer: MEDICAID

## 2022-05-19 VITALS
BODY MASS INDEX: 31.7 KG/M2 | HEART RATE: 92 BPM | TEMPERATURE: 98.6 F | WEIGHT: 247 LBS | HEIGHT: 74 IN | DIASTOLIC BLOOD PRESSURE: 75 MMHG | OXYGEN SATURATION: 96 % | RESPIRATION RATE: 15 BRPM | SYSTOLIC BLOOD PRESSURE: 114 MMHG

## 2022-05-19 LAB
ALBUMIN SERPL-MCNC: 3 G/DL (ref 3.5–5)
ALBUMIN/GLOB SERPL: 0.9 {RATIO} (ref 1.1–2.2)
ALP SERPL-CCNC: 107 U/L (ref 45–117)
ALT SERPL-CCNC: 36 U/L (ref 12–78)
ANION GAP SERPL CALC-SCNC: 6 MMOL/L (ref 5–15)
AST SERPL W P-5'-P-CCNC: 20 U/L (ref 15–37)
BILIRUB SERPL-MCNC: 0.4 MG/DL (ref 0.2–1)
BUN SERPL-MCNC: 12 MG/DL (ref 6–20)
BUN/CREAT SERPL: 16 (ref 12–20)
CA-I BLD-MCNC: 9.6 MG/DL (ref 8.5–10.1)
CHLORIDE SERPL-SCNC: 104 MMOL/L (ref 97–108)
CO2 SERPL-SCNC: 27 MMOL/L (ref 21–32)
CREAT SERPL-MCNC: 0.74 MG/DL (ref 0.7–1.3)
CRP SERPL-MCNC: 1.37 MG/DL (ref 0–0.6)
ERYTHROCYTE [DISTWIDTH] IN BLOOD BY AUTOMATED COUNT: 12.5 % (ref 11.5–14.5)
ERYTHROCYTE [SEDIMENTATION RATE] IN BLOOD: 46 MM/HR (ref 0–20)
GLOBULIN SER CALC-MCNC: 3.5 G/DL (ref 2–4)
GLUCOSE BLD STRIP.AUTO-MCNC: 220 MG/DL (ref 65–117)
GLUCOSE BLD STRIP.AUTO-MCNC: 262 MG/DL (ref 65–117)
GLUCOSE BLD STRIP.AUTO-MCNC: 281 MG/DL (ref 65–117)
GLUCOSE BLD STRIP.AUTO-MCNC: 331 MG/DL (ref 65–117)
GLUCOSE BLD STRIP.AUTO-MCNC: 346 MG/DL (ref 65–117)
GLUCOSE BLD STRIP.AUTO-MCNC: 423 MG/DL (ref 65–117)
GLUCOSE SERPL-MCNC: 359 MG/DL (ref 65–100)
HCT VFR BLD AUTO: 43.8 % (ref 36.6–50.3)
HGB BLD-MCNC: 14.8 G/DL (ref 12.1–17)
MCH RBC QN AUTO: 29.4 PG (ref 26–34)
MCHC RBC AUTO-ENTMCNC: 33.8 G/DL (ref 30–36.5)
MCV RBC AUTO: 86.9 FL (ref 80–99)
NRBC # BLD: 0 K/UL (ref 0–0.01)
NRBC BLD-RTO: 0 PER 100 WBC
PERFORMED BY, TECHID: ABNORMAL
PLATELET # BLD AUTO: 255 K/UL (ref 150–400)
PMV BLD AUTO: 10.5 FL (ref 8.9–12.9)
POTASSIUM SERPL-SCNC: 4.3 MMOL/L (ref 3.5–5.1)
PROT SERPL-MCNC: 6.5 G/DL (ref 6.4–8.2)
RBC # BLD AUTO: 5.04 M/UL (ref 4.1–5.7)
SODIUM SERPL-SCNC: 137 MMOL/L (ref 136–145)
WBC # BLD AUTO: 5.4 K/UL (ref 4.1–11.1)

## 2022-05-19 PROCEDURE — 77030031129 HC SUT MCRYL1 J&J -A: Performed by: PODIATRIST

## 2022-05-19 PROCEDURE — 36415 COLL VENOUS BLD VENIPUNCTURE: CPT

## 2022-05-19 PROCEDURE — 74011000258 HC RX REV CODE- 258: Performed by: FAMILY MEDICINE

## 2022-05-19 PROCEDURE — 0Y6Q0Z0 DETACHMENT AT LEFT 1ST TOE, COMPLETE, OPEN APPROACH: ICD-10-PCS | Performed by: PODIATRIST

## 2022-05-19 PROCEDURE — 76210000063 HC OR PH I REC FIRST 0.5 HR: Performed by: PODIATRIST

## 2022-05-19 PROCEDURE — 74011250636 HC RX REV CODE- 250/636: Performed by: FAMILY MEDICINE

## 2022-05-19 PROCEDURE — 85027 COMPLETE CBC AUTOMATED: CPT

## 2022-05-19 PROCEDURE — 74011250637 HC RX REV CODE- 250/637: Performed by: FAMILY MEDICINE

## 2022-05-19 PROCEDURE — 99232 SBSQ HOSP IP/OBS MODERATE 35: CPT | Performed by: INTERNAL MEDICINE

## 2022-05-19 PROCEDURE — 74011250636 HC RX REV CODE- 250/636: Performed by: ANESTHESIOLOGY

## 2022-05-19 PROCEDURE — 82962 GLUCOSE BLOOD TEST: CPT

## 2022-05-19 PROCEDURE — 80053 COMPREHEN METABOLIC PANEL: CPT

## 2022-05-19 PROCEDURE — 74011250637 HC RX REV CODE- 250/637: Performed by: ANESTHESIOLOGY

## 2022-05-19 PROCEDURE — 86140 C-REACTIVE PROTEIN: CPT

## 2022-05-19 PROCEDURE — 2709999900 HC NON-CHARGEABLE SUPPLY: Performed by: PODIATRIST

## 2022-05-19 PROCEDURE — 85652 RBC SED RATE AUTOMATED: CPT

## 2022-05-19 PROCEDURE — 77030002986 HC SUT PROL J&J -A: Performed by: PODIATRIST

## 2022-05-19 PROCEDURE — 76060000032 HC ANESTHESIA 0.5 TO 1 HR: Performed by: PODIATRIST

## 2022-05-19 PROCEDURE — 77030013175 HC SHOE PSTOP DJOR -A: Performed by: PODIATRIST

## 2022-05-19 PROCEDURE — 28825 PARTIAL AMPUTATION OF TOE: CPT | Performed by: PODIATRIST

## 2022-05-19 PROCEDURE — 76010000138 HC OR TIME 0.5 TO 1 HR: Performed by: PODIATRIST

## 2022-05-19 PROCEDURE — 74011636637 HC RX REV CODE- 636/637: Performed by: FAMILY MEDICINE

## 2022-05-19 PROCEDURE — 65270000029 HC RM PRIVATE

## 2022-05-19 RX ORDER — AMOXICILLIN AND CLAVULANATE POTASSIUM 875; 125 MG/1; MG/1
1 TABLET, FILM COATED ORAL 2 TIMES DAILY
Qty: 20 TABLET | Refills: 0 | Status: SHIPPED | OUTPATIENT
Start: 2022-05-19 | End: 2022-06-02

## 2022-05-19 RX ORDER — MIDAZOLAM HYDROCHLORIDE 1 MG/ML
INJECTION, SOLUTION INTRAMUSCULAR; INTRAVENOUS AS NEEDED
Status: DISCONTINUED | OUTPATIENT
Start: 2022-05-19 | End: 2022-05-19 | Stop reason: HOSPADM

## 2022-05-19 RX ORDER — HYDROMORPHONE HYDROCHLORIDE 1 MG/ML
0.4 INJECTION, SOLUTION INTRAMUSCULAR; INTRAVENOUS; SUBCUTANEOUS
Status: DISCONTINUED | OUTPATIENT
Start: 2022-05-19 | End: 2022-05-19 | Stop reason: HOSPADM

## 2022-05-19 RX ORDER — ONDANSETRON 2 MG/ML
INJECTION INTRAMUSCULAR; INTRAVENOUS AS NEEDED
Status: DISCONTINUED | OUTPATIENT
Start: 2022-05-19 | End: 2022-05-19 | Stop reason: HOSPADM

## 2022-05-19 RX ORDER — PROPOFOL 10 MG/ML
INJECTION, EMULSION INTRAVENOUS AS NEEDED
Status: DISCONTINUED | OUTPATIENT
Start: 2022-05-19 | End: 2022-05-19 | Stop reason: HOSPADM

## 2022-05-19 RX ORDER — LIDOCAINE HYDROCHLORIDE 20 MG/ML
INJECTION, SOLUTION EPIDURAL; INFILTRATION; INTRACAUDAL; PERINEURAL AS NEEDED
Status: DISCONTINUED | OUTPATIENT
Start: 2022-05-19 | End: 2022-05-19 | Stop reason: HOSPADM

## 2022-05-19 RX ORDER — DIPHENHYDRAMINE HYDROCHLORIDE 50 MG/ML
12.5 INJECTION, SOLUTION INTRAMUSCULAR; INTRAVENOUS AS NEEDED
Status: DISCONTINUED | OUTPATIENT
Start: 2022-05-19 | End: 2022-05-19 | Stop reason: HOSPADM

## 2022-05-19 RX ORDER — SODIUM CHLORIDE, SODIUM LACTATE, POTASSIUM CHLORIDE, CALCIUM CHLORIDE 600; 310; 30; 20 MG/100ML; MG/100ML; MG/100ML; MG/100ML
INJECTION, SOLUTION INTRAVENOUS
Status: DISCONTINUED | OUTPATIENT
Start: 2022-05-19 | End: 2022-05-19 | Stop reason: HOSPADM

## 2022-05-19 RX ORDER — MORPHINE SULFATE 10 MG/ML
2 INJECTION, SOLUTION INTRAMUSCULAR; INTRAVENOUS
Status: DISCONTINUED | OUTPATIENT
Start: 2022-05-19 | End: 2022-05-19 | Stop reason: HOSPADM

## 2022-05-19 RX ORDER — MIDAZOLAM HYDROCHLORIDE 1 MG/ML
0.5 INJECTION, SOLUTION INTRAMUSCULAR; INTRAVENOUS
Status: DISCONTINUED | OUTPATIENT
Start: 2022-05-19 | End: 2022-05-19 | Stop reason: HOSPADM

## 2022-05-19 RX ORDER — SODIUM CHLORIDE 0.9 % (FLUSH) 0.9 %
5-40 SYRINGE (ML) INJECTION AS NEEDED
Status: CANCELLED | OUTPATIENT
Start: 2022-05-19

## 2022-05-19 RX ORDER — FENTANYL CITRATE 50 UG/ML
INJECTION, SOLUTION INTRAMUSCULAR; INTRAVENOUS AS NEEDED
Status: DISCONTINUED | OUTPATIENT
Start: 2022-05-19 | End: 2022-05-19 | Stop reason: HOSPADM

## 2022-05-19 RX ORDER — SODIUM CHLORIDE 0.9 % (FLUSH) 0.9 %
5-40 SYRINGE (ML) INJECTION EVERY 8 HOURS
Status: CANCELLED | OUTPATIENT
Start: 2022-05-19

## 2022-05-19 RX ORDER — SODIUM CHLORIDE 0.9 % (FLUSH) 0.9 %
5-40 SYRINGE (ML) INJECTION AS NEEDED
Status: DISCONTINUED | OUTPATIENT
Start: 2022-05-19 | End: 2022-05-19 | Stop reason: HOSPADM

## 2022-05-19 RX ORDER — INSULIN GLARGINE 100 [IU]/ML
INJECTION, SOLUTION SUBCUTANEOUS
Qty: 1 ML | Refills: 1 | Status: SHIPPED | OUTPATIENT
Start: 2022-05-19

## 2022-05-19 RX ORDER — DEXAMETHASONE SODIUM PHOSPHATE 4 MG/ML
INJECTION, SOLUTION INTRA-ARTICULAR; INTRALESIONAL; INTRAMUSCULAR; INTRAVENOUS; SOFT TISSUE AS NEEDED
Status: DISCONTINUED | OUTPATIENT
Start: 2022-05-19 | End: 2022-05-19 | Stop reason: HOSPADM

## 2022-05-19 RX ORDER — MIDAZOLAM HYDROCHLORIDE 1 MG/ML
1 INJECTION, SOLUTION INTRAMUSCULAR; INTRAVENOUS AS NEEDED
Status: CANCELLED | OUTPATIENT
Start: 2022-05-19

## 2022-05-19 RX ORDER — LABETALOL HCL 20 MG/4 ML
5 SYRINGE (ML) INTRAVENOUS
Status: DISCONTINUED | OUTPATIENT
Start: 2022-05-19 | End: 2022-05-19 | Stop reason: HOSPADM

## 2022-05-19 RX ORDER — SODIUM CHLORIDE 0.9 % (FLUSH) 0.9 %
5-40 SYRINGE (ML) INJECTION EVERY 8 HOURS
Status: DISCONTINUED | OUTPATIENT
Start: 2022-05-19 | End: 2022-05-19 | Stop reason: HOSPADM

## 2022-05-19 RX ORDER — METOPROLOL TARTRATE 5 MG/5ML
2.5 INJECTION INTRAVENOUS
Status: DISCONTINUED | OUTPATIENT
Start: 2022-05-19 | End: 2022-05-19 | Stop reason: HOSPADM

## 2022-05-19 RX ORDER — OXYCODONE AND ACETAMINOPHEN 5; 325 MG/1; MG/1
1 TABLET ORAL AS NEEDED
Status: DISCONTINUED | OUTPATIENT
Start: 2022-05-19 | End: 2022-05-19 | Stop reason: HOSPADM

## 2022-05-19 RX ORDER — FENTANYL CITRATE 50 UG/ML
50 INJECTION, SOLUTION INTRAMUSCULAR; INTRAVENOUS AS NEEDED
Status: CANCELLED | OUTPATIENT
Start: 2022-05-19

## 2022-05-19 RX ORDER — INSULIN GLARGINE 100 [IU]/ML
15 INJECTION, SOLUTION SUBCUTANEOUS 2 TIMES DAILY
Status: DISCONTINUED | OUTPATIENT
Start: 2022-05-19 | End: 2022-05-20 | Stop reason: HOSPADM

## 2022-05-19 RX ORDER — NORETHINDRONE AND ETHINYL ESTRADIOL 0.5-0.035
5 KIT ORAL AS NEEDED
Status: DISCONTINUED | OUTPATIENT
Start: 2022-05-19 | End: 2022-05-19 | Stop reason: HOSPADM

## 2022-05-19 RX ORDER — ONDANSETRON 2 MG/ML
4 INJECTION INTRAMUSCULAR; INTRAVENOUS AS NEEDED
Status: DISCONTINUED | OUTPATIENT
Start: 2022-05-19 | End: 2022-05-19 | Stop reason: HOSPADM

## 2022-05-19 RX ORDER — LIDOCAINE HYDROCHLORIDE 10 MG/ML
0.1 INJECTION, SOLUTION EPIDURAL; INFILTRATION; INTRACAUDAL; PERINEURAL AS NEEDED
Status: CANCELLED | OUTPATIENT
Start: 2022-05-19

## 2022-05-19 RX ORDER — FENTANYL CITRATE 50 UG/ML
50 INJECTION, SOLUTION INTRAMUSCULAR; INTRAVENOUS
Status: DISCONTINUED | OUTPATIENT
Start: 2022-05-19 | End: 2022-05-19 | Stop reason: HOSPADM

## 2022-05-19 RX ORDER — HYDRALAZINE HYDROCHLORIDE 20 MG/ML
10 INJECTION INTRAMUSCULAR; INTRAVENOUS
Status: DISCONTINUED | OUTPATIENT
Start: 2022-05-19 | End: 2022-05-19 | Stop reason: HOSPADM

## 2022-05-19 RX ADMIN — METOPROLOL TARTRATE 100 MG: 50 TABLET, FILM COATED ORAL at 14:29

## 2022-05-19 RX ADMIN — INSULIN LISPRO 18 UNITS: 100 INJECTION, SOLUTION INTRAVENOUS; SUBCUTANEOUS at 16:30

## 2022-05-19 RX ADMIN — PIPERACILLIN AND TAZOBACTAM 3.38 G: 3; .375 INJECTION, POWDER, LYOPHILIZED, FOR SOLUTION INTRAVENOUS at 14:30

## 2022-05-19 RX ADMIN — FENTANYL CITRATE 25 MCG: 50 INJECTION, SOLUTION INTRAMUSCULAR; INTRAVENOUS at 12:20

## 2022-05-19 RX ADMIN — SODIUM CHLORIDE 75 ML/HR: 9 INJECTION, SOLUTION INTRAVENOUS at 06:38

## 2022-05-19 RX ADMIN — FENTANYL CITRATE 50 MCG: 50 INJECTION, SOLUTION INTRAMUSCULAR; INTRAVENOUS at 12:52

## 2022-05-19 RX ADMIN — ENOXAPARIN SODIUM 40 MG: 100 INJECTION SUBCUTANEOUS at 14:29

## 2022-05-19 RX ADMIN — ONDANSETRON 4 MG: 2 INJECTION INTRAMUSCULAR; INTRAVENOUS at 12:06

## 2022-05-19 RX ADMIN — INSULIN LISPRO 15 UNITS: 100 INJECTION, SOLUTION INTRAVENOUS; SUBCUTANEOUS at 07:30

## 2022-05-19 RX ADMIN — PIPERACILLIN AND TAZOBACTAM 3.38 G: 3; .375 INJECTION, POWDER, LYOPHILIZED, FOR SOLUTION INTRAVENOUS at 06:37

## 2022-05-19 RX ADMIN — FENTANYL CITRATE 25 MCG: 50 INJECTION, SOLUTION INTRAMUSCULAR; INTRAVENOUS at 12:15

## 2022-05-19 RX ADMIN — LISINOPRIL 5 MG: 5 TABLET ORAL at 14:29

## 2022-05-19 RX ADMIN — OXYCODONE AND ACETAMINOPHEN 1 TABLET: 5; 325 TABLET ORAL at 13:06

## 2022-05-19 RX ADMIN — INSULIN LISPRO 12 UNITS: 100 INJECTION, SOLUTION INTRAVENOUS; SUBCUTANEOUS at 17:36

## 2022-05-19 RX ADMIN — INSULIN LISPRO 7 UNITS: 100 INJECTION, SOLUTION INTRAVENOUS; SUBCUTANEOUS at 12:58

## 2022-05-19 RX ADMIN — DEXAMETHASONE SODIUM PHOSPHATE 8 MG: 4 INJECTION, SOLUTION INTRA-ARTICULAR; INTRALESIONAL; INTRAMUSCULAR; INTRAVENOUS; SOFT TISSUE at 12:06

## 2022-05-19 RX ADMIN — PROPOFOL 200 MG: 10 INJECTION, EMULSION INTRAVENOUS at 12:08

## 2022-05-19 RX ADMIN — LIDOCAINE HYDROCHLORIDE 100 MG: 20 INJECTION, SOLUTION EPIDURAL; INFILTRATION; INTRACAUDAL; PERINEURAL at 12:08

## 2022-05-19 RX ADMIN — ASPIRIN 81 MG: 81 TABLET, COATED ORAL at 14:29

## 2022-05-19 RX ADMIN — PANTOPRAZOLE SODIUM 40 MG: 40 TABLET, DELAYED RELEASE ORAL at 14:29

## 2022-05-19 RX ADMIN — MIDAZOLAM HYDROCHLORIDE 2 MG: 1 INJECTION, SOLUTION INTRAMUSCULAR; INTRAVENOUS at 12:00

## 2022-05-19 RX ADMIN — ATORVASTATIN CALCIUM 20 MG: 20 TABLET, FILM COATED ORAL at 14:30

## 2022-05-19 RX ADMIN — SODIUM CHLORIDE, SODIUM LACTATE, POTASSIUM CHLORIDE, CALCIUM CHLORIDE: 600; 310; 30; 20 INJECTION, SOLUTION INTRAVENOUS at 12:01

## 2022-05-19 RX ADMIN — INSULIN GLARGINE 15 UNITS: 100 INJECTION, SOLUTION SUBCUTANEOUS at 10:00

## 2022-05-19 RX ADMIN — FENTANYL CITRATE 50 MCG: 50 INJECTION, SOLUTION INTRAMUSCULAR; INTRAVENOUS at 12:08

## 2022-05-19 RX ADMIN — FENTANYL CITRATE 50 MCG: 50 INJECTION, SOLUTION INTRAMUSCULAR; INTRAVENOUS at 12:44

## 2022-05-19 NOTE — PROGRESS NOTES
Problem: Falls - Risk of  Goal: *Absence of Falls  Description: Document Kp Joshi Fall Risk and appropriate interventions in the flowsheet. Outcome: Progressing Towards Goal  Note: Fall Risk Interventions:            Medication Interventions: Bed/chair exit alarm    Elimination Interventions: Call light in reach              Problem: Patient Education: Go to Patient Education Activity  Goal: Patient/Family Education  Outcome: Progressing Towards Goal     Problem: Discharge Planning  Goal: *Knowledge of medication management  Outcome: Progressing Towards Goal     Problem: Patient Education: Go to Patient Education Activity  Goal: Patient/Family Education  Outcome: Progressing Towards Goal     Problem: Diabetes Self-Management  Goal: *Disease process and treatment process  Description: Define diabetes and identify own type of diabetes; list 3 options for treating diabetes. Outcome: Progressing Towards Goal  Goal: *Incorporating nutritional management into lifestyle  Description: Describe effect of type, amount and timing of food on blood glucose; list 3 methods for planning meals. Outcome: Progressing Towards Goal  Goal: *Incorporating physical activity into lifestyle  Description: State effect of exercise on blood glucose levels. Outcome: Progressing Towards Goal  Goal: *Developing strategies to promote health/change behavior  Description: Define the ABC's of diabetes; identify appropriate screenings, schedule and personal plan for screenings. Outcome: Progressing Towards Goal  Goal: *Using medications safely  Description: State effect of diabetes medications on diabetes; name diabetes medication taking, action and side effects. Outcome: Progressing Towards Goal  Goal: *Monitoring blood glucose, interpreting and using results  Description: Identify recommended blood glucose targets  and personal targets.   Outcome: Progressing Towards Goal  Goal: *Prevention, detection, treatment of acute complications  Description: List symptoms of hyper- and hypoglycemia; describe how to treat low blood sugar and actions for lowering  high blood glucose level. Outcome: Progressing Towards Goal  Goal: *Prevention, detection and treatment of chronic complications  Description: Define the natural course of diabetes and describe the relationship of blood glucose levels to long term complications of diabetes.   Outcome: Progressing Towards Goal  Goal: *Developing strategies to address psychosocial issues  Description: Describe feelings about living with diabetes; identify support needed and support network  Outcome: Progressing Towards Goal  Goal: *Insulin pump training  Outcome: Progressing Towards Goal  Goal: *Patient Specific Goal (EDIT GOAL, INSERT TEXT)  Outcome: Progressing Towards Goal     Problem: Patient Education: Go to Patient Education Activity  Goal: Patient/Family Education  Outcome: Progressing Towards Goal

## 2022-05-19 NOTE — PROGRESS NOTES
Progress Note    Patient: Jenna Yeager MRN: [id number]  SSN: [id number]    YOB: 1960  Age: 64 y.o. Sex: male      Admit Date: 5/17/2022    LOS: 2 days     Subjective:   Patient followed for left diabetic foot infection with osteomyelitis left great toe distal phalanx with planned amputation per Podiatry. Wound cultures still pending. He is afebrile with normal WBC and decreasing CRP. Currently on IV Zosyn alone. Patient is off the floor at this time, presumably in the OR. Objective:     Vitals:    05/18/22 0740 05/18/22 1605 05/18/22 1945 05/19/22 0157   BP: 125/79 121/78 124/76 131/87   Pulse: 85 92 87 79   Resp: 20 18 20 18   Temp: 98.2 °F (36.8 °C) 97.7 °F (36.5 °C) 98.7 °F (37.1 °C) 98.4 °F (36.9 °C)   SpO2: 98% 96% 97% 99%   Weight:       Height:            Intake and Output:  Current Shift: No intake/output data recorded. Last three shifts: 05/17 1901 - 05/19 0700  In: 3100 [P.O.:1300; I.V.:1800]  Out: -     Physical Exam:    Vitals and nursing note reviewed. Constitutional:       Appearance: He is obese. He is ill-appearing. Genitourinary:     Comments: No George  Musculoskeletal:         General: Swelling present. Right lower leg: No edema. Left lower leg: Edema present. Feet:    Skin:     Findings: No rash. Neurological:      General: No focal deficit present. Mental Status: He is oriented to person, place, and time. Psychiatric:         Mood and Affect: Mood normal.         Behavior: Behavior normal.         Thought Content: Thought content normal.         Judgment: Judgment normal.     Lab/Data Review:     WBC 5,400    ESR 56 <46  CRP 1.37 <2.15 <1.39    Blood cultures (5/17) No growth 1 day  Blood cultures (5/17) No growth 1 day  Wound culture left great toe (5/17) Mixed enteric Gram negative rods  Wound culture left great toe (5/17) Pending    Assessment:     Active Problems:    Osteomyelitis ([address] (5/17/2022)    1.  Diabetic foot infection, left great toe, culture pending, Day #3 IV Zosyn  2. Osteomyelitis, left first distal phalanx, secondary to #, Day #3 IV Zosyn  3. Elevated CRP and ESR secondary to above  4. Uncontrolled diabetes mellitus  5. Pending surgery    Comment:  CRP decreasing on Zosyn. Plan:     1. Continue IV Zosyn pending surgical intervention; at discharge transition to Augmentin 875 mg po BID for 2 weeks  2. In am, repeat ESR and CRP  3.  Follow-up blood and wound cultures       Signed By: Farhad Hawkins MD     May 19, 2022

## 2022-05-19 NOTE — ANESTHESIA PREPROCEDURE EVALUATION
Relevant Problems   No relevant active problems       Anesthetic History   No history of anesthetic complications            Review of Systems / Medical History  Patient summary reviewed, nursing notes reviewed and pertinent labs reviewed    Pulmonary  Within defined limits                 Neuro/Psych   Within defined limits           Cardiovascular    Hypertension        Dysrhythmias : atrial fibrillation  CAD and hyperlipidemia         GI/Hepatic/Renal     GERD           Endo/Other    Diabetes    Obesity     Other Findings            Past Medical History:   Diagnosis Date    Arrhythmia     Hx of AFIB    CAD (coronary artery disease)     Diabetes ([address]     Diverticulosis     Dysphagia     GERD (gastroesophageal reflux disease)     Hx of hemorrhoids     Hypercholesterolemia     Hypertension        Past Surgical History:   Procedure Laterality Date    HX COLONOSCOPY      HX ORTHOPAEDIC Left     wrist     HX SHOULDER ARTHROSCOPY Right 06/09/2021    [place] CARDIAC SURG PROCEDURE UNLIST      Atrial fibrilation        Current Outpatient Medications   Medication Instructions    amoxicillin-clavulanate (Augmentin) 875-125 mg per tablet 1 Tablet, Oral, 2 TIMES DAILY    aspirin delayed-release 81 mg, Oral, DAILY    atorvastatin (LIPITOR) 20 mg, Oral, DAILY    empagliflozin (JARDIANCE) 10 mg, Oral, DAILY    ibuprofen 200 mg, Oral, EVERY 6 HOURS AS NEEDED    lisinopriL (PRINIVIL, ZESTRIL) 5 mg, Oral, DAILY    metoprolol tartrate (LOPRESSOR) 100 mg, Oral, 2 TIMES DAILY    omeprazole (PRILOSEC) 40 mg, Oral, DAILY       Current Facility-Administered Medications   Medication Dose Route Frequency    insulin glargine (LANTUS) injection 15 Units  15 Units SubCUTAneous BID    aspirin delayed-release tablet 81 mg  81 mg Oral DAILY    atorvastatin (LIPITOR) tablet 20 mg  20 mg Oral DAILY    lisinopriL (PRINIVIL, ZESTRIL) tablet 5 mg  5 mg Oral DAILY    metoprolol tartrate (LOPRESSOR) tablet 100 mg  100 mg Oral BID  pantoprazole (PROTONIX) tablet 40 mg  40 mg Oral DAILY    insulin lispro (HUMALOG) injection   SubCUTAneous AC&HS    glucose chewable tablet 16 g  4 Tablet Oral PRN    glucagon (GLUCAGEN) injection 1 mg  1 mg IntraMUSCular PRN    0.9% sodium chloride infusion  75 mL/hr IntraVENous CONTINUOUS    acetaminophen (TYLENOL) tablet 650 mg  650 mg Oral Q6H PRN    Or    acetaminophen (TYLENOL) suppository 650 mg  650 mg Rectal Q6H PRN    polyethylene glycol (MIRALAX) packet 17 g  17 g Oral DAILY PRN    ondansetron (ZOFRAN ODT) tablet 4 mg  4 mg Oral Q8H PRN    Or    ondansetron (ZOFRAN) injection 4 mg  4 mg IntraVENous Q6H PRN    enoxaparin (LOVENOX) injection 40 mg  40 mg SubCUTAneous DAILY    piperacillin-tazobactam (ZOSYN) 3.375 g in 0.9% sodium chloride (MBP/ADV) 100 mL MBP  3.375 g IntraVENous Q8H       Patient Vitals for the past 24 hrs:   Temp Pulse Resp BP SpO2   05/19/22 0904 36.7 °C (98 °F) 91 16 108/69 99 %   05/19/22 0157 36.9 °C (98.4 °F) 79 18 131/87 99 %   05/18/22 1945 37.1 °C (98.7 °F) 87 20 124/76 97 %   05/18/22 1605 36.5 °C (97.7 °F) 92 18 121/78 96 %       Lab Results   Component Value Date/Time    WBC 5.4 05/19/2022 07:32 AM    HGB 14.8 05/19/2022 07:32 AM    HCT 43.8 05/19/2022 07:32 AM    PLATELET 868 05/35/9806 07:32 AM    MCV 86.9 05/19/2022 07:32 AM     Lab Results   Component Value Date/Time    Sodium 137 05/19/2022 07:32 AM    Potassium 4.3 05/19/2022 07:32 AM    Chloride 104 05/19/2022 07:32 AM    CO2 27 05/19/2022 07:32 AM    Anion gap 6 05/19/2022 07:32 AM    Glucose 359 (H) 05/19/2022 07:32 AM    BUN 12 05/19/2022 07:32 AM    Creatinine 0.74 05/19/2022 07:32 AM    BUN/Creatinine ratio 16 05/19/2022 07:32 AM    GFR est AA >60 05/19/2022 07:32 AM    GFR est non-AA >60 05/19/2022 07:32 AM    Calcium 9.6 05/19/2022 07:32 AM     No results found for: APTT, PTP, INR, INREXT  Lab Results   Component Value Date/Time    Glucose 359 (H) 05/19/2022 07:32 AM    Glucose (POC) 346 (H) 05/19/2022 08:55 AM     Physical Exam    Airway  Mallampati: II  TM Distance: 4 - 6 cm  Neck ROM: normal range of motion   Mouth opening: Normal     Cardiovascular    Rhythm: regular  Rate: normal         Dental         Pulmonary  Breath sounds clear to auscultation               Abdominal  GI exam deferred       Other Findings            Anesthetic Plan    ASA: 3  Anesthesia type: general          Induction: Intravenous  Anesthetic plan and risks discussed with: Patient and Family

## 2022-05-19 NOTE — PROGRESS NOTES
Problem: Falls - Risk of  Goal: *Absence of Falls  Description: Document James Sites Fall Risk and appropriate interventions in the flowsheet. Outcome: Progressing Towards Goal  Note: Fall Risk Interventions:            Medication Interventions: Patient to call before getting OOB    Elimination Interventions: Call light in reach              Problem: Patient Education: Go to Patient Education Activity  Goal: Patient/Family Education  Outcome: Progressing Towards Goal     Problem: Discharge Planning  Goal: *Discharge to safe environment  Outcome: Progressing Towards Goal  Goal: *Knowledge of medication management  Outcome: Progressing Towards Goal  Goal: *Knowledge of discharge instructions  Outcome: Progressing Towards Goal     Problem: Patient Education: Go to Patient Education Activity  Goal: Patient/Family Education  Outcome: Progressing Towards Goal     Problem: Diabetes Self-Management  Goal: *Disease process and treatment process  Description: Define diabetes and identify own type of diabetes; list 3 options for treating diabetes. Outcome: Progressing Towards Goal  Goal: *Incorporating nutritional management into lifestyle  Description: Describe effect of type, amount and timing of food on blood glucose; list 3 methods for planning meals. Outcome: Progressing Towards Goal  Goal: *Incorporating physical activity into lifestyle  Description: State effect of exercise on blood glucose levels. Outcome: Progressing Towards Goal  Goal: *Developing strategies to promote health/change behavior  Description: Define the ABC's of diabetes; identify appropriate screenings, schedule and personal plan for screenings. Outcome: Progressing Towards Goal  Goal: *Using medications safely  Description: State effect of diabetes medications on diabetes; name diabetes medication taking, action and side effects.   Outcome: Progressing Towards Goal  Goal: *Monitoring blood glucose, interpreting and using results  Description: Identify recommended blood glucose targets  and personal targets. Outcome: Progressing Towards Goal  Goal: *Prevention, detection, treatment of acute complications  Description: List symptoms of hyper- and hypoglycemia; describe how to treat low blood sugar and actions for lowering  high blood glucose level. Outcome: Progressing Towards Goal  Goal: *Prevention, detection and treatment of chronic complications  Description: Define the natural course of diabetes and describe the relationship of blood glucose levels to long term complications of diabetes.   Outcome: Progressing Towards Goal  Goal: *Developing strategies to address psychosocial issues  Description: Describe feelings about living with diabetes; identify support needed and support network  Outcome: Progressing Towards Goal  Goal: *Insulin pump training  Outcome: Progressing Towards Goal  Goal: *Sick day guidelines  Outcome: Progressing Towards Goal  Goal: *Patient Specific Goal (EDIT GOAL, INSERT TEXT)  Outcome: Progressing Towards Goal     Problem: Patient Education: Go to Patient Education Activity  Goal: Patient/Family Education  Outcome: Progressing Towards Goal

## 2022-05-19 NOTE — PROGRESS NOTES
General Daily Progress Note          Patient Name:   Tc Villalba       YOB: 1960       Age:  64 y.o.       Admit Date: 5/17/2022      Subjective:       Patient is a 64y.o. year old male history of diabetes hypertension came to emergency room because of left great toe infection patient was sent by the podiatrist concerned about osteomyelitis seen by the ER physician MRI done shows osteomyelitis of the left distal great toe       Resting in  the bed alert awake not in distress    Surgery was canceled yesterday/    Objective:     Visit Vitals  /87 (BP 1 Location: Left upper arm, BP Patient Position: Semi fowlers)   Pulse 79   Temp 98.4 °F (36.9 °C)   Resp 18   Ht 6' 2\" (1.88 m)   Wt 112 kg (247 lb)   SpO2 99%   BMI 31.71 kg/m²        Recent Results (from the past 24 hour(s))   GLUCOSE, POC    Collection Time: 05/18/22  9:12 AM   Result Value Ref Range    Glucose (POC) 336 (H) 65 - 117 mg/dL    Performed by Gwen Londono RN (Traveler)    GLUCOSE, POC    Collection Time: 05/18/22 11:45 AM   Result Value Ref Range    Glucose (POC) 354 (H) 65 - 117 mg/dL    Performed by Priscilla Laurent    GLUCOSE, POC    Collection Time: 05/18/22 12:11 PM   Result Value Ref Range    Glucose (POC) 351 (H) 65 - 117 mg/dL    Performed by Gemma 68, POC    Collection Time: 05/18/22  2:18 PM   Result Value Ref Range    Glucose (POC) 397 (H) 65 - 117 mg/dL    Performed by Gwen Londono RN (Traveler)    GLUCOSE, POC    Collection Time: 05/18/22  3:01 PM   Result Value Ref Range    Glucose (POC) 381 (H) 65 - 117 mg/dL    Performed by Deslys Pencil, POC    Collection Time: 05/18/22  7:46 PM   Result Value Ref Range    Glucose (POC) 427 (H) 65 - 117 mg/dL    Performed by Micky Coleman    GLUCOSE, POC    Collection Time: 05/19/22 12:16 AM   Result Value Ref Range    Glucose (POC) 281 (H) 65 - 117 mg/dL    Performed by Micky Coleman    CBC W/O DIFF    Collection Time: 05/19/22  7:32 AM   Result Value Ref Range    WBC 5.4 4.1 - 11.1 K/uL    RBC 5.04 4.10 - 5.70 M/uL    HGB 14.8 12.1 - 17.0 g/dL    HCT 43.8 36.6 - 50.3 %    MCV 86.9 80.0 - 99.0 FL    MCH 29.4 26.0 - 34.0 PG    MCHC 33.8 30.0 - 36.5 g/dL    RDW 12.5 11.5 - 14.5 %    PLATELET 281 625 - 461 K/uL    MPV 10.5 8.9 - 12.9 FL    NRBC 0.0 0.0  WBC    ABSOLUTE NRBC 0.00 0.00 - 9.42 K/uL   METABOLIC PANEL, COMPREHENSIVE    Collection Time: 05/19/22  7:32 AM   Result Value Ref Range    Sodium 137 136 - 145 mmol/L    Potassium 4.3 3.5 - 5.1 mmol/L    Chloride 104 97 - 108 mmol/L    CO2 27 21 - 32 mmol/L    Anion gap 6 5 - 15 mmol/L    Glucose 359 (H) 65 - 100 mg/dL    BUN 12 6 - 20 mg/dL    Creatinine 0.74 0.70 - 1.30 mg/dL    BUN/Creatinine ratio 16 12 - 20      GFR est AA >60 >60 ml/min/1.73m2    GFR est non-AA >60 >60 ml/min/1.73m2    Calcium 9.6 8.5 - 10.1 mg/dL    Bilirubin, total 0.4 0.2 - 1.0 mg/dL    AST (SGOT) 20 15 - 37 U/L    ALT (SGPT) 36 12 - 78 U/L    Alk. phosphatase 107 45 - 117 U/L    Protein, total 6.5 6.4 - 8.2 g/dL    Albumin 3.0 (L) 3.5 - 5.0 g/dL    Globulin 3.5 2.0 - 4.0 g/dL    A-G Ratio 0.9 (L) 1.1 - 2.2     C REACTIVE PROTEIN, QT    Collection Time: 05/19/22  7:32 AM   Result Value Ref Range    C-Reactive protein 1.37 (H) 0.00 - 0.60 mg/dL     [unfilled]      Review of Systems    Constitutional: Negative for chills and fever. HENT: Negative. Eyes: Negative. Respiratory: Negative. Cardiovascular: Negative. Gastrointestinal: Negative for abdominal pain and nausea. Skin: Negative. Neurological: Negative. Physical Exam:      Constitutional: pt is oriented to person, place, and time. HENT:   Head: Normocephalic and atraumatic. Eyes: Pupils are equal, round, and reactive to light. EOM are normal.   Cardiovascular: Normal rate, regular rhythm and normal heart sounds. Pulmonary/Chest: Breath sounds normal. No wheezes. No rales. Exhibits no tenderness. Abdominal: Soft.  Bowel sounds are normal. There is no abdominal tenderness. There is no rebound and no guarding. Musculoskeletal: Normal range of motion. Neurological: pt is alert and oriented to person, place, and time.      MRI FOOT LT WO CONT   Final Result   Osteomyelitis of the first distal phalanx           Recent Results (from the past 24 hour(s))   GLUCOSE, POC    Collection Time: 05/18/22  9:12 AM   Result Value Ref Range    Glucose (POC) 336 (H) 65 - 117 mg/dL    Performed by Rissa Coffey RN (Traveler)    GLUCOSE, POC    Collection Time: 05/18/22 11:45 AM   Result Value Ref Range    Glucose (POC) 354 (H) 65 - 117 mg/dL    Performed by Crypteia Networkscayla Crooks    GLUCOSE, POC    Collection Time: 05/18/22 12:11 PM   Result Value Ref Range    Glucose (POC) 351 (H) 65 - 117 mg/dL    Performed by Gemma 68, POC    Collection Time: 05/18/22  2:18 PM   Result Value Ref Range    Glucose (POC) 397 (H) 65 - 117 mg/dL    Performed by Rissa Coffey RN (Traveler)    GLUCOSE, POC    Collection Time: 05/18/22  3:01 PM   Result Value Ref Range    Glucose (POC) 381 (H) 65 - 117 mg/dL    Performed by [employer]cayla Crooks    GLUCOSE, POC    Collection Time: 05/18/22  7:46 PM   Result Value Ref Range    Glucose (POC) 427 (H) 65 - 117 mg/dL    Performed by Tomeka Simmons    GLUCOSE, POC    Collection Time: 05/19/22 12:16 AM   Result Value Ref Range    Glucose (POC) 281 (H) 65 - 117 mg/dL    Performed by Tomeka Simmons    CBC W/O DIFF    Collection Time: 05/19/22  7:32 AM   Result Value Ref Range    WBC 5.4 4.1 - 11.1 K/uL    RBC 5.04 4.10 - 5.70 M/uL    HGB 14.8 12.1 - 17.0 g/dL    HCT 43.8 36.6 - 50.3 %    MCV 86.9 80.0 - 99.0 FL    MCH 29.4 26.0 - 34.0 PG    MCHC 33.8 30.0 - 36.5 g/dL    RDW 12.5 11.5 - 14.5 %    PLATELET 582 077 - 174 K/uL    MPV 10.5 8.9 - 12.9 FL    NRBC 0.0 0.0  WBC    ABSOLUTE NRBC 0.00 0.00 - 7.44 K/uL   METABOLIC PANEL, COMPREHENSIVE    Collection Time: 05/19/22  7:32 AM   Result Value Ref Range    Sodium 137 136 - 145 mmol/L Potassium 4.3 3.5 - 5.1 mmol/L    Chloride 104 97 - 108 mmol/L    CO2 27 21 - 32 mmol/L    Anion gap 6 5 - 15 mmol/L    Glucose 359 (H) 65 - 100 mg/dL    BUN 12 6 - 20 mg/dL    Creatinine 0.74 0.70 - 1.30 mg/dL    BUN/Creatinine ratio 16 12 - 20      GFR est AA >60 >60 ml/min/1.73m2    GFR est non-AA >60 >60 ml/min/1.73m2    Calcium 9.6 8.5 - 10.1 mg/dL    Bilirubin, total 0.4 0.2 - 1.0 mg/dL    AST (SGOT) 20 15 - 37 U/L    ALT (SGPT) 36 12 - 78 U/L    Alk.  phosphatase 107 45 - 117 U/L    Protein, total 6.5 6.4 - 8.2 g/dL    Albumin 3.0 (L) 3.5 - 5.0 g/dL    Globulin 3.5 2.0 - 4.0 g/dL    A-G Ratio 0.9 (L) 1.1 - 2.2     C REACTIVE PROTEIN, QT    Collection Time: 05/19/22  7:32 AM   Result Value Ref Range    C-Reactive protein 1.37 (H) 0.00 - 0.60 mg/dL       Results     Procedure Component Value Units Date/Time    CULTURE, BLOOD #2 [[id number]] Collected: 05/17/22 2029    Order Status: Completed Specimen: Blood Updated: 05/19/22 0748     Special Requests: --        No Special Requests  Right       Culture result: No growth 1 day       CULTURE, BLOOD #1 [[id number]] Collected: 05/17/22 2015    Order Status: Completed Specimen: Blood Updated: 05/19/22 0748     Special Requests: --        No Special Requests  Left       Culture result: No growth 1 day       CULTURE, Stacey Reap STAIN [[id number]] Collected: 05/17/22 1920    Order Status: Completed Specimen: Wound from Great Toe Updated: 05/19/22 0808     Special Requests: No Special Requests        GRAM STAIN No wbc's seen         No organisms seen        Culture result: No growth thus far       CULTURE, Stacey Reap STAIN [[id number]] Collected: 05/17/22 0826    Order Status: Completed Specimen: Wound Updated: 05/18/22 1426     Special Requests: No Special Requests        GRAM STAIN Occasional WBCs seen         No organisms seen        Culture result: Light  MIXED ENTERIC GRAM NEGTIVE RODS         Few  Mixed skin hao isolated       CULTURE, BLOOD, PAIRED [[id number]] Collected: 05/11/22 1115    Order Status: Completed Specimen: Blood Updated: 05/17/22 0804     Special Requests: No Special Requests        Culture result: No growth 6 days              Labs:     Recent Labs     05/19/22  0732 05/18/22  0645   WBC 5.4 6.1   HGB 14.8 13.9   HCT 43.8 41.0    245     Recent Labs     05/19/22  0732 05/18/22  0645 05/17/22  0730    135* 133*   K 4.3 3.9 4.4    102 100   CO2 27 26 25   BUN 12 12 12   CREA 0.74 0.68* 0.93   * 319* 491*   CA 9.6 9.2 9.9     Recent Labs     05/19/22  0732 05/18/22  0645   ALT 36 36    102   TBILI 0.4 0.5   TP 6.5 6.4   ALB 3.0* 3.1*   GLOB 3.5 3.3     Recent Labs     05/17/22  0730   INR 1.0   PTP 12.9      No results for input(s): FE, TIBC, PSAT, FERR in the last 72 hours. No results found for: FOL, RBCF   No results for input(s): PH, PCO2, PO2 in the last 72 hours. No results for input(s): CPK, CKNDX, TROIQ in the last 72 hours.     No lab exists for component: CPKMB  Lab Results   Component Value Date/Time    Cholesterol, total 212 (H) 07/13/2021 03:35 PM    HDL Cholesterol 68 07/13/2021 03:35 PM    LDL, calculated 95 07/13/2021 03:35 PM    Triglyceride 296 (H) 07/13/2021 03:35 PM     Lab Results   Component Value Date/Time    Glucose (POC) 281 (H) 05/19/2022 12:16 AM    Glucose (POC) 427 (H) 05/18/2022 07:46 PM    Glucose (POC) 381 (H) 05/18/2022 03:01 PM    Glucose (POC) 397 (H) 05/18/2022 02:18 PM    Glucose (POC) 351 (H) 05/18/2022 12:11 PM     Lab Results   Component Value Date/Time    Color Yellow/Straw 06/22/2021 11:34 AM    Appearance Clear 06/22/2021 11:34 AM    Specific gravity 1.029 06/22/2021 11:34 AM    pH (UA) 5.0 06/22/2021 11:34 AM    Protein Negative 06/22/2021 11:34 AM    Glucose >300 (A) 06/22/2021 11:34 AM    Ketone 5 (A) 06/22/2021 11:34 AM    Bilirubin Negative 06/22/2021 11:34 AM    Urobilinogen 0.1 06/22/2021 11:34 AM    Nitrites Negative 06/22/2021 11:34 AM    Leukocyte Esterase Negative 06/22/2021 11:34 AM    Bacteria Negative 06/22/2021 11:34 AM    WBC 0-5 06/22/2021 11:34 AM    RBC 0-5 06/22/2021 11:34 AM         Assessment:     Osteomyelitis of the left great toe distal phalanx  Uncontrolled diabetes  Hypertension  GERD      Plan:       Continue IV Zosyn  Vancomycin discontinued by the infectious disease  Seen by the podiatrist plan for partial amputation of the left great toe  Increase Lantus 15 units subcu twice a day    Plan for surgery today at noon      Current Facility-Administered Medications:     insulin glargine (LANTUS) injection 15 Units, 15 Units, SubCUTAneous, DAILY, Rebecca Salgado MD    aspirin delayed-release tablet 81 mg, 81 mg, Oral, DAILY, Shanika Carvalho MD, 81 mg at 05/18/22 4700    atorvastatin (LIPITOR) tablet 20 mg, 20 mg, Oral, DAILY, Shanika Carvalho MD, 20 mg at 05/18/22 0942    lisinopriL (PRINIVIL, ZESTRIL) tablet 5 mg, 5 mg, Oral, DAILY, Shanika Carvalho MD, 5 mg at 05/18/22 5959    metoprolol tartrate (LOPRESSOR) tablet 100 mg, 100 mg, Oral, BID, Shanika Carvalho MD, 100 mg at 05/18/22 2229    pantoprazole (PROTONIX) tablet 40 mg, 40 mg, Oral, DAILY, Shanika Carvalho MD, 40 mg at 05/18/22 6380    insulin lispro (HUMALOG) injection, , SubCUTAneous, AC&HS, Shanika Carvalho MD, 18 Units at 05/18/22 1630    glucose chewable tablet 16 g, 4 Tablet, Oral, PRN, Shanika Carvalho MD    glucagon (GLUCAGEN) injection 1 mg, 1 mg, IntraMUSCular, PRN, Shanika Carvalho MD    0.9% sodium chloride infusion, 75 mL/hr, IntraVENous, CONTINUOUS, Shanika Carvalho MD, Last Rate: 75 mL/hr at 05/19/22 0638, 75 mL/hr at 05/19/22 9774    acetaminophen (TYLENOL) tablet 650 mg, 650 mg, Oral, Q6H PRN, 650 mg at 05/18/22 1813 **OR** acetaminophen (TYLENOL) suppository 650 mg, 650 mg, Rectal, Q6H PRN, Shanika Carvalho MD    polyethylene glycol (MIRALAX) packet 17 g, 17 g, Oral, DAILY PRN, Shanika Carvalho MD    ondansetron (ZOFRAN ODT) tablet 4 mg, 4 mg, Oral, Q8H PRN **OR** ondansetron (ZOFRAN) injection 4 mg, 4 mg, IntraVENous, Q6H PRN, Shanika Carvalho MD    enoxaparin (LOVENOX) injection 40 mg, 40 mg, SubCUTAneous, DAILY, Shanika Carvalho MD, 40 mg at 05/18/22 0947    piperacillin-tazobactam (ZOSYN) 3.375 g in 0.9% sodium chloride (MBP/ADV) 100 mL MBP, 3.375 g, IntraVENous, Q8H, Shanika Carvalho MD, Last Rate: 200 mL/hr at 05/19/22 0637, 3.375 g at 05/19/22 0742

## 2022-05-19 NOTE — DISCHARGE SUMMARY
Discharge Summary       PATIENT ID: Ashley Kelley  MRN: [id number]   YOB: 1960    DATE OF ADMISSION: 5/17/2022  7:14 AM    DATE OF DISCHARGE:   PRIMARY CARE PROVIDER: Guadalupe Hair MD     ATTENDING PHYSICIAN: Anuradha Humphreys  DISCHARGING PROVIDER: Keri Carvalho      CONSULTATIONS: IP CONSULT TO PODIATRY  IP CONSULT TO INFECTIOUS DISEASES    PROCEDURES/SURGERIES: Procedure(s):  Partial Amputation Left Great Toe    ADMITTING DIAGNOSES:    Patient Active Problem List    Diagnosis Date Noted    Osteomyelitis ([facility] [address] 05/17/2022    Noncompliance 02/14/2022    Urinary frequency 10/04/2021    Prostate cancer screening 09/21/2021    Low testosterone in male 01/11/2021    Essential hypertension 12/09/2020    Benign prostatic hyperplasia 12/09/2020    Paronychia of great toe of left foot 11/11/2020    Diabetic polyneuropathy associated with type 2 diabetes mellitus ([facility] [address] 11/11/2020    Type 2 diabetes mellitus with diabetic polyneuropathy, without long-term current use of insulin ([facility] [address] 11/11/2020    Other male erectile dysfunction 11/11/2020       DISCHARGE DIAGNOSES / PLAN:      Osteomyelitis of the left great toe distal phalanx  Uncontrolled diabetes  Hypertension  GERD      DISCHARGE MEDICATIONS:  Current Discharge Medication List      START taking these medications    Details   insulin glargine (LANTUS) 100 unit/mL injection 15 unit bid  Qty: 1 mL, Refills: 1  Start date: 5/19/2022         CONTINUE these medications which have CHANGED    Details   amoxicillin-clavulanate (Augmentin) 875-125 mg per tablet Take 1 Tablet by mouth two (2) times a day for 14 days. Qty: 20 Tablet, Refills: 0  Start date: 5/19/2022, End date: 6/2/2022         CONTINUE these medications which have NOT CHANGED    Details   lisinopriL (PRINIVIL, ZESTRIL) 5 mg tablet Take 5 mg by mouth daily. omeprazole (PRILOSEC) 40 mg capsule Take 40 mg by mouth daily.       atorvastatin (LIPITOR) 20 mg tablet Take 20 mg by mouth daily. empagliflozin (Jardiance) 10 mg tablet Take 10 mg by mouth daily. metoprolol tartrate (LOPRESSOR) 100 mg IR tablet Take 100 mg by mouth two (2) times a day. aspirin delayed-release 81 mg tablet Take 81 mg by mouth daily. STOP taking these medications       ibuprofen 100 mg tablet Comments:   Reason for Stopping:                 NOTIFY YOUR PHYSICIAN FOR ANY OF THE FOLLOWING:   Fever over 101 degrees for 24 hours. Chest pain, shortness of breath, fever, chills, nausea, vomiting, diarrhea, change in mentation, falling, weakness, bleeding. Severe pain or pain not relieved by medications. Or, any other signs or symptoms that you may have questions about. DISPOSITION:  x  Home With:   OT  PT  HH  RN       Long term SNF/Inpatient Rehab    Independent/assisted living    Hospice    Other:       PATIENT CONDITION AT DISCHARGE: Stable      PHYSICAL EXAMINATION AT DISCHARGE:  General:          Alert, cooperative, no distress, appears stated age. HEENT:           Atraumatic, anicteric sclerae, pink conjunctivae                          No oral ulcers, mucosa moist, throat clear, dentition fair  Neck:               Supple, symmetrical  Lungs:             Clear to auscultation bilaterally. No Wheezing or Rhonchi. No rales. Chest wall:      No tenderness  No Accessory muscle use. Heart:              Regular  rhythm,  No  murmur   No edema  Abdomen:        Soft, non-tender. Not distended. Bowel sounds normal  Extremities:     No cyanosis. No clubbing,                            Skin turgor normal, Capillary refill normal  Skin:                Not pale. Not Jaundiced  No rashes   Psych:             Not anxious or agitated.   Neurologic:      Alert, moves all extremities, answers questions appropriately and responds to commands     MRI FOOT LT WO CONT   Final Result   Osteomyelitis of the first distal phalanx           Recent Results (from the past 24 hour(s))   GLUCOSE, POC Collection Time: 05/18/22  7:46 PM   Result Value Ref Range    Glucose (POC) 427 (H) 65 - 117 mg/dL    Performed by Micky Coleman    GLUCOSE, POC    Collection Time: 05/19/22 12:16 AM   Result Value Ref Range    Glucose (POC) 281 (H) 65 - 117 mg/dL    Performed by Micky Coleman    CBC W/O DIFF    Collection Time: 05/19/22  7:32 AM   Result Value Ref Range    WBC 5.4 4.1 - 11.1 K/uL    RBC 5.04 4.10 - 5.70 M/uL    HGB 14.8 12.1 - 17.0 g/dL    HCT 43.8 36.6 - 50.3 %    MCV 86.9 80.0 - 99.0 FL    MCH 29.4 26.0 - 34.0 PG    MCHC 33.8 30.0 - 36.5 g/dL    RDW 12.5 11.5 - 14.5 %    PLATELET 559 453 - 608 K/uL    MPV 10.5 8.9 - 12.9 FL    NRBC 0.0 0.0  WBC    ABSOLUTE NRBC 0.00 0.00 - 0.53 K/uL   METABOLIC PANEL, COMPREHENSIVE    Collection Time: 05/19/22  7:32 AM   Result Value Ref Range    Sodium 137 136 - 145 mmol/L    Potassium 4.3 3.5 - 5.1 mmol/L    Chloride 104 97 - 108 mmol/L    CO2 27 21 - 32 mmol/L    Anion gap 6 5 - 15 mmol/L    Glucose 359 (H) 65 - 100 mg/dL    BUN 12 6 - 20 mg/dL    Creatinine 0.74 0.70 - 1.30 mg/dL    BUN/Creatinine ratio 16 12 - 20      GFR est AA >60 >60 ml/min/1.73m2    GFR est non-AA >60 >60 ml/min/1.73m2    Calcium 9.6 8.5 - 10.1 mg/dL    Bilirubin, total 0.4 0.2 - 1.0 mg/dL    AST (SGOT) 20 15 - 37 U/L    ALT (SGPT) 36 12 - 78 U/L    Alk.  phosphatase 107 45 - 117 U/L    Protein, total 6.5 6.4 - 8.2 g/dL    Albumin 3.0 (L) 3.5 - 5.0 g/dL    Globulin 3.5 2.0 - 4.0 g/dL    A-G Ratio 0.9 (L) 1.1 - 2.2     SED RATE (ESR)    Collection Time: 05/19/22  7:32 AM   Result Value Ref Range    Sed rate, automated 46 (H) 0 - 20 mm/hr   C REACTIVE PROTEIN, QT    Collection Time: 05/19/22  7:32 AM   Result Value Ref Range    C-Reactive protein 1.37 (H) 0.00 - 0.60 mg/dL   GLUCOSE, POC    Collection Time: 05/19/22  8:55 AM   Result Value Ref Range    Glucose (POC) 346 (H) 65 - 117 mg/dL    Performed by Marybel Hernandez, POC    Collection Time: 05/19/22 11:50 AM   Result Value Ref Range Glucose (POC) 220 (H) 65 - 117 mg/dL    Performed by Guadalupe Figureedo    GLUCOSE, POC    Collection Time: 05/19/22 12:55 PM   Result Value Ref Range    Glucose (POC) 262 (H) 65 - 117 mg/dL    Performed by Judi Dunn    GLUCOSE, POC    Collection Time: 05/19/22  3:42 PM   Result Value Ref Range    Glucose (POC) 423 (H) 65 - 117 mg/dL    Performed by Manisha Rob, POC    Collection Time: 05/19/22  4:50 PM   Result Value Ref Range    Glucose (POC) 331 (H) 65 - 117 mg/dL    Performed by Niecy Delgadillo COURSE:    Patient is a 60 y.o. year old male history of diabetes hypertension came to emergency room because of left great toe infection patient was sent by the podiatrist concerned about osteomyelitis seen by the ER physician MRI done shows osteomyelitis of the left distal great toe        Resting in  the bed alert awake not in distress    Patient was seen by the podiatrist and infectious disease initially started on IV Zosyn patient have partial amputation of the left great toe patient tolerated the procedure well podiatrist recommended patient can be discharged home    Patient blood sugars running high patient not compliant discussed with the patient regarding compliance with the medication and adjusted medication as an outpatient    Patient hemoglobin A1c is 12.8      Spoke infectious disease patient can discharge home Augmentin 875 twice daily for 2 weeks        Medication reconciliation done time-35 minutes 50% time spent counseling and coordination of care    Signed:   Savanna Gates MD  5/19/2022  6:45 PM

## 2022-05-19 NOTE — PROGRESS NOTES
Bedside shift change report given to SAGE Marshall (oncoming nurse) by Joan Sharif (offgoing nurse). Report included the following information SBAR.

## 2022-05-19 NOTE — OP NOTES
Operative Report    Patient: Mitch Singh MRN: [id number]  SSN: [id number]    YOB: 1960  Age: 64 y.o. Sex: male       Date of Surgery:   05/19/2022     Preoperative Diagnosis:    Osteomyelitis, left great toe    Postoperative Diagnosis:    Same     Surgeon(s) and Role:     * Gunnar Hale DPM - Primary    Assistant(s):  None    Anesthesia:   General     Procedure:  Partial amputation, left great toe     Procedure in Detail:   Pt was seen in the pre-operative holding area and all questions were answered and all concerns were addressed. The operative procedure was discussed in great detail, with all possible complications highlighted. Pt verbalized complete understanding and the consent was signed and witnessed. Pt was on scheduled abx per ID, thus no additional abx ordered for surgical prophylaxis. The operative limb was marked and the pt was transported to the operating room. The pt was transferred to the operating table and anesthesia was administered as indicated above. The left foot was scrubbed and draped in sterile fashion. A time out was performed to confirm the correct pt, correct procedure, correct limb, abx, allergies, fire risk and attendees within the operating room. Upon completion, the procedure commenced. With a 15 blade, a circumferential/tennis racquet style incision was made about the left great IPJ and the distal phalanx was disarticulated. The remaining tissue was noted to be bleeding/granular. A thorough flush was performed with normal saline. The deep tissue was coapted with 3-0 Monocryl and the skin was reapproximated with 3-0 Prolene. The surgical site was dressed with Betadine ointment, Adaptic, 4 x 4's, Kerlix and a light Ace wrap. Pt tolerated the above procedures well and all post operative counts were correct. Pt was transferred to PACU without incident. A thorough neurovascular check was then performed.  Upon meeting transfer criteria, pt was transferred back to the medical floor.     Estimated Blood Loss:    15cc    Tourniquet Time:   None    Implants:   None           Specimens:  None        Drains:   None                Complications:   None      Signed By:  Nancy Gregg DPM     May 19, 2022

## 2022-05-19 NOTE — DISCHARGE INSTRUCTIONS
Patient Education        Learning About Certified Diabetes Educators  What is a certified diabetes educator? Certified diabetes educators are health professionals who have special training to help you manage your diabetes. They may be:  · Registered nurses. · Registered dietitians. · Pharmacists. · Social workers. · Doctors. Your diabetes educator will give you tips and help with daily diabetes care. He or she also may teach classes. These classes give you a chance to learn from and connect with others who have diabetes. Why see a diabetes educator? Your doctor wants you to get the personal support and help that a diabetes educator can give. And most insurance plans will cover part or all of the cost.  Learning is a key part of living with diabetes. A diabetes educator teaches about the most important parts of your care. You will learn about:  · Eating healthy meals. · Being active. · Taking medicine. · Checking your blood sugar. · Dealing with your feelings about having diabetes. He or she can help you find ways to live better with diabetes. And your diabetes educator can show you small changes that can make a big difference in your daily routine and your health. If you need to make a big change, he or she will be able to answer your questions and guide you though each step. When should you see one? It can be helpful to see a diabetes educator at certain points in your care. He or she can:  · Get you started when you're first diagnosed with diabetes. · Check in once a year for a review of your health and daily routine. · Show you how to handle a new health problem along with your diabetes. · Help you work with a new health care team.  Follow-up care is a key part of your treatment and safety. Be sure to make and go to all appointments, and call your doctor if you are having problems. It's also a good idea to know your test results and keep a list of the medicines you take.   Where can you learn more?  Go to http://www.gray.com/  Enter H300 in the search box to learn more about \"Learning About Certified Diabetes Educators. \"  Current as of: July 28, 2021               Content Version: 13.2  © 3043-7876 [employer]. Care instructions adapted under license by [employer] (which disclaims liability or warranty for this information). If you have questions about a medical condition or this instruction, always ask your healthcare professional. [place] [address] any warranty or liability for your use of this information. Discharge Instructions       PATIENT ID: Martha Villa  MRN: [id number]   YOB: 1960    DATE OF ADMISSION: 5/17/2022  7:14 AM    DATE OF DISCHARGE: 5/19/2022    PRIMARY CARE PROVIDER: Anmol Morris MD     ATTENDING PHYSICIAN: Trista Pimentel MD  DISCHARGING PROVIDER: Juvenal Aguilar MD    To contact this individual call [phone] and ask the  to page. If unavailable ask to be transferred the Adult Hospitalist Department. DISCHARGE DIAGNOSES osteomyelitis    CONSULTATIONS: IP CONSULT TO PODIATRY  IP CONSULT TO INFECTIOUS DISEASES    PROCEDURES/SURGERIES: Procedure(s):  Partial Amputation Left Great Toe    PENDING TEST RESULTS:   At the time of discharge the following test results are still pending: None    FOLLOW UP APPOINTMENTS:   Follow-up Information     Follow up With Specialties Details Why Contact Info    Anmol Morris MD Family Medicine In 1 week  [address]  [address] [address] [phone]             ADDITIONAL CARE RECOMMENDATIONS: Wound care    DIET: Diabetic Diet      ACTIVITY: {discharge activity:41894}    Wound care:  Wound Care Order: submitted to [facility] Please view [email or web address]    EQUIPMENT needed: ***      DISCHARGE MEDICATIONS:   See Medication Reconciliation Form    · It is important that you take the medication exactly as they are prescribed. · Keep your medication in the bottles provided by the pharmacist and keep a list of the medication names, dosages, and times to be taken in your wallet. · Do not take other medications without consulting your doctor. NOTIFY YOUR PHYSICIAN FOR ANY OF THE FOLLOWING:   Fever over 101 degrees for 24 hours. Chest pain, shortness of breath, fever, chills, nausea, vomiting, diarrhea, change in mentation, falling, weakness, bleeding. Severe pain or pain not relieved by medications. Or, any other signs or symptoms that you may have questions about. DISPOSITION:    Home With:   OT  PT  HH  RN       SNF/Inpatient Rehab/LTAC    Independent/assisted living    Hospice    Other:         PROBLEM LIST Updated:  Yes ***       Signed:   Pooja Johnston MD  5/19/2022  6:44 PM     Discharge Instructions       PATIENT ID: Gaynel Mcburney  MRN: [id number]   YOB: 1960    DATE OF ADMISSION: 5/17/2022  7:14 AM    DATE OF DISCHARGE: 5/19/2022    PRIMARY CARE PROVIDER: Bia Morse MD     ATTENDING PHYSICIAN: Dago Bush MD  DISCHARGING PROVIDER: Pooja Johnston MD    To contact this individual call [phone] and ask the  to page. If unavailable ask to be transferred the Adult Hospitalist Department.     DISCHARGE DIAGNOSES ***    CONSULTATIONS: IP CONSULT TO PODIATRY  IP CONSULT TO INFECTIOUS DISEASES    PROCEDURES/SURGERIES: Procedure(s):  Partial Amputation Left Great Toe    PENDING TEST RESULTS:   At the time of discharge the following test results are still pending: ***    FOLLOW UP APPOINTMENTS:   Follow-up Information     Follow up With Specialties Details Why Contact Info    Bia Morse MD Family Medicine In 1 week  [address]  [phone]             ADDITIONAL CARE RECOMMENDATIONS: ***    DIET: {diet:81687}      ACTIVITY: {discharge activity:17626}    Wound care: {[facility] Wound Care Instructions:31700}    EQUIPMENT needed: ***      DISCHARGE MEDICATIONS:   See Medication Reconciliation Form    · It is important that you take the medication exactly as they are prescribed. · Keep your medication in the bottles provided by the pharmacist and keep a list of the medication names, dosages, and times to be taken in your wallet. · Do not take other medications without consulting your doctor. NOTIFY YOUR PHYSICIAN FOR ANY OF THE FOLLOWING:   Fever over 101 degrees for 24 hours. Chest pain, shortness of breath, fever, chills, nausea, vomiting, diarrhea, change in mentation, falling, weakness, bleeding. Severe pain or pain not relieved by medications. Or, any other signs or symptoms that you may have questions about.       DISPOSITION:    Home With:   OT  PT  HH  RN       SNF/Inpatient Rehab/LTAC    Independent/assisted living    Hospice    Other:         PROBLEM LIST Updated:  Yes ***       Signed:   Aparna Lenz MD  5/19/2022  6:44 PM

## 2022-05-20 LAB
BACTERIA SPEC CULT: NORMAL
GRAM STN SPEC: NORMAL
GRAM STN SPEC: NORMAL
SPECIAL REQUESTS,SREQ: NORMAL

## 2022-05-24 LAB
BACTERIA SPEC CULT: NORMAL
BACTERIA SPEC CULT: NORMAL
SPECIAL REQUESTS,SREQ: NORMAL
SPECIAL REQUESTS,SREQ: NORMAL

## 2022-05-27 ENCOUNTER — TELEPHONE (OUTPATIENT)
Dept: PODIATRY | Age: 62
End: 2022-05-27

## 2022-05-31 ENCOUNTER — TELEPHONE (OUTPATIENT)
Dept: UROLOGY | Age: 62
End: 2022-05-31

## 2022-05-31 ENCOUNTER — OFFICE VISIT (OUTPATIENT)
Dept: PODIATRY | Age: 62
End: 2022-05-31
Payer: COMMERCIAL

## 2022-05-31 VITALS
HEART RATE: 93 BPM | BODY MASS INDEX: 31.7 KG/M2 | TEMPERATURE: 97.1 F | DIASTOLIC BLOOD PRESSURE: 72 MMHG | WEIGHT: 247 LBS | SYSTOLIC BLOOD PRESSURE: 124 MMHG | HEIGHT: 74 IN

## 2022-05-31 DIAGNOSIS — E11.42 TYPE 2 DIABETES MELLITUS WITH DIABETIC POLYNEUROPATHY, WITHOUT LONG-TERM CURRENT USE OF INSULIN (HCC): ICD-10-CM

## 2022-05-31 DIAGNOSIS — M86.172 OTHER ACUTE OSTEOMYELITIS OF LEFT FOOT (HCC): Primary | ICD-10-CM

## 2022-05-31 PROCEDURE — 99213 OFFICE O/P EST LOW 20 MIN: CPT | Performed by: PODIATRIST

## 2022-05-31 PROCEDURE — 3046F HEMOGLOBIN A1C LEVEL >9.0%: CPT | Performed by: PODIATRIST

## 2022-05-31 RX ORDER — IBUPROFEN 600 MG/1
600 TABLET ORAL
Qty: 20 TABLET | Refills: 1 | Status: SHIPPED | OUTPATIENT
Start: 2022-05-31

## 2022-05-31 RX ORDER — CYCLOBENZAPRINE HCL 10 MG
10 TABLET ORAL
Qty: 20 TABLET | Refills: 0 | Status: SHIPPED | OUTPATIENT
Start: 2022-05-31

## 2022-05-31 NOTE — TELEPHONE ENCOUNTER
Patient needs to be rescheduled to discuss stronger doseage for injection, patient had family emergency last Friday so he could not make it   please advise appt date and time

## 2022-05-31 NOTE — TELEPHONE ENCOUNTER
Tried to call patient to schedule nxt available appt with dr. Calista Wright and was put on hold then the phone disconnected

## 2022-06-07 ENCOUNTER — OFFICE VISIT (OUTPATIENT)
Dept: PODIATRY | Age: 62
End: 2022-06-07
Payer: COMMERCIAL

## 2022-06-07 DIAGNOSIS — E11.42 TYPE 2 DIABETES MELLITUS WITH DIABETIC POLYNEUROPATHY, WITHOUT LONG-TERM CURRENT USE OF INSULIN (HCC): Primary | ICD-10-CM

## 2022-06-07 DIAGNOSIS — Z98.890 POST-OPERATIVE STATE: ICD-10-CM

## 2022-06-07 PROCEDURE — 99213 OFFICE O/P EST LOW 20 MIN: CPT | Performed by: PODIATRIST

## 2022-06-07 PROCEDURE — 3046F HEMOGLOBIN A1C LEVEL >9.0%: CPT | Performed by: PODIATRIST

## 2022-06-08 NOTE — PROGRESS NOTES
[facility]    Subjective:         Patient is a 64 y.o. male who is being seen as a returning pt for a diabetic wound to the left great toe. Patient states he has recently started working increased hours and states he developed a wound to the left great toe. He does state he has pain, rising to level of 7 out of 10. He states pain is a throbbing in nature. He admits to mild drainage but denies any systemic signs of infection. He does states his left lower extremity is very swollen. He states diagnostic testing has not been performed to interrogate the area of concern. He denies any other pedal complaints        Past Medical History:   Diagnosis Date    Arrhythmia     Hx of AFIB    CAD (coronary artery disease)     Diabetes ([facility]     Diverticulosis     Dysphagia     GERD (gastroesophageal reflux disease)     Hx of hemorrhoids     Hypercholesterolemia     Hypertension      Past Surgical History:   Procedure Laterality Date    HX COLONOSCOPY      HX ORTHOPAEDIC Left     wrist     HX SHOULDER ARTHROSCOPY Right 06/09/2021    [place] CARDIAC SURG PROCEDURE UNLIST      Atrial fibrilation        Family History   Problem Relation Age of Onset    Cancer Father       Social History     Tobacco Use    Smoking status: Former Smoker     Packs/day: 0.25     Years: 3.00     Pack years: 0.75    Smokeless tobacco: Never Used   Substance Use Topics    Alcohol use: Yes     Alcohol/week: 6.0 standard drinks     Types: 6 Cans of beer per week     No Known Allergies  Prior to Admission medications    Medication Sig Start Date End Date Taking? Authorizing Provider   ibuprofen (MOTRIN) 600 mg tablet Take 1 Tablet by mouth every six (6) hours as needed for Pain. 5/31/22   Sacha Nava DPM   cyclobenzaprine (FLEXERIL) 10 mg tablet Take 1 Tablet by mouth three (3) times daily as needed for Muscle Spasm(s).  5/31/22   Sacha Nava DPM   cadexomer iodine (Iodosorb) 0.9 % gel Use with daily wound care 5/31/22   Gema Nava [place], DPM   insulin glargine (LANTUS) 100 unit/mL injection 15 unit bid 5/19/22   Shaw Edmond MD   lisinopriL (PRINIVIL, ZESTRIL) 5 mg tablet Take 5 mg by mouth daily. 3/11/21   Provider, Historical   omeprazole (PRILOSEC) 40 mg capsule Take 40 mg by mouth daily. Provider, Historical   atorvastatin (LIPITOR) 20 mg tablet Take 20 mg by mouth daily. Provider, Historical   empagliflozin (Jardiance) 10 mg tablet Take 10 mg by mouth daily. Provider, Historical   metoprolol tartrate (LOPRESSOR) 100 mg IR tablet Take 100 mg by mouth two (2) times a day. Provider, Historical   aspirin delayed-release 81 mg tablet Take 81 mg by mouth daily. Provider, Historical       Review of Systems   Constitutional: Negative. HENT: Negative. Eyes: Negative. Respiratory: Negative. Cardiovascular: Negative. Gastrointestinal: Negative. Endocrine: Negative. Genitourinary: Negative. Musculoskeletal: Negative. Allergic/Immunologic: Positive for immunocompromised state. Neurological: Positive for numbness. Hematological: Negative. Psychiatric/Behavioral: Negative. All other systems reviewed and are negative. Objective:     Visit Vitals  /77 (BP 1 Location: Right upper arm, BP Patient Position: Sitting, BP Cuff Size: Large adult)   Pulse (!) 107   Temp 97.5 °F (36.4 °C) (Temporal)   Ht 6' 2\" (1.88 m)   Wt 252 lb (114.3 kg)   BMI 32.35 kg/m²       Physical Exam  Vitals reviewed. Constitutional:       Appearance: He is obese. Cardiovascular:      Pulses:           Dorsalis pedis pulses are 2+ on the right side and 2+ on the left side. Posterior tibial pulses are 2+ on the right side and 2+ on the left side. Pulmonary:      Effort: Pulmonary effort is normal.   Musculoskeletal:      Right lower leg: No edema. Left lower leg: Edema present. Right foot: Normal range of motion. No deformity or bunion.       Left foot: Normal range of motion. No deformity or bunion. Feet:      Right foot:      Protective Sensation: 10 sites tested. 0 sites sensed. Skin integrity: Skin integrity normal.      Toenail Condition: Right toenails are normal.      Left foot:      Protective Sensation: 10 sites tested. 0 sites sensed. Skin integrity: Ulcer, callus and dry skin present. Lymphadenopathy:      Lower Body: No right inguinal adenopathy. Left inguinal adenopathy present. Skin:     General: Skin is warm. Capillary Refill: Capillary refill takes 2 to 3 seconds. Neurological:      Mental Status: He is alert and oriented to person, place, and time. Psychiatric:         Mood and Affect: Mood and affect normal.         Behavior: Behavior is cooperative. Data Review: No results found for this or any previous visit (from the past 24 hour(s)). Impression:       ICD-10-CM ICD-9-CM    1. Type 2 diabetes mellitus with diabetic polyneuropathy, without long-term current use of insulin ([facility])  E11.42 250.60      357.2    2. Diabetic ulcer of toe of left foot associated with type 2 diabetes mellitus, with bone involvement without evidence of necrosis ([facility]  E11.621 250.80     L97.526 707.15        Recommendation:     Patient seen and evaluated in the office  Discussed and educated patient regarding their current medical condition. Local wound care performed. Instructed patient to monitor and if his symptoms worsen he is to present to the [facility] emergency department for evaluation. Verbalized instructions        Gurpreet [employer], [facility], [address] [place] [address] and [facility]  [address] [place] [address]  O: [phone]  F: [phone]  C: [phone]

## 2022-06-13 ENCOUNTER — TELEPHONE (OUTPATIENT)
Dept: PODIATRY | Age: 62
End: 2022-06-13

## 2022-06-14 NOTE — PROGRESS NOTES
[place] [facility]    Subjective:         Patient is a 64 y.o. male who is being seen as a returning pt for a diabetic wound to the left great toe. He underwent a partial amputation of the toe on 5-. He has kept his dressing clean/dry/intact. He has continued with his discharge antibiotics as instructed. Patient states he has returned back to work and is on his feet many hours throughout the day. He does state he has pain, rising to level of 6 out of 10. He states pain is a throbbing in nature. He admits to mild drainage but denies any systemic signs of infection. He does states his left lower extremity is very swollen. He denies any other pedal complaints      Past Medical History:   Diagnosis Date    Arrhythmia     Hx of AFIB    CAD (coronary artery disease)     Diabetes ([address]     Diverticulosis     Dysphagia     GERD (gastroesophageal reflux disease)     Hx of hemorrhoids     Hypercholesterolemia     Hypertension      Past Surgical History:   Procedure Laterality Date    HX COLONOSCOPY      HX ORTHOPAEDIC Left     wrist     HX SHOULDER ARTHROSCOPY Right 06/09/2021    [place] CARDIAC SURG PROCEDURE UNLIST      Atrial fibrilation        Family History   Problem Relation Age of Onset    Cancer Father       Social History     Tobacco Use    Smoking status: Former Smoker     Packs/day: 0.25     Years: 3.00     Pack years: 0.75    Smokeless tobacco: Never Used   Substance Use Topics    Alcohol use: Yes     Alcohol/week: 6.0 standard drinks     Types: 6 Cans of beer per week     No Known Allergies  Prior to Admission medications    Medication Sig Start Date End Date Taking? Authorizing Provider   ibuprofen (MOTRIN) 600 mg tablet Take 1 Tablet by mouth every six (6) hours as needed for Pain. 5/31/22   Herman Nava DPM   cyclobenzaprine (FLEXERIL) 10 mg tablet Take 1 Tablet by mouth three (3) times daily as needed for Muscle Spasm(s).  5/31/22   Edgra Galindo DPM cadexomer iodine (Iodosorb) 0.9 % gel Use with daily wound care 5/31/22   Camila Nava DPM   insulin glargine (LANTUS) 100 unit/mL injection 15 unit bid 5/19/22   Jewell Omalley MD   lisinopriL (PRINIVIL, ZESTRIL) 5 mg tablet Take 5 mg by mouth daily. 3/11/21   Provider, Historical   omeprazole (PRILOSEC) 40 mg capsule Take 40 mg by mouth daily. Provider, Historical   atorvastatin (LIPITOR) 20 mg tablet Take 20 mg by mouth daily. Provider, Historical   empagliflozin (Jardiance) 10 mg tablet Take 10 mg by mouth daily. Provider, Historical   metoprolol tartrate (LOPRESSOR) 100 mg IR tablet Take 100 mg by mouth two (2) times a day. Provider, Historical   aspirin delayed-release 81 mg tablet Take 81 mg by mouth daily. Provider, Historical       Review of Systems   Constitutional: Negative. HENT: Negative. Eyes: Negative. Respiratory: Negative. Cardiovascular: Negative. Gastrointestinal: Negative. Endocrine: Negative. Genitourinary: Negative. Musculoskeletal: Negative. Allergic/Immunologic: Positive for immunocompromised state. Neurological: Positive for numbness. Hematological: Negative. Psychiatric/Behavioral: Negative. All other systems reviewed and are negative. Objective: There were no vitals taken for this visit. Physical Exam  Vitals reviewed. Constitutional:       Appearance: He is obese. Cardiovascular:      Pulses:           Dorsalis pedis pulses are 2+ on the right side and 2+ on the left side. Posterior tibial pulses are 2+ on the right side and 2+ on the left side. Pulmonary:      Effort: Pulmonary effort is normal.   Musculoskeletal:      Right lower leg: No edema. Left lower leg: Edema present. Right foot: Normal range of motion. No deformity or bunion. Left foot: Normal range of motion. No deformity or bunion. Feet:      Right foot:      Protective Sensation: 10 sites tested. 0 sites sensed. Skin integrity: Skin integrity normal.      Toenail Condition: Right toenails are normal.      Left foot:      Protective Sensation: 10 sites tested. 0 sites sensed. Skin integrity: Callus and dry skin present. Comments: Surgical site noted to the distal left great toe to be intact with sutures in place. Ecchymosis noted to the distal aspect of the left second toe. No wound formation or clinical signs of infection noted  Lymphadenopathy:      Lower Body: No right inguinal adenopathy. Left inguinal adenopathy present. Skin:     General: Skin is warm. Capillary Refill: Capillary refill takes 2 to 3 seconds. Neurological:      Mental Status: He is alert and oriented to person, place, and time. Psychiatric:         Mood and Affect: Mood and affect normal.         Behavior: Behavior is cooperative. Data Review: No results found for this or any previous visit (from the past 24 hour(s)). Impression:       ICD-10-CM ICD-9-CM    1. Type 2 diabetes mellitus with diabetic polyneuropathy, without long-term current use of insulin ([facility])  E11.42 250.60      357.2    2. Post-operative state  Z98.890 V45.89        Recommendation:     Patient seen and evaluated in the office  Discussed and educated patient regarding their current medical condition. Local wound care performed. Instructed patient to monitor and if his symptoms worsen he is to the office immediately. Instructed patient to continue with his discharge antibiotics. Also, highly urged patient to limit his weightbearing to prevent future wound formation to the distal left second toe. Verbalized instructions        Gurpreet Ty, [address] [facility], [address] [place] [address] and [facility]  [address] [place] [address]  O: [phone]  F: [phone]  C: [phone]

## 2022-06-16 ENCOUNTER — TELEPHONE (OUTPATIENT)
Dept: PODIATRY | Age: 62
End: 2022-06-16

## 2022-06-29 ENCOUNTER — OFFICE VISIT (OUTPATIENT)
Dept: PODIATRY | Age: 62
End: 2022-06-29
Payer: COMMERCIAL

## 2022-06-29 VITALS
HEART RATE: 105 BPM | WEIGHT: 247 LBS | SYSTOLIC BLOOD PRESSURE: 118 MMHG | DIASTOLIC BLOOD PRESSURE: 71 MMHG | TEMPERATURE: 98.6 F | HEIGHT: 74 IN | BODY MASS INDEX: 31.7 KG/M2

## 2022-06-29 DIAGNOSIS — E11.621 DIABETIC ULCER OF TOE OF LEFT FOOT ASSOCIATED WITH TYPE 2 DIABETES MELLITUS, WITH BONE INVOLVEMENT WITHOUT EVIDENCE OF NECROSIS (HCC): Primary | ICD-10-CM

## 2022-06-29 DIAGNOSIS — L97.526 DIABETIC ULCER OF TOE OF LEFT FOOT ASSOCIATED WITH TYPE 2 DIABETES MELLITUS, WITH BONE INVOLVEMENT WITHOUT EVIDENCE OF NECROSIS (HCC): Primary | ICD-10-CM

## 2022-06-29 DIAGNOSIS — E11.42 TYPE 2 DIABETES MELLITUS WITH DIABETIC POLYNEUROPATHY, WITHOUT LONG-TERM CURRENT USE OF INSULIN (HCC): ICD-10-CM

## 2022-06-29 DIAGNOSIS — Z89.412 STATUS POST AMPUTATION OF LEFT GREAT TOE (HCC): ICD-10-CM

## 2022-06-29 PROCEDURE — 99213 OFFICE O/P EST LOW 20 MIN: CPT | Performed by: PODIATRIST

## 2022-06-29 PROCEDURE — 3046F HEMOGLOBIN A1C LEVEL >9.0%: CPT | Performed by: PODIATRIST

## 2022-06-29 NOTE — LETTER
NOTIFICATION RETURN TO WORK / SCHOOL        6/29/2022 2:31 PM          Mr. Angel Haq  [facility] [address] [facility] [address]      To Whom It May Concern:    Angel Haq is currently under the care of Stella Alcocer. He will need light duty with VERY limited standing for a period of (4) weeks starting 06/29/2022 and ending 07/27/2022    If there are questions or concerns please have the patient contact our office.         Sincerely,              Herman Nguyen DPM

## 2022-06-29 NOTE — PROGRESS NOTES
Chief Complaint   Patient presents with    Wound Check     pt states he did not recieve flexeril      1. Have you been to the ER, urgent care clinic since your last visit? Hospitalized since your last visit? No    2. Have you seen or consulted any other health care providers outside of the [address] [place] [address] since your last visit? Include any pap smears or colon screening.  No  PCP- [facility] [employer]

## 2022-06-30 NOTE — PROGRESS NOTES
[facility]    Subjective:         Patient is a 64 y.o. male who is being seen as a returning pt s/p partial amputation of the left great toe (DOS 05/19/2022). This is his first post op visit. States he has kept his dressing C/D/I. Cont with abx. Admits to mild pain. States he has returned to work. Denies any systemic signs of infx. Denies any other pedal complaints. Past Medical History:   Diagnosis Date    Arrhythmia     Hx of AFIB    CAD (coronary artery disease)     Diabetes ([address]     Diverticulosis     Dysphagia     GERD (gastroesophageal reflux disease)     Hx of hemorrhoids     Hypercholesterolemia     Hypertension      Past Surgical History:   Procedure Laterality Date    HX COLONOSCOPY      HX ORTHOPAEDIC Left     wrist     HX SHOULDER ARTHROSCOPY Right 06/09/2021    SD CARDIAC SURG PROCEDURE UNLIST      Atrial fibrilation        Family History   Problem Relation Age of Onset    Cancer Father       Social History     Tobacco Use    Smoking status: Former Smoker     Packs/day: 0.25     Years: 3.00     Pack years: 0.75    Smokeless tobacco: Never Used   Substance Use Topics    Alcohol use: Yes     Alcohol/week: 6.0 standard drinks     Types: 6 Cans of beer per week     No Known Allergies  Prior to Admission medications    Medication Sig Start Date End Date Taking? Authorizing Provider   cyclobenzaprine (FLEXERIL) 10 mg tablet Take 1 Tablet by mouth three (3) times daily as needed for Muscle Spasm(s). 5/31/22  Yes Gary Nava DPM   cadexomer iodine (Iodosorb) 0.9 % gel Use with daily wound care 5/31/22  Yes Gary Nava DPM   ibuprofen (MOTRIN) 600 mg tablet Take 1 Tablet by mouth every six (6) hours as needed for Pain. 5/31/22   Gary Nava DPM   insulin glargine (LANTUS) 100 unit/mL injection 15 unit bid 5/19/22   Dinorah Carvalho MD   lisinopriL (PRINIVIL, ZESTRIL) 5 mg tablet Take 5 mg by mouth daily.  3/11/21   Provider, Historical omeprazole (PRILOSEC) 40 mg capsule Take 40 mg by mouth daily. Provider, Historical   atorvastatin (LIPITOR) 20 mg tablet Take 20 mg by mouth daily. Provider, Historical   empagliflozin (Jardiance) 10 mg tablet Take 10 mg by mouth daily. Provider, Historical   metoprolol tartrate (LOPRESSOR) 100 mg IR tablet Take 100 mg by mouth two (2) times a day. Provider, Historical   aspirin delayed-release 81 mg tablet Take 81 mg by mouth daily. Provider, Historical       Review of Systems   Constitutional: Negative. HENT: Negative. Eyes: Negative. Respiratory: Negative. Cardiovascular: Negative. Gastrointestinal: Negative. Endocrine: Negative. Genitourinary: Negative. Musculoskeletal: Negative. Allergic/Immunologic: Positive for immunocompromised state. Neurological: Positive for numbness. Hematological: Negative. Psychiatric/Behavioral: Negative. All other systems reviewed and are negative. Objective:     Visit Vitals  /72 (BP 1 Location: Right upper arm, BP Patient Position: Sitting, BP Cuff Size: Large adult)   Pulse 93   Temp 97.1 °F (36.2 °C) (Temporal)   Ht 6' 2\" (1.88 m)   Wt 247 lb (112 kg)   BMI 31.71 kg/m²       Physical Exam  Vitals reviewed. Constitutional:       Appearance: He is obese. Cardiovascular:      Pulses:           Dorsalis pedis pulses are 2+ on the right side and 2+ on the left side. Posterior tibial pulses are 2+ on the right side and 2+ on the left side. Pulmonary:      Effort: Pulmonary effort is normal.   Musculoskeletal:      Right lower leg: No edema. Left lower leg: Edema present. Right foot: Normal range of motion. No deformity or bunion. Left foot: Normal range of motion. No deformity or bunion. Feet:      Right foot:      Protective Sensation: 10 sites tested. 0 sites sensed.       Skin integrity: Skin integrity normal.      Toenail Condition: Right toenails are normal.      Left foot: Protective Sensation: 10 sites tested. 0 sites sensed. Comments: Surgical site noted to the left great toe. Intact sutures. No signs of infx  Lymphadenopathy:      Lower Body: No right inguinal adenopathy. Left inguinal adenopathy present. Skin:     General: Skin is warm. Capillary Refill: Capillary refill takes 2 to 3 seconds. Neurological:      Mental Status: He is alert and oriented to person, place, and time. Psychiatric:         Mood and Affect: Mood and affect normal.         Behavior: Behavior is cooperative. Data Review: No results found for this or any previous visit (from the past 24 hour(s)). Impression:       ICD-10-CM ICD-9-CM    1. Other acute osteomyelitis of left foot ([facility]  M86.172 730.07 REFERRAL TO HOME HEALTH   2. Type 2 diabetes mellitus with diabetic polyneuropathy, without long-term current use of insulin ([facility])  E11.42 250.60      357.2          Recommendation:     Patient seen and evaluated in the office  Discussed and educated patient regarding their current medical condition. Local wound care performed. Order given for iodosorb for daily wound care. Order given for [address] Also, order given for flexeril 10mg to be taken as a sleep aid. Pt to limit pressure for wound healing purposes. Monitor for infx and call the office immediately if needed. Gurpreet Burton, [address] [facility], [address] [place] [address] and [facility]  [address] [place] [address]  O: [phone]  F: [phone]  C: [phone]

## 2022-07-06 DIAGNOSIS — L97.526 DIABETIC ULCER OF TOE OF LEFT FOOT ASSOCIATED WITH TYPE 2 DIABETES MELLITUS, WITH BONE INVOLVEMENT WITHOUT EVIDENCE OF NECROSIS (HCC): Primary | ICD-10-CM

## 2022-07-06 DIAGNOSIS — E11.621 DIABETIC ULCER OF TOE OF LEFT FOOT ASSOCIATED WITH TYPE 2 DIABETES MELLITUS, WITH BONE INVOLVEMENT WITHOUT EVIDENCE OF NECROSIS (HCC): Primary | ICD-10-CM

## 2022-07-28 ENCOUNTER — OFFICE VISIT (OUTPATIENT)
Dept: PODIATRY | Age: 62
End: 2022-07-28
Payer: COMMERCIAL

## 2022-07-28 DIAGNOSIS — M86.172 OTHER ACUTE OSTEOMYELITIS OF LEFT FOOT (HCC): ICD-10-CM

## 2022-07-28 DIAGNOSIS — E11.42 TYPE 2 DIABETES MELLITUS WITH DIABETIC POLYNEUROPATHY, WITHOUT LONG-TERM CURRENT USE OF INSULIN (HCC): Primary | ICD-10-CM

## 2022-07-28 DIAGNOSIS — L97.524 DIABETIC ULCER OF TOE OF LEFT FOOT ASSOCIATED WITH TYPE 2 DIABETES MELLITUS, WITH NECROSIS OF BONE (HCC): ICD-10-CM

## 2022-07-28 DIAGNOSIS — E11.621 DIABETIC ULCER OF TOE OF LEFT FOOT ASSOCIATED WITH TYPE 2 DIABETES MELLITUS, WITH NECROSIS OF BONE (HCC): ICD-10-CM

## 2022-07-28 PROCEDURE — 99213 OFFICE O/P EST LOW 20 MIN: CPT | Performed by: PODIATRIST

## 2022-07-28 PROCEDURE — 3046F HEMOGLOBIN A1C LEVEL >9.0%: CPT | Performed by: PODIATRIST

## 2022-07-28 PROCEDURE — 28005 TREAT FOOT BONE LESION: CPT | Performed by: PODIATRIST

## 2022-07-28 NOTE — PROGRESS NOTES
1. \"Have you been to the ER, urgent care clinic since your last visit? Hospitalized since your last visit? \" No    2. \"Have you seen or consulted any other health care providers outside of the [address] [place] [address] since your last visit? \" No     3. For patients aged 39-70: Has the patient had a colonoscopy / FIT/ Cologuard? No      If the patient is female:    4. For patients aged 41-77: Has the patient had a mammogram within the past 2 years? NA - based on age or sex      11. For patients aged 21-65: Has the patient had a pap smear?  NA - based on age or sex    Chief Complaint   Patient presents with    Wound Check

## 2022-07-29 ENCOUNTER — OFFICE VISIT (OUTPATIENT)
Dept: UROLOGY | Age: 62
End: 2022-07-29
Payer: COMMERCIAL

## 2022-07-29 VITALS
DIASTOLIC BLOOD PRESSURE: 88 MMHG | HEART RATE: 86 BPM | HEIGHT: 74 IN | WEIGHT: 264 LBS | BODY MASS INDEX: 33.88 KG/M2 | SYSTOLIC BLOOD PRESSURE: 139 MMHG

## 2022-07-29 DIAGNOSIS — E11.42 TYPE 2 DIABETES MELLITUS WITH DIABETIC POLYNEUROPATHY, WITHOUT LONG-TERM CURRENT USE OF INSULIN (HCC): ICD-10-CM

## 2022-07-29 DIAGNOSIS — N52.8 OTHER MALE ERECTILE DYSFUNCTION: ICD-10-CM

## 2022-07-29 DIAGNOSIS — N40.0 BENIGN PROSTATIC HYPERPLASIA, UNSPECIFIED WHETHER LOWER URINARY TRACT SYMPTOMS PRESENT: ICD-10-CM

## 2022-07-29 DIAGNOSIS — R79.89 LOW TESTOSTERONE IN MALE: Primary | ICD-10-CM

## 2022-07-29 DIAGNOSIS — Z12.5 PROSTATE CANCER SCREENING: ICD-10-CM

## 2022-07-29 LAB
BILIRUB UR QL: NEGATIVE
GLUCOSE UR-MCNC: NEGATIVE MG/DL
KETONES P FAST UR STRIP-MCNC: NEGATIVE MG/DL
PH UR STRIP: 7 [PH] (ref 4.6–8)
PROT UR QL STRIP: 100
SP GR UR STRIP: 1.02 (ref 1–1.03)
UA UROBILINOGEN AMB POC: NORMAL (ref 0.2–1)
URINALYSIS CLARITY POC: CLEAR
URINALYSIS COLOR POC: YELLOW
URINE BLOOD POC: NEGATIVE
URINE LEUKOCYTES POC: NEGATIVE
URINE NITRITES POC: NEGATIVE

## 2022-07-29 PROCEDURE — 99214 OFFICE O/P EST MOD 30 MIN: CPT | Performed by: UROLOGY

## 2022-07-29 PROCEDURE — 81003 URINALYSIS AUTO W/O SCOPE: CPT | Performed by: UROLOGY

## 2022-07-29 RX ORDER — TADALAFIL 20 MG/1
20 TABLET ORAL AS NEEDED
Qty: 30 TABLET | Refills: 5 | Status: SHIPPED | OUTPATIENT
Start: 2022-07-29

## 2022-07-29 RX ORDER — TESTOSTERONE CYPIONATE 200 MG/ML
200 INJECTION INTRAMUSCULAR EVERY 2 WEEKS
Qty: 10 ML | Refills: 3 | Status: SHIPPED | OUTPATIENT
Start: 2022-07-29 | End: 2022-08-16 | Stop reason: SDUPTHER

## 2022-07-29 RX ORDER — TAMSULOSIN HYDROCHLORIDE 0.4 MG/1
0.4 CAPSULE ORAL DAILY
Qty: 90 CAPSULE | Refills: 3 | Status: SHIPPED | OUTPATIENT
Start: 2022-07-29

## 2022-07-29 NOTE — PROGRESS NOTES
HISTORY OF PRESENT ILLNESS  Kristy Gómez is a 64 y.o. male. Chief Complaint   Patient presents with    Follow-up    Benign Prostatic Hypertrophy    Hypogonadism    Erectile Dysfunction    Urinary Frequency       Past Medical History:  PMHx (including negatives):  has a past medical history of Arrhythmia, CAD (coronary artery disease), Diabetes ([address] Diverticulosis, Dysphagia, GERD (gastroesophageal reflux disease), hemorrhoids, Hypercholesterolemia, and Hypertension. PSurgHx:  has a past surgical history that includes pr cardiac surg procedure unlist; hx shoulder arthroscopy (Right, 06/09/2021); hx orthopaedic (Left); hx colonoscopy; and hx orthopaedic (Left). PSocHx:  reports that he has quit smoking. His smoking use included cigarettes. He has a 0.75 pack-year smoking history. He has never used smokeless tobacco. He reports current alcohol use of about 6.0 standard drinks per week. He reports that he does not currently use drugs after having used the following drugs: Marijuana and Cocaine. Mr. Rohit Mckinley has been on and off of TRT. His last injection with us was in December 2021. He had a bela relationship. He was kicked out by his GF, had a restraining order, and was told he owed money. He is in a new relationship. His erections have faded. He is also on tadalafil for ED but found it to be less effective without testosterone injections. He has contributory factors such as diabetes, and HTN. Last hemoglobin A1c in May was 12.8. He had moderate voiding symptoms of BPH, improved on daily dosed tadalafil. Chronic Conditions Addressed Today       1. Type 2 diabetes mellitus with diabetic polyneuropathy, without long-term current use of insulin ([facility])     Overview      Hemoglobin A1c on 7/13/21 was 11.7          Current Assessment & Plan      Diabetes has not been well controlled. He is advised to decrease his carbohydrate intake, especially fries.            Relevant Orders     AMB POC URINALYSIS DIP STICK AUTO W/O MICRO (Completed)    2. Other male erectile dysfunction     Overview      11/11/2020: Starting summer 2020. He has less confidence in his erections. He can achieve 30-40% tumescence barely enough for penetration. He cannot maintain it. His interest is normal.  It is affecting his relationships. He has bothersome ED. Contributing factors are diabetes and HTN. He was started on tadalafil 5mg daily. Erections went from 2/10 to 4-5/10.     1/14/21: He was started on TRT, testosterone cypionate 200 mg IM q 2 weeks. 3/23/21: Daily tadalafil 5 mg dosing. He has resumed TRT as well. He was not consistently injecting. 10/6/21: tadalafil with less effect off of TRT. Current Assessment & Plan      He is not likely to have results from PDE5i unless his testosterone levels are addressed. Rx for tadalafil given. He has been on it in the past.            Relevant Orders     TESTOSTERONE, FREE & TOTAL    3. Benign prostatic hyperplasia     Overview      11/11/2020: Moderate LUTS. Tadalafil may help some, 5 mg daily. Rich Gordon MD).  1/13/21: improved on tadalafil. Voiding with less frequency. 10/6/21: stable; continue on tadalafil. Current Assessment & Plan       No bothersome LUTS, not on medication. Slightly weak stream.            Relevant Medications     tamsulosin (FLOMAX) 0.4 mg capsule     Other Relevant Orders     AMB POC URINALYSIS DIP STICK AUTO W/O MICRO (Completed)    4. Low testosterone in male - Primary     Overview      12/9/2020: serum testosterone was 210, though free testosterone was 8.9.  1/13/21: adequate free testosterone levels indicate that TRT may not help. However, he wanted to try. Started on 200 mg IM testosterone cypionate q 2 weeks on 1/14/21. He was not consistently injecting.   He wished to restart therapy on 3/23/21.    6/10/21: therapeutic labs in May.      7/13/21: hepatic panel WNL, PSA 0.3, serum T was 106 with free T at 6.9    10/6/21: He has been off of therapy since June when he ran out. Resume dosing 200 mg q 2 weeks, IM.    11/16/21: received injection. 12/17/21: received injection. Current Assessment & Plan      Off therapy since 12/2021. He wants to resume treatment. Check baseline labs now. Testosterone prescribed. He will come in next week for soft injection with my nurse practitioner. Follow-up with me in 1 month with labs. Relevant Medications     testosterone cypionate (DEPOTESTOTERONE CYPIONATE) 200 mg/mL injection     Other Relevant Orders     TESTOSTERONE, FREE & TOTAL     HEPATIC FUNCTION PANEL     TESTOSTERONE, FREE & TOTAL    5. Prostate cancer screening     Overview      PSA 12/9/2020 was 0.3  PSA 7/13/21 was 0.3  PSA 10/5/21: 0.3          Current Assessment & Plan       His next PSA and exam is due in October. Review of Systems   All other systems reviewed and are negative. Patient denies the symptoms of COVID-19 per routine screening guidelines. Physical Exam  Constitutional:       Appearance: Normal appearance. He is obese. HENT:      Head: Normocephalic and atraumatic. Nose: Nose normal.   Eyes:      Extraocular Movements: Extraocular movements intact. Conjunctiva/sclera: Conjunctivae normal.   Pulmonary:      Effort: Pulmonary effort is normal.   Neurological:      Mental Status: He is alert and oriented to person, place, and time. Psychiatric:         Mood and Affect: Mood normal.         Behavior: Behavior normal.       ASSESSMENT and PLAN  Diagnoses and all orders for this visit:    1. Low testosterone in male  Assessment & Plan:  Off therapy since 12/2021. He wants to resume treatment. Check baseline labs now. Testosterone prescribed. He will come in next week for soft injection with my nurse practitioner. Follow-up with me in 1 month with labs.     Orders:  -     TESTOSTERONE, FREE & TOTAL  -     testosterone cypionate (Wyatt Brooks CYPIONATE) 200 mg/mL injection; 1 mL by IntraMUSCular route Once every 2 weeks. Max Daily Amount: 200 mg.  -     HEPATIC FUNCTION PANEL; Future  -     TESTOSTERONE, FREE & TOTAL; Future    2. Other male erectile dysfunction  Assessment & Plan:  He is not likely to have results from PDE5i unless his testosterone levels are addressed. Rx for tadalafil given. He has been on it in the past.      Orders:  -     TESTOSTERONE, FREE & TOTAL    3. Benign prostatic hyperplasia, unspecified whether lower urinary tract symptoms present  Assessment & Plan:   No bothersome LUTS, not on medication. Slightly weak stream.      Orders:  -     AMB POC URINALYSIS DIP STICK AUTO W/O MICRO    4. Prostate cancer screening  Assessment & Plan:   His next PSA and exam is due in October. 5. Type 2 diabetes mellitus with diabetic polyneuropathy, without long-term current use of insulin ([facility]  Assessment & Plan:  Diabetes has not been well controlled. He is advised to decrease his carbohydrate intake, especially fries. Orders:  -     AMB POC URINALYSIS DIP STICK AUTO W/O MICRO    Other orders  -     tamsulosin (FLOMAX) 0.4 mg capsule; Take 1 Capsule by mouth in the morning.  -     tadalafiL (CIALIS) 20 mg tablet; Take 1 Tablet by mouth as needed for Erectile Dysfunction. Follow-up and Dispositions    Return in about 1 month (around 8/29/2022). Silvio Lanre may have a reminder for a \"due or due soon\" health maintenance. The patient has been encouraged to contact their primary care provider for follow-up on this health maintenance or other necessary and/or routine health screening.      Meggan Vazquez MD

## 2022-07-29 NOTE — PROGRESS NOTES
Chief Complaint   Patient presents with    Follow-up    Benign Prostatic Hypertrophy    Hypogonadism    Erectile Dysfunction    Urinary Frequency     1. Have you been to the ER, urgent care clinic since your last visit? Hospitalized since your last visit? No    2. Have you seen or consulted any other health care providers outside of the [address] [place] [address] since your last visit? Include any pap smears or colon screening.  No        Visit Vitals  /88 (BP 1 Location: Right upper arm, BP Patient Position: Sitting, BP Cuff Size: Large adult)   Pulse 86   Ht 6' 2\" (1.88 m)   Wt 264 lb (119.7 kg)   BMI 33.90 kg/m²

## 2022-07-29 NOTE — PROGRESS NOTES
[facility]    Subjective:         Patient is a 64 y.o. male who is being seen as a returning pt for a diabetic wound to the left great toe and new onset left second toe ulcer. He underwent a partial amputation of the left great toe on 5-. He has kept his dressing clean/dry/intact. He has completed with his discharge antibiotics as instructed. Patient states he has returned back to work and is on his feet many hours throughout the day. He does state he has pain, rising to level of 7 out of 10. He states pain is a throbbing in nature. He admits to mild drainage but denies any systemic signs of infection. He does states his left lower extremity is very swollen. He denies any other pedal complaints      Past Medical History:   Diagnosis Date    Arrhythmia     Hx of AFIB    CAD (coronary artery disease)     Diabetes (HCC)     Diverticulosis     Dysphagia     GERD (gastroesophageal reflux disease)     Hx of hemorrhoids     Hypercholesterolemia     Hypertension      Past Surgical History:   Procedure Laterality Date    HX COLONOSCOPY      HX ORTHOPAEDIC Left     wrist     HX SHOULDER ARTHROSCOPY Right 06/09/2021    [place] CARDIAC SURG PROCEDURE UNLIST      Atrial fibrilation        Family History   Problem Relation Age of Onset    Cancer Father       Social History     Tobacco Use    Smoking status: Former     Packs/day: 0.25     Years: 3.00     Pack years: 0.75     Types: Cigarettes    Smokeless tobacco: Never   Substance Use Topics    Alcohol use: Yes     Alcohol/week: 6.0 standard drinks     Types: 6 Cans of beer per week     No Known Allergies  Prior to Admission medications    Medication Sig Start Date End Date Taking? Authorizing Provider   ibuprofen (MOTRIN) 600 mg tablet Take 1 Tablet by mouth every six (6) hours as needed for Pain.  5/31/22   Adrianna Nava, COTY   cyclobenzaprine (FLEXERIL) 10 mg tablet Take 1 Tablet by mouth three (3) times daily as needed for Muscle Spasm(s). 5/31/22   Evans Nava DPM   cadexomer iodine (Iodosorb) 0.9 % gel Use with daily wound care 5/31/22   Evans Nava DPM   insulin glargine (LANTUS) 100 unit/mL injection 15 unit bid 5/19/22   Mary Sue MD   lisinopriL (PRINIVIL, ZESTRIL) 5 mg tablet Take 5 mg by mouth daily. 3/11/21   Provider, Historical   omeprazole (PRILOSEC) 40 mg capsule Take 40 mg by mouth daily. Provider, Historical   atorvastatin (LIPITOR) 20 mg tablet Take 20 mg by mouth daily. Provider, Historical   empagliflozin (Jardiance) 10 mg tablet Take 10 mg by mouth daily. Provider, Historical   metoprolol tartrate (LOPRESSOR) 100 mg IR tablet Take 100 mg by mouth two (2) times a day. Provider, Historical   aspirin delayed-release 81 mg tablet Take 81 mg by mouth daily. Provider, Historical       Review of Systems   Constitutional: Negative. HENT: Negative. Eyes: Negative. Respiratory: Negative. Cardiovascular: Negative. Gastrointestinal: Negative. Endocrine: Negative. Genitourinary: Negative. Musculoskeletal: Negative. Allergic/Immunologic: Positive for immunocompromised state. Neurological:  Positive for numbness. Hematological: Negative. Psychiatric/Behavioral: Negative. All other systems reviewed and are negative. Objective:     Visit Vitals  /71 (BP 1 Location: Left upper arm, BP Patient Position: Sitting, BP Cuff Size: Large adult)   Pulse (!) 105   Temp 98.6 °F (37 °C) (Temporal)   Ht 6' 2\" (1.88 m)   Wt 247 lb (112 kg)   BMI 31.71 kg/m²       Physical Exam  Vitals reviewed. Constitutional:       Appearance: He is obese. Cardiovascular:      Pulses:           Dorsalis pedis pulses are 2+ on the right side and 2+ on the left side. Posterior tibial pulses are 2+ on the right side and 2+ on the left side. Pulmonary:      Effort: Pulmonary effort is normal.   Musculoskeletal:      Right lower leg: No edema.       Left lower leg: Edema present. Right foot: Normal range of motion. No deformity or bunion. Left foot: Normal range of motion. No deformity or bunion. Feet:      Right foot:      Protective Sensation: 10 sites tested. 0 sites sensed. Skin integrity: Skin integrity normal.      Toenail Condition: Right toenails are normal.      Left foot:      Protective Sensation: 10 sites tested. 0 sites sensed. Skin integrity: Callus and dry skin present. Comments: Surgical site noted to the distal left great toe to be intact. Ulcer noted to the left second toe. Lymphadenopathy:      Lower Body: No right inguinal adenopathy. Left inguinal adenopathy present. Skin:     General: Skin is warm. Capillary Refill: Capillary refill takes 2 to 3 seconds. Neurological:      Mental Status: He is alert and oriented to person, place, and time. Psychiatric:         Mood and Affect: Mood and affect normal.         Behavior: Behavior is cooperative. Data Review: No results found for this or any previous visit (from the past 24 hour(s)). Impression:       ICD-10-CM ICD-9-CM    1. Diabetic ulcer of toe of left foot associated with type 2 diabetes mellitus, with bone involvement without evidence of necrosis ([facility])  E11.621 250.80 CULTURE, ANAEROBIC AND AEROBIC    L97.526 707.15       2. Type 2 diabetes mellitus with diabetic polyneuropathy, without long-term current use of insulin ([facility])  E11.42 250.60      357.2       3. Status post amputation of left great toe [facility])  Z89.412 V49.71           Recommendation:     Patient seen and evaluated in the office  Discussed and educated patient regarding their current medical condition. Wound culture taken and will tailor abx as needed  Local wound care performed. Instructed patient to monitor and if his symptoms worsen he is to the office immediately.   Also, AGAIN, highly urged patient to limit his weightbearing for wound healing purposes and to the distal left second toe. Pt verbalized instructions        Gurpreet Swain Prema, [address] [facility], [address] [place] [address] and [facility]  [address] [place] [address]  O: [phone]  F: [phone]  C: [phone]

## 2022-07-29 NOTE — ASSESSMENT & PLAN NOTE
Diabetes has not been well controlled. He is advised to decrease his carbohydrate intake, especially fries.

## 2022-07-29 NOTE — ASSESSMENT & PLAN NOTE
He is not likely to have results from PDE5i unless his testosterone levels are addressed. Rx for tadalafil given.  He has been on it in the past.

## 2022-07-29 NOTE — LETTER
7/29/2022    Patient: Serjio Dial   YOB: 1960   Date of Visit: 7/29/2022     Raeann Smith MD  [address]  [address] [facility] [address]  Via Fax: [phone]    Dear Raeann Smith MD,      Thank you for referring Mr. Pierre Reinoso to [place] [address] for evaluation. My notes for this consultation are attached. If you have questions, please do not hesitate to call me. I look forward to following your patient along with you.       Sincerely,    Lexa Kirkland MD

## 2022-07-29 NOTE — ASSESSMENT & PLAN NOTE
Off therapy since 12/2021. He wants to resume treatment. Check baseline labs now. Testosterone prescribed. He will come in next week for soft injection with my nurse practitioner. Follow-up with me in 1 month with labs.

## 2022-08-01 NOTE — PROGRESS NOTES
HISTORY OF PRESENT ILLNESS  Komal Benoit is a 64 y.o. male. No chief complaint on file. Past Medical History:  PMHx (including negatives):  has a past medical history of Arrhythmia, CAD (coronary artery disease), Diabetes ([address] Diverticulosis, Dysphagia, GERD (gastroesophageal reflux disease), hemorrhoids, Hypercholesterolemia, and Hypertension. PSurgHx:  has a past surgical history that includes pr cardiac surg procedure unlist; hx shoulder arthroscopy (Right, 06/09/2021); hx orthopaedic (Left); hx colonoscopy; and hx orthopaedic (Left). PSocHx:  reports that he has quit smoking. His smoking use included cigarettes. He has a 0.75 pack-year smoking history. He has never used smokeless tobacco. He reports current alcohol use of about 6.0 standard drinks per week. He reports that he does not currently use drugs after having used the following drugs: Marijuana and Cocaine. He wishes to resume testosterone replacement therapy and is here today for that reason. Dr. Alex Garcia would like to see him in follow-up in 1 month time with labs prior. He was to have baseline T levels drawn prior to this injection. They remain low. Dr. Alex Garcia ordered repeat T levels and a hepatic panel for future visit. Chronic Conditions Addressed Today       1. Other male erectile dysfunction     Overview      11/11/2020: Starting summer 2020. He has less confidence in his erections. He can achieve 30-40% tumescence barely enough for penetration. He cannot maintain it. His interest is normal.  It is affecting his relationships. He has bothersome ED. Contributing factors are diabetes and HTN. He was started on tadalafil 5mg daily. Erections went from 2/10 to 4-5/10.     1/14/21: He was started on TRT, testosterone cypionate 200 mg IM q 2 weeks. 3/23/21: Daily tadalafil 5 mg dosing. He has resumed TRT as well. He was not consistently injecting.     10/6/21: tadalafil with less effect off of TRT.    7/29/22: He is not likely to have a result from PDE5i unless his testosterone levels are addressed first.  He has an Rx for tadalafil. We will restart testosterone next week. 2. Low testosterone in male - Primary     Overview      12/9/2020: serum testosterone was 210, though free testosterone was 8.9.  1/13/21: adequate free testosterone levels indicate that TRT may not help. However, he wanted to try. Started on 200 mg IM testosterone cypionate q 2 weeks on 1/14/21. He was not consistently injecting. He wished to restart therapy on 3/23/21.    6/10/21: therapeutic labs in May.      7/13/21: hepatic panel WNL, PSA 0.3, serum T was 106 with free T at 6.9    10/6/21: He has been off of therapy since June when he ran out. Resume dosing 200 mg q 2 weeks, IM.    7/29/22: He has been off of therapy since 12/2021. He wishes to resume his treatment. He was functioning better sexually on replacement therapy. Baseline labs now. ROS  Patient denies the symptoms of COVID-19 per routine screening guidelines. Physical Exam    ASSESSMENT and PLAN  Diagnoses and all orders for this visit:    1. Low testosterone in male    2. Other male erectile dysfunction               Rhoda Pastrana may have a reminder for a \"due or due soon\" health maintenance. The patient has been encouraged to contact their primary care provider for follow-up on this health maintenance or other necessary and/or routine health screening.      Neris Ramirez, NP

## 2022-08-03 LAB
TESTOST FREE SERPL-MCNC: 6.3 PG/ML (ref 6.6–18.1)
TESTOST SERPL-MCNC: 226 NG/DL (ref 264–916)

## 2022-08-05 ENCOUNTER — OFFICE VISIT (OUTPATIENT)
Dept: UROLOGY | Age: 62
End: 2022-08-05
Payer: COMMERCIAL

## 2022-08-05 VITALS
HEIGHT: 76 IN | HEART RATE: 92 BPM | DIASTOLIC BLOOD PRESSURE: 83 MMHG | WEIGHT: 264 LBS | BODY MASS INDEX: 32.15 KG/M2 | SYSTOLIC BLOOD PRESSURE: 168 MMHG

## 2022-08-05 DIAGNOSIS — N52.8 OTHER MALE ERECTILE DYSFUNCTION: ICD-10-CM

## 2022-08-05 DIAGNOSIS — R79.89 LOW TESTOSTERONE IN MALE: Primary | ICD-10-CM

## 2022-08-05 PROCEDURE — 96372 THER/PROPH/DIAG INJ SC/IM: CPT | Performed by: NURSE PRACTITIONER

## 2022-08-05 RX ORDER — HYDROCODONE BITARTRATE AND ACETAMINOPHEN 5; 325 MG/1; MG/1
1 TABLET ORAL
COMMUNITY
Start: 2021-08-04

## 2022-08-05 RX ORDER — TESTOSTERONE CYPIONATE 200 MG/ML
200 INJECTION INTRAMUSCULAR
Status: COMPLETED | OUTPATIENT
Start: 2022-08-05 | End: 2022-08-05

## 2022-08-05 RX ADMIN — TESTOSTERONE CYPIONATE 200 MG: 200 INJECTION INTRAMUSCULAR at 14:01

## 2022-08-05 NOTE — PROGRESS NOTES
Chief Complaint   Patient presents with    Follow-up    Benign Prostatic Hypertrophy    Urinary Frequency    Hypotestosterone     1. Have you been to the ER, urgent care clinic since your last visit? Hospitalized since your last visit? No    2. Have you seen or consulted any other health care providers outside of the [address] [place] [address] since your last visit? Include any pap smears or colon screening.  No    Visit Vitals  BP (!) 168/83 (BP 1 Location: Left upper arm, BP Patient Position: Sitting, BP Cuff Size: Large adult)   Pulse 92   Ht 6' 4\" (1.93 m)   Wt 264 lb (119.7 kg)   BMI 32.14 kg/m²

## 2022-08-05 NOTE — THERAPY DISCHARGE
[address]  Ph: [phone]  Fax: [phone]    Discharge Summary 2-15    Patient name: William Ascencio  : 1960  Provider#: [id number]  Referral source: Steve Briceño NP      Medical/Treatment Diagnosis: Other dorsalgia [M54.89]     Prior Hospitalization: see medical history     Comorbidities: See Plan of Care  Prior Level of Function: See Plan of Care  Medications: Verified on Patient Summary List  Start of Care: 21  Onset Date:chronic    Visits from Start of Care: 2  Missed Visits:   Certification Period : 21-3/230/22    Assessment/Summary of care/GOALS:   Patient did not return for follow up visits or respond to our calls. The last treatment was on , and so the patient will be discharged at this time. Goals were not re-assessed.   Thank you for the referral.     RECOMMENDATIONS:  [x]Discontinue therapy:   []Patient has reached or is progressing toward set goals and is independent with HEP   [x]Patient is non-compliant or has abdicated   []Due to lack of appreciable progress towards set goals   []Patient was hospitalized or suffered illness that impacted ability to continue therapy   []Other:   Polly Wilburn, PT, DPT 2022

## 2022-08-09 ENCOUNTER — TELEPHONE (OUTPATIENT)
Dept: UROLOGY | Age: 62
End: 2022-08-09

## 2022-08-09 NOTE — TELEPHONE ENCOUNTER
Mr. Alka Horne is scheduled for injection on Thursday, which is 6 days from his last dose. He is due every 2 weeks. From Mary's note:  \" testosterone cypionate (DEPOTESTOTERONE CYPIONATE) 200 mg/mL injection; 1 mL by IntraMUSCular route Once every 2 weeks. \"    He will need to be rescheduled to next week. I will send message on [employer] too.

## 2022-08-16 ENCOUNTER — OFFICE VISIT (OUTPATIENT)
Dept: UROLOGY | Age: 62
End: 2022-08-16
Payer: COMMERCIAL

## 2022-08-16 DIAGNOSIS — R79.89 LOW TESTOSTERONE IN MALE: Primary | ICD-10-CM

## 2022-08-16 PROCEDURE — 96372 THER/PROPH/DIAG INJ SC/IM: CPT | Performed by: NURSE PRACTITIONER

## 2022-08-16 RX ORDER — TESTOSTERONE CYPIONATE 200 MG/ML
200 INJECTION INTRAMUSCULAR
Status: COMPLETED | OUTPATIENT
Start: 2022-08-16 | End: 2022-08-16

## 2022-08-16 RX ORDER — TESTOSTERONE CYPIONATE 200 MG/ML
200 INJECTION INTRAMUSCULAR EVERY 2 WEEKS
Qty: 10 ML | Refills: 3 | Status: SHIPPED | OUTPATIENT
Start: 2022-08-16

## 2022-08-16 RX ADMIN — TESTOSTERONE CYPIONATE 200 MG: 200 INJECTION INTRAMUSCULAR at 14:47

## 2022-08-24 NOTE — PROGRESS NOTES
HISTORY OF PRESENT ILLNESS  Esme Curiel is a 64 y.o. male. Chief Complaint   Patient presents with    Follow-up    Injection     Testosterone      Past Medical History:  PMHx (including negatives):  has a past medical history of Arrhythmia, CAD (coronary artery disease), Diabetes ([address] Diverticulosis, Dysphagia, GERD (gastroesophageal reflux disease), hemorrhoids, Hypercholesterolemia, and Hypertension. PSurgHx:  has a past surgical history that includes pr cardiac surg procedure unlist; hx shoulder arthroscopy (Right, 06/09/2021); hx orthopaedic (Left); hx colonoscopy; and hx orthopaedic (Left). PSocHx:  reports that he has quit smoking. His smoking use included cigarettes. He has a 0.75 pack-year smoking history. He has never used smokeless tobacco. He reports current alcohol use of about 6.0 standard drinks per week. He reports that he does not currently use drugs after having used the following drugs: Marijuana and Cocaine. He resumed TRT on 8/5/2022. He is here today in routine follow-up. RIGHT side injection. Chronic Conditions Addressed Today       1. Other male erectile dysfunction     Overview      11/11/2020: Starting summer 2020. He has less confidence in his erections. He can achieve 30-40% tumescence barely enough for penetration. He cannot maintain it. His interest is normal.  It is affecting his relationships. He has bothersome ED. Contributing factors are diabetes and HTN. He was started on tadalafil 5mg daily. Erections went from 2/10 to 4-5/10.     1/14/21: He was started on TRT, testosterone cypionate 200 mg IM q 2 weeks. 3/23/21: Daily tadalafil 5 mg dosing. He has resumed TRT as well. He was not consistently injecting. 10/6/21: tadalafil with less effect off of TRT.    7/29/22: He is not likely to have a result from PDE5i unless his testosterone levels are addressed first.  He has an Rx for tadalafil. We will restart testosterone next week. Relevant Medications     testosterone cypionate (DEPOTESTOTERONE CYPIONATE) injection 200 mg (Completed)    2. Low testosterone in male - Primary     Overview      12/9/2020: serum testosterone was 210, though free testosterone was 8.9.  1/13/21: adequate free testosterone levels indicate that TRT may not help. However, he wanted to try. Started on 200 mg IM testosterone cypionate q 2 weeks on 1/14/21. He was not consistently injecting. He wished to restart therapy on 3/23/21.    6/10/21: therapeutic labs in May.      7/13/21: hepatic panel WNL, PSA 0.3, serum T was 106 with free T at 6.9    10/6/21: He has been off of therapy since June when he ran out. Resume dosing 200 mg q 2 weeks, IM.    7/29/22: He has been off of therapy since 12/2021. He wishes to resume his treatment. He was functioning better sexually on replacement therapy. Baseline labs now. 8/5/22: he resumed injections. Current Assessment & Plan      Resumed injections 8/5/22; he is due for labs previously ordered by Dr. Clista Skiff. He had them done today. ROS  Patient denies the symptoms of COVID-19 per routine screening guidelines. Physical Exam    ASSESSMENT and PLAN  Diagnoses and all orders for this visit:    1. Low testosterone in male  Assessment & Plan:  Resumed injections 8/5/22; he is due for labs previously ordered by Dr. Clista Skiff. He had them done today. 2. Other male erectile dysfunction  -     testosterone cypionate (DEPOTESTOTERONE CYPIONATE) injection 200 mg             Kiel Patton may have a reminder for a \"due or due soon\" health maintenance. The patient has been encouraged to contact their primary care provider for follow-up on this health maintenance or other necessary and/or routine health screening.      Sarah Jarquin NP

## 2022-08-27 NOTE — PROGRESS NOTES
[facility]    Subjective:         Patient is a 58 y.o. male who is being seen as a returning pt for a diabetic wound to the left second toe. He underwent a partial amputation of the left great toe on 5-. This area has healed. He has kept his dressing clean/dry/intact for the left second toe ulcer and cont with wound care as instructed. He has completed with his antibiotics as instructed. Patient states he cont to work and is on his feet many hours throughout the day. He does state he has pain, rising to level of 3 out of 10. He states pain is a throbbing in nature. He admits to mild drainage but denies any systemic signs of infection. He does states his left lower extremity is very swollen. He denies any other pedal complaints      Past Medical History:   Diagnosis Date    Arrhythmia     Hx of AFIB    CAD (coronary artery disease)     Diabetes (HCC)     Diverticulosis     Dysphagia     GERD (gastroesophageal reflux disease)     Hx of hemorrhoids     Hypercholesterolemia     Hypertension      Past Surgical History:   Procedure Laterality Date    HX COLONOSCOPY      HX ORTHOPAEDIC Left     wrist     HX ORTHOPAEDIC Left     big toe amputated    HX SHOULDER ARTHROSCOPY Right 06/09/2021    PA CARDIAC SURG PROCEDURE UNLIST      Atrial fibrilation        Family History   Problem Relation Age of Onset    Cancer Father       Social History     Tobacco Use    Smoking status: Former     Packs/day: 0.25     Years: 3.00     Pack years: 0.75     Types: Cigarettes    Smokeless tobacco: Never   Substance Use Topics    Alcohol use: Yes     Alcohol/week: 6.0 standard drinks     Types: 6 Cans of beer per week     No Known Allergies  Prior to Admission medications    Medication Sig Start Date End Date Taking? Authorizing Provider   testosterone cypionate (DEPOTESTOTERONE CYPIONATE) 200 mg/mL injection 1 mL by IntraMUSCular route Once every 2 weeks.  Max Daily Amount: 200 mg. 8/16/22   Neel Abbasi Vernel Libman, NP   HYDROcodone-acetaminophen (NORCO) 5-325 mg per tablet Take 1 Tablet by mouth every six (6) hours as needed. 8/4/21   Provider, Historical   tamsulosin (FLOMAX) 0.4 mg capsule Take 1 Capsule by mouth in the morning. 7/29/22   Lion Miller MD   tadalafiL (CIALIS) 20 mg tablet Take 1 Tablet by mouth as needed for Erectile Dysfunction. 7/29/22   Lion Miller MD   ibuprofen (MOTRIN) 600 mg tablet Take 1 Tablet by mouth every six (6) hours as needed for Pain. 5/31/22   Joan Nava DPM   cyclobenzaprine (FLEXERIL) 10 mg tablet Take 1 Tablet by mouth three (3) times daily as needed for Muscle Spasm(s). 5/31/22   Joan Nava DPM   cadexomer iodine (Iodosorb) 0.9 % gel Use with daily wound care 5/31/22   Joan Nava DPM   insulin glargine (LANTUS) 100 unit/mL injection 15 unit bid 5/19/22   Leanna Martinez MD   lisinopriL (PRINIVIL, ZESTRIL) 5 mg tablet Take 5 mg by mouth daily. 3/11/21   Provider, Historical   omeprazole (PRILOSEC) 40 mg capsule Take 40 mg by mouth daily. Provider, Historical   atorvastatin (LIPITOR) 20 mg tablet Take 20 mg by mouth daily. Provider, Historical   empagliflozin (JARDIANCE) 10 mg tablet Take 10 mg by mouth daily. Provider, Historical   metoprolol tartrate (LOPRESSOR) 100 mg IR tablet Take 100 mg by mouth two (2) times a day. Provider, Historical   aspirin delayed-release 81 mg tablet Take 81 mg by mouth daily. Provider, Historical       Review of Systems   Constitutional: Negative. HENT: Negative. Eyes: Negative. Respiratory: Negative. Cardiovascular: Negative. Gastrointestinal: Negative. Endocrine: Negative. Genitourinary: Negative. Musculoskeletal: Negative. Allergic/Immunologic: Positive for immunocompromised state. Neurological:  Positive for numbness. Hematological: Negative. Psychiatric/Behavioral: Negative. All other systems reviewed and are negative. Objective:      There were no vitals taken for this visit. Physical Exam  Vitals reviewed. Constitutional:       Appearance: He is obese. Cardiovascular:      Pulses:           Dorsalis pedis pulses are 2+ on the right side and 2+ on the left side. Posterior tibial pulses are 2+ on the right side and 2+ on the left side. Pulmonary:      Effort: Pulmonary effort is normal.   Musculoskeletal:      Right lower leg: No edema. Left lower leg: Edema present. Right foot: Normal range of motion. No deformity or bunion. Left foot: Normal range of motion. No deformity or bunion. Feet:      Right foot:      Protective Sensation: 10 sites tested. 0 sites sensed. Skin integrity: Skin integrity normal.      Toenail Condition: Right toenails are normal.      Left foot:      Protective Sensation: 10 sites tested. 0 sites sensed. Skin integrity: Callus and dry skin present. Comments: Surgical site noted to the distal left great toe to be intact. Ulcer noted to the left second toe. Lymphadenopathy:      Lower Body: No right inguinal adenopathy. Left inguinal adenopathy present. Skin:     General: Skin is warm. Capillary Refill: Capillary refill takes 2 to 3 seconds. Neurological:      Mental Status: He is alert and oriented to person, place, and time. Psychiatric:         Mood and Affect: Mood and affect normal.         Behavior: Behavior is cooperative. Data Review: No results found for this or any previous visit (from the past 24 hour(s)). Impression:       ICD-10-CM ICD-9-CM    1. Type 2 diabetes mellitus with diabetic polyneuropathy, without long-term current use of insulin ([facility])  E11.42 250.60      357.2       2. Diabetic ulcer of toe of left foot associated with type 2 diabetes mellitus, with necrosis of bone ([facility])  E11.621 250.80     L97.524 707.15      730.17       3.  Other acute osteomyelitis of left foot ([facility]  M86.172 730.07               Recommendation:     Patient seen and evaluated in the office  Discussed and educated patient regarding their current medical condition. Tx options reviewed, conservative or surgical.  Possible complications of each treatment option discussed in great detail. Patient elected for incision of bone cortex to the left great toe. Written and verbal consent obtained  The left foot was scrubbed and draped in sterile fashion. With a 15 blade, an incision was made down to the level of bone to the distal aspect of the left great toe. With a bone cutter, an incision was made in the cortex of the distal and middle phalanges of the left second toe to allow for drainage and antibiotic access. Wound care performed. Home wound care instructions given. Instructed patient to monitor and if his symptoms worsen he is to the office immediately. Also, AGAIN, highly urged patient to limit his weightbearing for wound healing purposes and to the distal left second toe. Pt verbalized instructions        Gurpreet Blanco, [facility], [address] [place] [address] and [facility]  [address] [place] [address]  O: [phone]  F: [phone]  C: [phone]

## 2022-08-28 PROBLEM — Z12.5 PROSTATE CANCER SCREENING: Status: RESOLVED | Noted: 2021-09-21 | Resolved: 2022-08-28

## 2022-08-29 DIAGNOSIS — R79.89 LOW TESTOSTERONE IN MALE: ICD-10-CM

## 2022-08-30 ENCOUNTER — OFFICE VISIT (OUTPATIENT)
Dept: UROLOGY | Age: 62
End: 2022-08-30
Payer: COMMERCIAL

## 2022-08-30 DIAGNOSIS — N52.8 OTHER MALE ERECTILE DYSFUNCTION: ICD-10-CM

## 2022-08-30 DIAGNOSIS — R79.89 LOW TESTOSTERONE IN MALE: Primary | ICD-10-CM

## 2022-08-30 PROCEDURE — 96372 THER/PROPH/DIAG INJ SC/IM: CPT | Performed by: NURSE PRACTITIONER

## 2022-08-30 RX ORDER — TESTOSTERONE CYPIONATE 200 MG/ML
200 INJECTION INTRAMUSCULAR
Status: COMPLETED | OUTPATIENT
Start: 2022-08-30 | End: 2022-08-30

## 2022-08-30 RX ADMIN — TESTOSTERONE CYPIONATE 200 MG: 200 INJECTION INTRAMUSCULAR at 15:00

## 2022-08-30 NOTE — ASSESSMENT & PLAN NOTE
Resumed injections 8/5/22; he is due for labs previously ordered by Dr. Nehemiah Licea. He had them done today.

## 2022-09-03 LAB
ALBUMIN SERPL-MCNC: 4.8 G/DL (ref 3.8–4.8)
ALP SERPL-CCNC: 89 IU/L (ref 44–121)
ALT SERPL-CCNC: 25 IU/L (ref 0–44)
AST SERPL-CCNC: 19 IU/L (ref 0–40)
BILIRUB DIRECT SERPL-MCNC: 0.15 MG/DL (ref 0–0.4)
BILIRUB SERPL-MCNC: 0.5 MG/DL (ref 0–1.2)
PROT SERPL-MCNC: 7.4 G/DL (ref 6–8.5)
TESTOST FREE SERPL-MCNC: 14.2 PG/ML (ref 6.6–18.1)
TESTOST SERPL-MCNC: 349 NG/DL (ref 264–916)

## 2022-09-16 ENCOUNTER — OFFICE VISIT (OUTPATIENT)
Dept: UROLOGY | Age: 62
End: 2022-09-16
Payer: COMMERCIAL

## 2022-09-16 VITALS
OXYGEN SATURATION: 100 % | WEIGHT: 264 LBS | TEMPERATURE: 97.8 F | SYSTOLIC BLOOD PRESSURE: 147 MMHG | HEIGHT: 76 IN | DIASTOLIC BLOOD PRESSURE: 88 MMHG | BODY MASS INDEX: 32.15 KG/M2 | HEART RATE: 100 BPM

## 2022-09-16 DIAGNOSIS — Z12.5 PROSTATE CANCER SCREENING: ICD-10-CM

## 2022-09-16 DIAGNOSIS — N52.8 OTHER MALE ERECTILE DYSFUNCTION: ICD-10-CM

## 2022-09-16 DIAGNOSIS — E66.9 OBESITY (BMI 30.0-34.9): ICD-10-CM

## 2022-09-16 DIAGNOSIS — R35.0 URINARY FREQUENCY: ICD-10-CM

## 2022-09-16 DIAGNOSIS — R79.89 LOW TESTOSTERONE IN MALE: Primary | ICD-10-CM

## 2022-09-16 PROBLEM — E66.811 OBESITY (BMI 30.0-34.9): Status: ACTIVE | Noted: 2022-09-16

## 2022-09-16 PROCEDURE — 99214 OFFICE O/P EST MOD 30 MIN: CPT | Performed by: UROLOGY

## 2022-09-16 NOTE — PROGRESS NOTES
April left another  requesting writer to call her back.    Writer returned call to April, writer discussed Dr. Wu's recommendations with her.  April has no further questions.  April aware that at this point, will continue with Warfarin.   Chief Complaint   Patient presents with    Follow-up    Hyperthyroidism    Erectile Dysfunction       PHQ-9 score is    Negative    Vitals:    09/16/22 1135   BP: (!) 147/88   Pulse: 100   Temp: 97.8 °F (36.6 °C)   TempSrc: Temporal   SpO2: 100%   Weight: 264 lb (119.7 kg)   Height: 6' 4\" (1.93 m)   PainSc:   0 - No pain        1. \"Have you been to the ER, urgent care clinic since your last visit? Hospitalized since your last visit? \" No    2. \"Have you seen or consulted any other health care providers outside of the [address] [place] [address] since your last visit? \" No     3. For patients aged 39-70: Has the patient had a colonoscopy / FIT/ Cologuard? No      If the patient is female:    4. For patients aged 41-77: Has the patient had a mammogram within the past 2 years? No      5. For patients aged 21-65: Has the patient had a pap smear?  No

## 2022-09-16 NOTE — ASSESSMENT & PLAN NOTE
He had about 3 injections. He has adequate levels. He has normal libido without erections. He is not happy with his ED.

## 2022-09-16 NOTE — ASSESSMENT & PLAN NOTE
He is interested in ED. He is wants to try a VINITA. He would try injections vs IPP if not improved.

## 2022-09-16 NOTE — PROGRESS NOTES
HISTORY OF PRESENT ILLNESS  Taty Salomon is a 58 y.o. male. Chief Complaint   Patient presents with    Follow-up    Hyperthyroidism    Erectile Dysfunction     Past Medical History:  PMHx (including negatives):  has a past medical history of Arrhythmia, CAD (coronary artery disease), Diabetes ([address] Diverticulosis, Dysphagia, GERD (gastroesophageal reflux disease), hemorrhoids, Hypercholesterolemia, and Hypertension. PSurgHx:  has a past surgical history that includes pr cardiac surg procedure unlist; hx shoulder arthroscopy (Right, 06/09/2021); hx orthopaedic (Left); hx colonoscopy; and hx orthopaedic (Left). PSocHx:  reports that he has quit smoking. His smoking use included cigarettes. He has a 0.75 pack-year smoking history. He has never used smokeless tobacco. He reports current alcohol use of about 6.0 standard drinks per week. He reports that he does not currently use drugs after having used the following drugs: Marijuana and Cocaine. He has trouble with erectile function. He is on tadalafil and restarted testosterone replacement therapy on 8/5/22. He has had 2 injections and is here today in follow up. Repeat labs on 8/30/22 showed normal T levels. Hepatic function ok. Chronic Conditions Addressed Today       1. Other male erectile dysfunction     Overview      11/11/2020: Starting summer 2020. He has less confidence in his erections. He can achieve 30-40% tumescence barely enough for penetration. He cannot maintain it. His interest is normal.  It is affecting his relationships. He has bothersome ED. Contributing factors are diabetes and HTN. He was started on tadalafil 5mg daily. Erections went from 2/10 to 4-5/10.     1/14/21: He was started on TRT, testosterone cypionate 200 mg IM q 2 weeks. 3/23/21: Daily tadalafil 5 mg dosing. He has resumed TRT as well. He was not consistently injecting.     10/6/21: tadalafil with less effect off of TRT.    7/29/22: He is not likely to have a result from PDE5i unless his testosterone levels are addressed first.  He has an Rx for tadalafil. We will restart testosterone next week. Current Assessment & Plan      He is interested in ED. He is wants to try a VINITA. He would try injections vs IPP if not improved. 2. Low testosterone in male - Primary     Overview      12/9/2020: serum testosterone was 210, though free testosterone was 8.9.  1/13/21: adequate free testosterone levels indicate that TRT may not help. However, he wanted to try. Started on 200 mg IM testosterone cypionate q 2 weeks on 1/14/21. He was not consistently injecting. He wished to restart therapy on 3/23/21.    6/10/21: therapeutic labs in May.      7/13/21: hepatic panel WNL, PSA 0.3, serum T was 106 with free T at 6.9    10/6/21: He has been off of therapy since June when he ran out. Resume dosing 200 mg q 2 weeks, IM.    7/29/22: He has been off of therapy since 12/2021. He wishes to resume his treatment. He was functioning better sexually on replacement therapy. Baseline labs now. 8/5/22: he resumed injections. Current Assessment & Plan       He had about 3 injections. He has adequate levels. He has normal libido without erections. He is not happy with his ED. 3. Prostate cancer screening     Overview      PSA 12/9/2020 was 0.3  PSA 7/13/21 was 0.3  PSA 10/5/21: 0.3          Current Assessment & Plan       Due for a PSA. Relevant Orders     PSA, DIAGNOSTIC (PROSTATE SPECIFIC AG)    4. Urinary frequency     Overview      6/16/21: presents to the ED with cc of frequency in urination over the last couple of days. Patient reports he has been getting up to urinate 8-10 times a night. Associated symptoms include polydipsia. He is also complaining of constipation. Urine culture with no growth. Current Assessment & Plan                5. Obesity (BMI 30.0-34. 9)     Current Assessment & Plan       He is losing weight. He should consider his cardiovascular health and start exercise. ROS  Patient denies the symptoms of COVID-19 per routine screening guidelines. Physical Exam    ASSESSMENT and PLAN  Diagnoses and all orders for this visit:    1. Low testosterone in male  Assessment & Plan:   He had about 3 injections. He has adequate levels. He has normal libido without erections. He is not happy with his ED. 2. Other male erectile dysfunction  Assessment & Plan:  He is interested in ED. He is wants to try a VINITA. He would try injections vs IPP if not improved. 3. Prostate cancer screening  Assessment & Plan:   Due for a PSA. Orders:  -     PSA, DIAGNOSTIC (PROSTATE SPECIFIC AG)    4. Urinary frequency  Assessment & Plan:         5. Obesity (BMI 30.0-34. 9)  Assessment & Plan:   He is losing weight. He should consider his cardiovascular health and start exercise. Pamela Baron may have a reminder for a \"due or due soon\" health maintenance. The patient has been encouraged to contact their primary care provider for follow-up on this health maintenance or other necessary and/or routine health screening.      Agapito Oneill MD

## 2022-10-14 ENCOUNTER — HOSPITAL ENCOUNTER (EMERGENCY)
Age: 62
Discharge: HOME | End: 2022-10-14
Attending: STUDENT IN AN ORGANIZED HEALTH CARE EDUCATION/TRAINING PROGRAM
Payer: COMMERCIAL

## 2022-10-14 ENCOUNTER — APPOINTMENT (OUTPATIENT)
Dept: GENERAL RADIOLOGY | Age: 62
End: 2022-10-14
Attending: STUDENT IN AN ORGANIZED HEALTH CARE EDUCATION/TRAINING PROGRAM
Payer: COMMERCIAL

## 2022-10-14 ENCOUNTER — APPOINTMENT (OUTPATIENT)
Dept: CT IMAGING | Age: 62
End: 2022-10-14
Attending: STUDENT IN AN ORGANIZED HEALTH CARE EDUCATION/TRAINING PROGRAM
Payer: COMMERCIAL

## 2022-10-14 VITALS
TEMPERATURE: 97.7 F | HEIGHT: 74 IN | BODY MASS INDEX: 31.06 KG/M2 | RESPIRATION RATE: 16 BRPM | OXYGEN SATURATION: 96 % | WEIGHT: 242 LBS | SYSTOLIC BLOOD PRESSURE: 124 MMHG | DIASTOLIC BLOOD PRESSURE: 89 MMHG | HEART RATE: 95 BPM

## 2022-10-14 DIAGNOSIS — T14.8XXA CONTUSION OF SOFT TISSUE: Primary | ICD-10-CM

## 2022-10-14 PROCEDURE — 99284 EMERGENCY DEPT VISIT MOD MDM: CPT

## 2022-10-14 PROCEDURE — 73562 X-RAY EXAM OF KNEE 3: CPT

## 2022-10-14 PROCEDURE — 70450 CT HEAD/BRAIN W/O DYE: CPT

## 2022-10-14 PROCEDURE — 74011250637 HC RX REV CODE- 250/637: Performed by: STUDENT IN AN ORGANIZED HEALTH CARE EDUCATION/TRAINING PROGRAM

## 2022-10-14 PROCEDURE — 73130 X-RAY EXAM OF HAND: CPT

## 2022-10-14 RX ORDER — METOCLOPRAMIDE 10 MG/1
10 TABLET ORAL
Qty: 12 TABLET | Refills: 0 | Status: SHIPPED | OUTPATIENT
Start: 2022-10-14 | End: 2022-10-24

## 2022-10-14 RX ORDER — METOCLOPRAMIDE 10 MG/1
10 TABLET ORAL
Status: COMPLETED | OUTPATIENT
Start: 2022-10-14 | End: 2022-10-14

## 2022-10-14 RX ORDER — METHOCARBAMOL 750 MG/1
1500 TABLET, FILM COATED ORAL ONCE
Status: COMPLETED | OUTPATIENT
Start: 2022-10-14 | End: 2022-10-14

## 2022-10-14 RX ORDER — ACETAMINOPHEN 325 MG/1
650 TABLET ORAL
Qty: 20 TABLET | Refills: 0 | Status: SHIPPED | OUTPATIENT
Start: 2022-10-14

## 2022-10-14 RX ORDER — METHOCARBAMOL 750 MG/1
750 TABLET, FILM COATED ORAL
Qty: 20 TABLET | Refills: 0 | Status: SHIPPED | OUTPATIENT
Start: 2022-10-14

## 2022-10-14 RX ORDER — ACETAMINOPHEN 500 MG
1000 TABLET ORAL
Status: COMPLETED | OUTPATIENT
Start: 2022-10-14 | End: 2022-10-14

## 2022-10-14 RX ADMIN — METOCLOPRAMIDE 10 MG: 10 TABLET ORAL at 07:05

## 2022-10-14 RX ADMIN — ACETAMINOPHEN 1000 MG: 500 TABLET ORAL at 06:20

## 2022-10-14 RX ADMIN — METHOCARBAMOL 1500 MG: 750 TABLET ORAL at 06:20

## 2022-10-14 NOTE — ED TRIAGE NOTES
Pt reports he tripped over a cord last night and injured his right hand, right knee, and hit his head.

## 2022-10-14 NOTE — ED PROVIDER NOTES
[id number]  EMERGENCY DEPARTMENT ENCOUNTER NOTE    Date: 10/14/2022  Patient Name: Speedy Ramos    History of Presenting Illness     Chief Complaint   Patient presents with    Fall     HPI: Speedy Ramos, 58 y.o. male with a past medical history and outpatient medications as listed and reviewed below  presents for fall. The patient tripped on a cord, fell down on his right side and sustained right hand second MCP pain as well as right knee pain. He also hit his head. No loss of consciousness, nausea, or vomiting. On aspirin at home but no other blood thinners. Complains of a frontal headache now. Did not have any headache on initial impact. Incident happened yesterday. No nausea or vomiting. Had normal ambulation prior to presentation. No weakness or numbness in the lower extremities. No neck pain.     Medical History   I reviewed the medical, surgical, family, and social history, as well as allergies:    PCP: Joes Manuel Lopez MD    Past Medical History:  Past Medical History:   Diagnosis Date    Arrhythmia     Hx of AFIB    Atrial fibrillation ([facility] [address]     CAD (coronary artery disease)     Diabetes ([facility] [address]     Diverticulosis     Dysphagia     GERD (gastroesophageal reflux disease)     Hx of hemorrhoids     Hypercholesterolemia     Hypertension      Past Surgical History:  Past Surgical History:   Procedure Laterality Date    HX COLONOSCOPY      HX ORTHOPAEDIC Left     wrist     HX ORTHOPAEDIC Left     big toe amputated    HX SHOULDER ARTHROSCOPY Right 06/09/2021    DE CARDIAC SURG PROCEDURE UNLIST      Atrial fibrilation      Current Outpatient Medications:  Current Outpatient Medications   Medication Instructions    acetaminophen (TYLENOL) 650 mg, Oral, 4 TIMES DAILY AS NEEDED    aspirin delayed-release 81 mg, Oral, DAILY    atorvastatin (LIPITOR) 20 mg, Oral, DAILY    cadexomer iodine (Iodosorb) 0.9 % gel Use with daily wound care    cyclobenzaprine (FLEXERIL) 10 mg, Oral, 3 TIMES DAILY AS NEEDED    empagliflozin (JARDIANCE) 10 mg, Oral, DAILY    HYDROcodone-acetaminophen (NORCO) 5-325 mg per tablet 1 Tablet, Oral, EVERY 6 HOURS AS NEEDED    ibuprofen (MOTRIN) 600 mg, Oral, EVERY 6 HOURS AS NEEDED    insulin glargine (LANTUS) 100 unit/mL injection 15 unit bid    lisinopriL (PRINIVIL, ZESTRIL) 5 mg, Oral, DAILY    methocarbamoL (ROBAXIN-750) 750 mg, Oral, 3 TIMES DAILY AS NEEDED    metoclopramide HCl (REGLAN) 10 mg, Oral, EVERY 6 HOURS AS NEEDED    metoprolol tartrate (LOPRESSOR) 100 mg, Oral, 2 TIMES DAILY    omeprazole (PRILOSEC) 40 mg, Oral, DAILY    tadalafiL (CIALIS) 20 mg, Oral, AS NEEDED    tamsulosin (FLOMAX) 0.4 mg, Oral, DAILY    testosterone cypionate (DEPOTESTOTERONE CYPIONATE) 200 mg, IntraMUSCular, EVERY 2 WEEKS      Family History:  Family History   Problem Relation Age of Onset    Cancer Father      Social History:  Social History     Tobacco Use    Smoking status: Former     Packs/day: 0.25     Years: 3.00     Pack years: 0.75     Types: Cigarettes    Smokeless tobacco: Never   Vaping Use    Vaping Use: Never used   Substance Use Topics    Alcohol use: Yes     Alcohol/week: 6.0 standard drinks     Types: 6 Cans of beer per week    Drug use: Yes     Types: Marijuana     Allergies:  No Known Allergies    Review of Systems     Review of Systems  Negative: Positives and pertinent negatives as per HPI. All other systems were reviewed and are negative. Physical Exam & Vital Signs   Vital Signs - I reviewed the patient's vital signs.     Patient Vitals for the past 12 hrs:   Temp Pulse Resp BP SpO2   10/14/22 0554 97.7 °F (36.5 °C) 95 18 (!) 139/91 95 %     Physical Exam:    GENERAL: awake, alert, cooperative, not in distress  HEENT:  * Pupils equal, EOMI  * Head atraumatic  * No C spine trauma  CV:  * audible heart sounds  * warm and perfused extremities bilaterally  PULMONARY: Good air movement, no wheezes, no crackles  ABDOMEN/: soft, no distension, no guarding, no abdominal tenderness  EXTREMITIES/BACK: warm and perfused, no tenderness, no edema  JOINT: R Knee  * No swelling, no erythema. * No abrasions, no lacerations  * No warmth to palpation  * Noted lateral knee tenderness to palpation  * No passive ROM limitation  * No active ROM limitation  * No instability or laxity on varus or valgus stress  * No abmormlaities on neurovascular exam including DP, PT pulses and sensation over lower extremity dermatomes. * Gait is intact, patient able to bear weight  SKIN: no rashes or signs of trauma  NEURO:  * Speech clear  * Moves U&LE to command    Medical Decision Making     Patient is a 58 y.o. male presenting for fall. Vitals reveal no significant abnormalities and physical exam reveals no significant abnormalities. Based on the history, physical exam, risk factors, and vital signs, differential includes: ICH, fracture, soft tissue contusion. See ED Course and Reassessment for evaluation and discussion. EMR Automatically Imported Results     Labs:  No results found for this or any previous visit (from the past 12 hour(s)). Radiologic Studies:  CT Results  (Last 48 hours)                 10/14/22 0631  CT HEAD WO CONT Final result    Impression:  No acute intracranial process. Narrative:  CLINICAL HISTORY: fall, headache   INDICATION: fall, headache   COMPARISON: None. CT dose reduction was achieved through use of a standardized protocol tailored   for this examination and automatic exposure control for dose modulation. TECHNIQUE: Serial axial images with a collimation of 5 mm were obtained from the   skull base through the vertex     FINDINGS:    The sulci and ventricles are within normal limits for patient age. There is no   evidence of an acute infarction, hemorrhage, or mass-effect. There is no   evidence of midline shift or hydrocephalus. Posterior fossa structures are   unremarkable. No extra-axial collections are seen.    Mastoid air cells are well pneumatized and clear. There is no evidence of depressed skull fractures of soft tissue swelling. CXR Results  (Last 48 hours)      None          Medications ordered:  Medications   metoclopramide HCl (REGLAN) tablet 10 mg (has no administration in time range)   methocarbamoL (ROBAXIN) tablet 1,500 mg (1,500 mg Oral Given 10/14/22 0620)   acetaminophen (TYLENOL) tablet 1,000 mg (1,000 mg Oral Given 10/14/22 3038)       ED Course & Reassessment     ED Course:     ED Course as of 10/14/22 0650   Fri Oct 14, 2022   9139 Head CT was negative for acute process making acute intracranial bleed unlikely. No evidence of large subacute stroke, ventriculomegaly, or masses. XR knee and hand normal. [SS]      ED Course User Index  [SS] Memory Gilmer Molina MD       Reassessment:    Understanding was insured that at this time there is no evidence for a more malignant underlying process, but that early in the process of an illness, an emergency department workup can be falsely reassuring. Routine discharge counseling was given including the fact that any worsening, changing or persistent symptoms should prompt an immediate call or follow up with their primary physician or the emergency department. The importance of appropriate follow up was also discussed. More extensive discharge instructions were given in the patient's discharge paperwork. After completion of evaluation and discussion of results and diagnoses, all the questions were answered. If required, all follow up appointments and treatments were discussed and explained. Understanding was insured prior to discharge. Final Disposition     Discharge: DISCHARGED FROM EMERGENCY DEPARTMENT    Patient will be discharged from the Emergency Department in stable condition. All of the diagnostic tests were reviewed and any questions were answered. Diagnosis, results, follow up if applicable, and return precautions were discussed.  I have also put together printed discharge instructions for them that include: 1) educational information regarding their diagnosis, 2) how to care for their diagnosis at home, as well a 3) list of reasons why they would want to return to the ED prior to their follow-up appointment, should their condition change. Any labs or imaging done in the ED will be either printed with the discharge paperwork or available through [address] DISCHARGE PLAN:  1. Current Discharge Medication List        CONTINUE these medications which have NOT CHANGED    Details   testosterone cypionate (DEPOTESTOTERONE CYPIONATE) 200 mg/mL injection 1 mL by IntraMUSCular route Once every 2 weeks. Max Daily Amount: 200 mg. Qty: 10 mL, Refills: 3    Associated Diagnoses: Low testosterone in male      HYDROcodone-acetaminophen (NORCO) 5-325 mg per tablet Take 1 Tablet by mouth every six (6) hours as needed. tamsulosin (FLOMAX) 0.4 mg capsule Take 1 Capsule by mouth in the morning. Qty: 90 Capsule, Refills: 3      tadalafiL (CIALIS) 20 mg tablet Take 1 Tablet by mouth as needed for Erectile Dysfunction. Qty: 30 Tablet, Refills: 5      ibuprofen (MOTRIN) 600 mg tablet Take 1 Tablet by mouth every six (6) hours as needed for Pain. Qty: 20 Tablet, Refills: 1      cyclobenzaprine (FLEXERIL) 10 mg tablet Take 1 Tablet by mouth three (3) times daily as needed for Muscle Spasm(s). Qty: 20 Tablet, Refills: 0      cadexomer iodine (Iodosorb) 0.9 % gel Use with daily wound care  Qty: 45 g, Refills: 2      insulin glargine (LANTUS) 100 unit/mL injection 15 unit bid  Qty: 1 mL, Refills: 1      lisinopriL (PRINIVIL, ZESTRIL) 5 mg tablet Take 5 mg by mouth daily. omeprazole (PRILOSEC) 40 mg capsule Take 40 mg by mouth daily. atorvastatin (LIPITOR) 20 mg tablet Take 20 mg by mouth daily. empagliflozin (JARDIANCE) 10 mg tablet Take 10 mg by mouth daily. metoprolol tartrate (LOPRESSOR) 100 mg IR tablet Take 100 mg by mouth two (2) times a day. aspirin delayed-release 81 mg tablet Take 81 mg by mouth daily. 2.   Follow-up Information       Follow up With Specialties Details Why Contact Info    Lizbeth Trotter MD Family Medicine Schedule an appointment as soon as possible for a visit in 3 days  [address]  [address] [address] [facility] [address]      [address] [facility] DEPT Emergency Medicine Go to  If symptoms worsen [address] [facility] [address] [phone]          3. Return to ED if worse    4. Current Discharge Medication List        START taking these medications    Details   acetaminophen (TYLENOL) 325 mg tablet Take 2 Tablets by mouth four (4) times daily as needed for Pain. Qty: 20 Tablet, Refills: 0  Start date: 10/14/2022      metoclopramide HCl (Reglan) 10 mg tablet Take 1 Tablet by mouth every six (6) hours as needed for Nausea or Headache for up to 10 days. Qty: 12 Tablet, Refills: 0  Start date: 10/14/2022, End date: 10/24/2022      methocarbamoL (Robaxin-750) 750 mg tablet Take 1 Tablet by mouth three (3) times daily as needed for Muscle Spasm(s). Qty: 20 Tablet, Refills: 0  Start date: 10/14/2022           Diagnosis     Clinical Impression:   1. Contusion of soft tissue        Attestations:  Lucas Villar MD    Documentation Comments   - I am the first and primary provider for this patient and am the primary provider of record. - Initial assessment performed. The patients presenting problems have been discussed, and the staff are in agreement with the care plan formulated and outlined with them. I have encouraged them to ask questions as they arise throughout their visit. - Available medical records, nursing notes, old EKGs, and EMS run sheets (if patient was EMS transported) were reviewed    Please note that this dictation was completed with [employer], the [employer] voice recognition software.   Quite often unanticipated grammatical, syntax, homophones, and other interpretive errors are inadvertently transcribed by the computer software. Please disregard these errors. Please excuse any errors that have escaped final proofreading.

## 2022-10-14 NOTE — DISCHARGE INSTRUCTIONS
Thank you! Thank you for allowing me to care for you in the emergency department. I sincerely hope that you are satisfied with your visit today. It is my goal to provide you with excellent care. Below you will find a list of your labs and imaging from your visit today if applicable. Should you have any questions regarding these results please do not hesitate to call the emergency department. Please review [facility] for a more detailed result list since the below list may not be comprehensive. Instructions on how to sign up to [employer] should be provided in this packet. Labs -   No results found for this or any previous visit (from the past 12 hour(s)). Radiologic Studies -   CT HEAD WO CONT   Final Result   No acute intracranial process. XR KNEE RT 3 V   Final Result   No acute abnormality. XR HAND RT MIN 3 V   Final Result   No acute abnormality. CT Results  (Last 48 hours)                 10/14/22 0631  CT HEAD WO CONT Final result    Impression:  No acute intracranial process. Narrative:  CLINICAL HISTORY: fall, headache   INDICATION: fall, headache   COMPARISON: None. CT dose reduction was achieved through use of a standardized protocol tailored   for this examination and automatic exposure control for dose modulation. TECHNIQUE: Serial axial images with a collimation of 5 mm were obtained from the   skull base through the vertex     FINDINGS:    The sulci and ventricles are within normal limits for patient age. There is no   evidence of an acute infarction, hemorrhage, or mass-effect. There is no   evidence of midline shift or hydrocephalus. Posterior fossa structures are   unremarkable. No extra-axial collections are seen. Mastoid air cells are well pneumatized and clear. There is no evidence of depressed skull fractures of soft tissue swelling.                  CXR Results  (Last 48 hours)      None               If you feel that you have not received excellent quality care or timely care, please ask to speak to the nurse manager. Please choose us in the future for your continued health care needs. ------------------------------------------------------------------------------------------------------------  The exam and treatment you received in the Emergency Department were for an urgent problem and are not intended as complete care. It is important that you follow-up with a doctor, nurse practitioner, or physician assistant to:  (1) confirm your diagnosis,  (2) re-evaluation of changes in your illness and treatment, and  (3) for ongoing care. If your symptoms become worse or you do not improve as expected and you are unable to reach your usual health care provider, you should return to the Emergency Department. We are available 24 hours a day. Please take your discharge instructions with you when you go to your follow-up appointment. If a prescription has been provided, please have it filled as soon as possible to prevent a delay in treatment. Read the entire medication instruction sheet provided to you by the pharmacy. If you have any questions or reservations about taking the medication due to side effects or interactions with other medications, please call your primary care physician or contact the ER to speak with the charge nurse. Make an appointment with your family doctor or the physician you were referred to for follow-up of this visit as instructed on your discharge paperwork, as this is a mandatory follow-up. Return to the ER if you are unable to be seen or if you are unable to be seen in a timely manner. If you have any problem arranging the follow-up visit, contact the Emergency Department immediately.

## 2022-11-30 ENCOUNTER — OFFICE VISIT (OUTPATIENT)
Dept: PODIATRY | Age: 62
End: 2022-11-30
Payer: MEDICAID

## 2022-11-30 ENCOUNTER — TELEPHONE (OUTPATIENT)
Dept: UROLOGY | Age: 62
End: 2022-11-30

## 2022-11-30 VITALS
HEIGHT: 74 IN | HEART RATE: 101 BPM | DIASTOLIC BLOOD PRESSURE: 78 MMHG | SYSTOLIC BLOOD PRESSURE: 125 MMHG | BODY MASS INDEX: 31.06 KG/M2 | TEMPERATURE: 96.9 F | WEIGHT: 242 LBS

## 2022-11-30 DIAGNOSIS — E11.42 TYPE 2 DIABETES MELLITUS WITH DIABETIC POLYNEUROPATHY, WITHOUT LONG-TERM CURRENT USE OF INSULIN (HCC): Primary | ICD-10-CM

## 2022-11-30 DIAGNOSIS — L60.3 ONYCHODYSTROPHY: ICD-10-CM

## 2022-11-30 DIAGNOSIS — Z89.422 HISTORY OF PARTIAL AMPUTATION OF TOE OF LEFT FOOT (HCC): ICD-10-CM

## 2022-11-30 PROCEDURE — 3078F DIAST BP <80 MM HG: CPT | Performed by: PODIATRIST

## 2022-11-30 PROCEDURE — 3046F HEMOGLOBIN A1C LEVEL >9.0%: CPT | Performed by: PODIATRIST

## 2022-11-30 PROCEDURE — 3074F SYST BP LT 130 MM HG: CPT | Performed by: PODIATRIST

## 2022-11-30 PROCEDURE — 11721 DEBRIDE NAIL 6 OR MORE: CPT | Performed by: PODIATRIST

## 2022-11-30 PROCEDURE — 99213 OFFICE O/P EST LOW 20 MIN: CPT | Performed by: PODIATRIST

## 2022-11-30 NOTE — TELEPHONE ENCOUNTER
PT WANTS TO KNOW IF THEY ARE ABLE TO USE THEIR TESTOSTERONE VIAL THAT THEY HAVE SOME LEFT IN REFILL BY  01/27/2023   PT ALSO WANTS TO KNOW IF THEY ARE ABLE TO GET A LAB ORDER SENT TO GET LABS DONE , SPECIFICALLY ASKED FOR TESTOSTERONE LEVELS TO BE TESTED

## 2022-12-05 NOTE — PROGRESS NOTES
HISTORY OF PRESENT ILLNESS  Roxie Castellon is a 58 y.o. male. Chief Complaint   Patient presents with    Follow-up    Hypogonadism    Erectile Dysfunction    Annual Exam    Urinary Frequency     Past Medical History:  PMHx (including negatives):  has a past medical history of Arrhythmia, Atrial fibrillation ([address] CAD (coronary artery disease), Diabetes ([address] Diverticulosis, Dysphagia, GERD (gastroesophageal reflux disease), hemorrhoids, Hypercholesterolemia, and Hypertension. PSurgHx:  has a past surgical history that includes pr cardiac surg procedure unlist; hx shoulder arthroscopy (Right, 06/09/2021); hx orthopaedic (Left); hx colonoscopy; and hx orthopaedic (Left). PSocHx:  reports that he has quit smoking. His smoking use included cigarettes. He has a 0.75 pack-year smoking history. He has never used smokeless tobacco. He reports current alcohol use of about 6.0 standard drinks per week. He reports current drug use. Drug: Marijuana. He has ED on daily dose tadalafil with TRT. Despite good levels of serum testosterone, he did not note improvement in functionality. He has been advised on VINITA, IPP and penile injection therapy. He has been considering options. He has been non-compliant with injection therapy and has been on and off of therapy (testosterone injections) since 2021. His last injection was on 8/30/22. He is off therapy. Fluctuating testosterone. He reports fatigue. He is considering resuming therapy again. BPH with mild LUTS, improved on daily dose tadalafil. He is due for PSA screening. Historically, values are low.    >1000 mg/dL glucose in the urine today. He is diabetic. On Jardiance. He is not checking his sugars. He has a lot of stress at work. Chronic Conditions Addressed Today       1.  Type 2 diabetes mellitus with diabetic polyneuropathy, without long-term current use of insulin ([facility])     Overview      Hemoglobin A1c on 7/13/21 was 11.7 Current Assessment & Plan      Poor diabetic control may be causing a constellation of symptoms. Check HbA1c. Diabetic education given. Referred for additional education          Relevant Orders     AMB POC URINALYSIS DIP STICK AUTO W/O MICRO (Completed)     HEMOGLOBIN A1C W/O EAG     METABOLIC PANEL, COMPREHENSIVE     REFERRAL TO DIABETIC EDUCATION    2. Other male erectile dysfunction     Overview      11/11/2020: Starting summer 2020. He has less confidence in his erections. He can achieve 30-40% tumescence barely enough for penetration. He cannot maintain it. His interest is normal.  It is affecting his relationships. He has bothersome ED. Contributing factors are diabetes and HTN. He was started on tadalafil 5mg daily. Erections went from 2/10 to 4-5/10.     1/14/21: He was started on TRT, testosterone cypionate 200 mg IM q 2 weeks. 3/23/21: Daily tadalafil 5 mg dosing. He has resumed TRT as well. He was not consistently injecting. 10/6/21: tadalafil with less effect off of TRT.    7/29/22: He is not likely to have a result from PDE5i unless his testosterone levels are addressed first.  He has an Rx for tadalafil. We will restart testosterone next week. 9/16/22: he did not note effect from TRT, despite good levels. He was advised on VINITA, penile injections and IPP. Current Assessment & Plan       Chronic related to DM and low T. Address those issues. Relevant Orders     AMB POC URINALYSIS DIP STICK AUTO W/O MICRO (Completed)    3. Benign prostatic hyperplasia     Overview      Moderate bothersome sx; improved some on daily dosed tadalafil. Current Assessment & Plan       He notes decrease in flow. I suspect poorly controlled diabetes is a risk factor. Continue tamsulosin. Work on diabetes. Relevant Orders     AMB POC URINALYSIS DIP STICK AUTO W/O MICRO (Completed)    4.  Low testosterone in male     Overview      12/9/2020: serum testosterone was 210, though free testosterone was 8.9.  1/13/21: adequate free testosterone levels indicate that TRT may not help. However, he wanted to try. Started on 200 mg IM testosterone cypionate q 2 weeks on 1/14/21. He was not consistently injecting. He wished to restart therapy on 3/23/21.    6/10/21: therapeutic labs in May.      7/13/21: hepatic panel WNL, PSA 0.3, serum T was 106 with free T at 6.9    10/6/21: He has been off of therapy since June when he ran out. Resume dosing 200 mg q 2 weeks, IM.    7/29/22: He has been off of therapy since 12/2021. He wishes to resume his treatment. He was functioning better sexually on replacement therapy. Baseline labs now. 8/5/22: he resumed injections. 9/16/22: he stopped injections; he does not note improvement with erectile function. Current Assessment & Plan       Variable levels in the past.  Poor diabetic control may be the issue. He has not responded to testosterone placement in the past.  I recommend holding on revisiting the issue until his other health issues are addressed. Relevant Orders     TESTOSTERONE, FREE & TOTAL     METABOLIC PANEL, COMPREHENSIVE    5. Prostate cancer screening     Overview      PSA 12/9/2020 was 0.3  PSA 7/13/21 was 0.3  PSA 10/5/21: 0.3          Current Assessment & Plan      PSA ordered. Historically low. Relevant Orders     PSA, DIAGNOSTIC (PROSTATE SPECIFIC AG)    6. Urinary frequency     Overview      6/16/21: presents to the ED with cc of frequency in urination over the last couple of days. Patient reports he has been getting up to urinate 8-10 times a night. Associated symptoms include polydipsia. He is also complaining of constipation. Urine culture with no growth. Current Assessment & Plan      Glucosuria is exacerbating the issue                 ROS  Patient denies the symptoms of COVID-19 per routine screening guidelines.     Physical Exam  Constitutional:       General: He is not in acute distress. Pulmonary:      Effort: No respiratory distress. Abdominal:      Palpations: There is no mass. Hernia: No hernia is present. Genitourinary:     Penis: Normal. No phimosis, hypospadias, tenderness or swelling. Testes: Normal.         Right: Mass, tenderness or swelling not present. Right testis is descended. Left: Mass, tenderness or swelling not present. Left testis is descended. Epididymis:      Right: Not inflamed or enlarged. No tenderness. Left: Not inflamed or enlarged. No tenderness. Prostate: Not enlarged, not tender and no nodules present. Neurological:      Mental Status: He is alert. ASSESSMENT and PLAN  Diagnoses and all orders for this visit:    1. Benign prostatic hyperplasia, unspecified whether lower urinary tract symptoms present  Assessment & Plan:   He notes decrease in flow. I suspect poorly controlled diabetes is a risk factor. Continue tamsulosin. Work on diabetes. Orders:  -     AMB POC URINALYSIS DIP STICK AUTO W/O MICRO    2. Low testosterone in male  Assessment & Plan:   Variable levels in the past.  Poor diabetic control may be the issue. He has not responded to testosterone placement in the past.  I recommend holding on revisiting the issue until his other health issues are addressed. Orders:  -     TESTOSTERONE, FREE & TOTAL  -     METABOLIC PANEL, COMPREHENSIVE; Future    3. Prostate cancer screening  Assessment & Plan:  PSA ordered. Historically low. Orders:  -     PSA, DIAGNOSTIC (PROSTATE SPECIFIC AG); Future    4. Other male erectile dysfunction  Assessment & Plan:   Chronic related to DM and low T. Address those issues. Orders:  -     AMB POC URINALYSIS DIP STICK AUTO W/O MICRO    5. Urinary frequency  Assessment & Plan:  Glucosuria is exacerbating the issue      6.  Type 2 diabetes mellitus with diabetic polyneuropathy, without long-term current use of insulin ([facility])  Assessment & Plan:  Poor diabetic control may be causing a constellation of symptoms. Check HbA1c. Diabetic education given. Referred for additional education    Orders:  -     AMB POC URINALYSIS DIP STICK AUTO W/O MICRO  -     HEMOGLOBIN A1C W/O EAG  -     METABOLIC PANEL, COMPREHENSIVE; Future  -     REFERRAL TO DIABETIC EDUCATION         Follow-up and Dispositions    Return in about 3 months (around 3/8/2023). Jenn Rivas may have a reminder for a \"due or due soon\" health maintenance. The patient has been encouraged to contact their primary care provider for follow-up on this health maintenance or other necessary and/or routine health screening.      Theodore Pastrana MD

## 2022-12-07 NOTE — PROGRESS NOTES
[facility]    Subjective:         Patient is a 58 y.o. male who is being seen as a returning pt for a diabetic pedal exam.  Patient states he has close follow-up with his primary care provider Rachelle Romero MD). He states his last office visit with his primary care provider was 10/25/2022. States diabetes is under control but cannot recall his last hemoglobin A1c. He denies any pedal pain due to his diabetic peripheral neuropathy. He denies any recent trauma. He denies any breaks skin or systemic signs of infection. He does state his toenails are thick/discolored. He denies any other pedal complaints      Past Medical History:   Diagnosis Date    Arrhythmia     Hx of AFIB    Atrial fibrillation (HCC)     CAD (coronary artery disease)     Diabetes (H[facility])     Diverticulosis     Dysphagia     GERD (gastroesophageal reflux disease)     Hx of hemorrhoids     Hypercholesterolemia     Hypertension      Past Surgical History:   Procedure Laterality Date    HX COLONOSCOPY      HX ORTHOPAEDIC Left     wrist     HX ORTHOPAEDIC Left     big toe amputated    HX SHOULDER ARTHROSCOPY Right 06/09/2021    [place] CARDIAC SURG PROCEDURE UNLIST      Atrial fibrilation        Family History   Problem Relation Age of Onset    Cancer Father       Social History     Tobacco Use    Smoking status: Former     Packs/day: 0.25     Years: 3.00     Pack years: 0.75     Types: Cigarettes    Smokeless tobacco: Never   Substance Use Topics    Alcohol use: Yes     Alcohol/week: 6.0 standard drinks     Types: 6 Cans of beer per week     No Known Allergies  Prior to Admission medications    Medication Sig Start Date End Date Taking? Authorizing Provider   acetaminophen (TYLENOL) 325 mg tablet Take 2 Tablets by mouth four (4) times daily as needed for Pain. 10/14/22   Behzad Sanchez MD   methocarbamoL (Robaxin-750) 750 mg tablet Take 1 Tablet by mouth three (3) times daily as needed for Muscle Spasm(s).  10/14/22   Lulu MD Gilmer   testosterone cypionate (DEPOTESTOTERONE CYPIONATE) 200 mg/mL injection 1 mL by IntraMUSCular route Once every 2 weeks. Max Daily Amount: 200 mg. 8/16/22   Saurabh Flores NP   HYDROcodone-acetaminophen [facility]) 5-325 mg per tablet Take 1 Tablet by mouth every six (6) hours as needed. 8/4/21   Provider, Historical   tamsulosin (FLOMAX) 0.4 mg capsule Take 1 Capsule by mouth in the morning. 7/29/22   Jb Chauhan MD   tadalafiL (CIALIS) 20 mg tablet Take 1 Tablet by mouth as needed for Erectile Dysfunction. 7/29/22   Jb Chauhan MD   ibuprofen (MOTRIN) 600 mg tablet Take 1 Tablet by mouth every six (6) hours as needed for Pain. 5/31/22   Lakia Nava DPM   cyclobenzaprine (FLEXERIL) 10 mg tablet Take 1 Tablet by mouth three (3) times daily as needed for Muscle Spasm(s). 5/31/22   Lakia Nava DPM   cadexomer iodine (Iodosorb) 0.9 % gel Use with daily wound care 5/31/22   Lakia Nava DPM   insulin glargine (LANTUS) 100 unit/mL injection 15 unit bid 5/19/22   Binh Bush MD   lisinopriL (PRINIVIL, ZESTRIL) 5 mg tablet Take 5 mg by mouth daily. 3/11/21   Provider, Historical   omeprazole (PRILOSEC) 40 mg capsule Take 40 mg by mouth daily. Provider, Historical   atorvastatin (LIPITOR) 20 mg tablet Take 20 mg by mouth daily. Provider, Historical   empagliflozin (JARDIANCE) 10 mg tablet Take 10 mg by mouth daily. Provider, Historical   metoprolol tartrate (LOPRESSOR) 100 mg IR tablet Take 100 mg by mouth two (2) times a day. Provider, Historical   aspirin delayed-release 81 mg tablet Take 81 mg by mouth daily. Provider, Historical       Review of Systems   Constitutional: Negative. HENT: Negative. Eyes: Negative. Respiratory: Negative. Cardiovascular: Negative. Gastrointestinal: Negative. Endocrine: Negative. Genitourinary: Negative. Musculoskeletal: Negative. Allergic/Immunologic: Positive for immunocompromised state. Neurological:  Positive for numbness. Hematological: Negative. Psychiatric/Behavioral: Negative. All other systems reviewed and are negative. Objective:     Visit Vitals  /78 (BP 1 Location: Right upper arm, BP Patient Position: Sitting, BP Cuff Size: Large adult)   Pulse (!) 101   Temp 96.9 °F (36.1 °C) (Temporal)   Ht 6' 2\" (1.88 m)   Wt 242 lb (109.8 kg)   BMI 31.07 kg/m²         Physical Exam  Vitals reviewed. Constitutional:       Appearance: He is obese. Cardiovascular:      Pulses:           Dorsalis pedis pulses are 2+ on the right side and 2+ on the left side. Posterior tibial pulses are 2+ on the right side and 2+ on the left side. Pulmonary:      Effort: Pulmonary effort is normal.   Musculoskeletal:      Right lower leg: Edema present. Left lower leg: Edema present. Right foot: Normal range of motion. No deformity or bunion. Left foot: Decreased range of motion. Deformity present. No bunion. Feet:      Right foot:      Protective Sensation: 10 sites tested. 0 sites sensed. Skin integrity: Skin integrity normal.      Toenail Condition: Right toenails are normal.      Left foot:      Protective Sensation: 10 sites tested. 0 sites sensed. Skin integrity: Skin integrity normal.      Comments: Healed partial left great toe amp noted  Lymphadenopathy:      Lower Body: No right inguinal adenopathy. Left inguinal adenopathy present. Skin:     General: Skin is warm. Capillary Refill: Capillary refill takes 2 to 3 seconds. Neurological:      Mental Status: He is alert and oriented to person, place, and time. Psychiatric:         Mood and Affect: Mood and affect normal.         Behavior: Behavior is cooperative. Data Review: No results found for this or any previous visit (from the past 24 hour(s)). Impression:       ICD-10-CM ICD-9-CM    1.  Type 2 diabetes mellitus with diabetic polyneuropathy, without long-term current use of insulin ([facility])  E11.42 250.60      357.2       2. Onychodystrophy  L60.3 703.8       3. History of partial amputation of toe of left foot [facility])  [id number] V49.72               Recommendation:     Patient seen and evaluated in the office  Discussed and educated patient regarding his/her current medical condition as it pertains to his/her diabetes and his/her thick/discolored toenails. Instructed patient to monitor his/her feet daily for possible wound formation, be compliant with all medications and wear supportive shoe gear for wound prevention. Reviewed and discussed most recent lab work, specifically as it pertains to his/her diabetes. A sharp toenail plate debridement was performed to all 9 remaining pedal digits with a nail nipper without incident. Patient tolerated well no dressing was needed  Pt verbalized understanding of all discussed and reviewed          Gurpreet Montilla, [address] [facility]  [address] [place] [address] [address], [address] [place] [address]  O: [phone]  F: [phone]  C: [phone]

## 2022-12-08 ENCOUNTER — OFFICE VISIT (OUTPATIENT)
Dept: UROLOGY | Age: 62
End: 2022-12-08
Payer: MEDICAID

## 2022-12-08 VITALS — WEIGHT: 247 LBS | HEIGHT: 74 IN | BODY MASS INDEX: 31.7 KG/M2

## 2022-12-08 DIAGNOSIS — N52.8 OTHER MALE ERECTILE DYSFUNCTION: ICD-10-CM

## 2022-12-08 DIAGNOSIS — Z12.5 PROSTATE CANCER SCREENING: ICD-10-CM

## 2022-12-08 DIAGNOSIS — N40.0 BENIGN PROSTATIC HYPERPLASIA, UNSPECIFIED WHETHER LOWER URINARY TRACT SYMPTOMS PRESENT: ICD-10-CM

## 2022-12-08 DIAGNOSIS — R35.0 URINARY FREQUENCY: ICD-10-CM

## 2022-12-08 DIAGNOSIS — R79.89 LOW TESTOSTERONE IN MALE: ICD-10-CM

## 2022-12-08 DIAGNOSIS — E11.42 TYPE 2 DIABETES MELLITUS WITH DIABETIC POLYNEUROPATHY, WITHOUT LONG-TERM CURRENT USE OF INSULIN (HCC): ICD-10-CM

## 2022-12-08 LAB
BILIRUB UR QL: NEGATIVE
GLUCOSE UR-MCNC: 1000 MG/DL
KETONES P FAST UR STRIP-MCNC: NEGATIVE MG/DL
PH UR STRIP: 6 [PH] (ref 4.6–8)
PROT UR QL STRIP: NEGATIVE
SP GR UR STRIP: 1.01 (ref 1–1.03)
UA UROBILINOGEN AMB POC: NORMAL (ref 0.2–1)
URINALYSIS CLARITY POC: CLEAR
URINALYSIS COLOR POC: YELLOW
URINE BLOOD POC: NEGATIVE
URINE LEUKOCYTES POC: NEGATIVE
URINE NITRITES POC: NEGATIVE

## 2022-12-08 NOTE — LETTER
12/8/2022    Patient: Myra Narayan   YOB: 1960   Date of Visit: 12/8/2022     Antonio Angulo MD  [address]  [address] [facility] [address]  Via Fax: [phone]    Dear Antonio Angulo MD,      Thank you for referring Mr. Jaycee Fuentes to [place] [address] for evaluation. My notes for this consultation are attached. If you have questions, please do not hesitate to call me. I look forward to following your patient along with you.       Sincerely,    Myrna Gutierrez MD

## 2022-12-08 NOTE — PROGRESS NOTES
Chief Complaint   Patient presents with    Follow-up    Hypogonadism    Erectile Dysfunction    Annual Exam    Urinary Frequency     1. Have you been to the ER, urgent care clinic since your last visit? Hospitalized since your last visit? Yes Where: [address]    2. Have you seen or consulted any other health care providers outside of the [address] [place] [address] since your last visit? Include any pap smears or colon screening.  No  Visit Vitals  Ht 6' 2\" (1.88 m)   Wt 247 lb (112 kg)   BMI 31.71 kg/m²

## 2022-12-08 NOTE — ASSESSMENT & PLAN NOTE
Poor diabetic control may be causing a constellation of symptoms. Check HbA1c. Diabetic education given.   Referred for additional education

## 2022-12-08 NOTE — ASSESSMENT & PLAN NOTE
He notes decrease in flow. I suspect poorly controlled diabetes is a risk factor. Continue tamsulosin. Work on diabetes.

## 2022-12-08 NOTE — ASSESSMENT & PLAN NOTE
Variable levels in the past.  Poor diabetic control may be the issue. He has not responded to testosterone placement in the past.  I recommend holding on revisiting the issue until his other health issues are addressed.

## 2022-12-15 ENCOUNTER — TELEPHONE (OUTPATIENT)
Dept: UROLOGY | Age: 62
End: 2022-12-15

## 2022-12-15 NOTE — TELEPHONE ENCOUNTER
I called the patient. His testosterone is not an acute issue. His diabetes is out of control. HbA1c 13.7, . I recommend he report to the ER due to risk of ketoacidosis and coma. He expressed understanding and sounded like he would comply.

## 2022-12-19 DIAGNOSIS — E11.42 DIABETIC POLYNEUROPATHY ASSOCIATED WITH TYPE 2 DIABETES MELLITUS (HCC): ICD-10-CM

## 2022-12-20 RX ORDER — PREGABALIN 75 MG/1
CAPSULE ORAL
Qty: 90 CAPSULE | Refills: 2 | Status: SHIPPED | OUTPATIENT
Start: 2022-12-20

## 2023-01-30 ENCOUNTER — HOSPITAL ENCOUNTER (EMERGENCY)
Age: 63
Discharge: HOME | End: 2023-01-30
Attending: EMERGENCY MEDICINE
Payer: MEDICAID

## 2023-01-30 VITALS
BODY MASS INDEX: 31.44 KG/M2 | TEMPERATURE: 98.2 F | DIASTOLIC BLOOD PRESSURE: 78 MMHG | OXYGEN SATURATION: 100 % | WEIGHT: 245 LBS | SYSTOLIC BLOOD PRESSURE: 148 MMHG | RESPIRATION RATE: 16 BRPM | HEIGHT: 74 IN | HEART RATE: 87 BPM

## 2023-01-30 DIAGNOSIS — R19.7 DIARRHEA, UNSPECIFIED TYPE: ICD-10-CM

## 2023-01-30 DIAGNOSIS — R10.84 ABDOMINAL PAIN, GENERALIZED: Primary | ICD-10-CM

## 2023-01-30 LAB
ALBUMIN SERPL-MCNC: 3.6 G/DL (ref 3.5–5)
ALBUMIN/GLOB SERPL: 1.1 (ref 1.1–2.2)
ALP SERPL-CCNC: 88 U/L (ref 45–117)
ALT SERPL-CCNC: 37 U/L (ref 12–78)
ANION GAP SERPL CALC-SCNC: 5 MMOL/L (ref 5–15)
APPEARANCE UR: ABNORMAL
AST SERPL W P-5'-P-CCNC: 28 U/L (ref 15–37)
BACTERIA URNS QL MICRO: NEGATIVE /HPF
BACTERIA URNS QL MICRO: NEGATIVE /HPF
BASOPHILS # BLD: 0 K/UL (ref 0–0.1)
BASOPHILS NFR BLD: 0 % (ref 0–1)
BILIRUB SERPL-MCNC: 0.5 MG/DL (ref 0.2–1)
BILIRUB UR QL: ABNORMAL
BUN SERPL-MCNC: 12 MG/DL (ref 6–20)
BUN/CREAT SERPL: 15 (ref 12–20)
CA-I BLD-MCNC: 8.5 MG/DL (ref 8.5–10.1)
CHLORIDE SERPL-SCNC: 107 MMOL/L (ref 97–108)
CO2 SERPL-SCNC: 26 MMOL/L (ref 21–32)
COLOR UR: ABNORMAL
COVID-19 RAPID TEST, COVR: NOT DETECTED
CREAT SERPL-MCNC: 0.81 MG/DL (ref 0.7–1.3)
DIFFERENTIAL METHOD BLD: NORMAL
EOSINOPHIL # BLD: 0.1 K/UL (ref 0–0.4)
EOSINOPHIL NFR BLD: 3 % (ref 0–7)
EPITH CASTS URNS QL MICRO: ABNORMAL /LPF
ERYTHROCYTE [DISTWIDTH] IN BLOOD BY AUTOMATED COUNT: 12.8 % (ref 11.5–14.5)
FLUAV AG NPH QL IA: NEGATIVE
FLUBV AG NOSE QL IA: NEGATIVE
GLOBULIN SER CALC-MCNC: 3.4 G/DL (ref 2–4)
GLUCOSE SERPL-MCNC: 183 MG/DL (ref 65–100)
GLUCOSE UR STRIP.AUTO-MCNC: NEGATIVE MG/DL
HCT VFR BLD AUTO: 46.5 % (ref 36.6–50.3)
HGB BLD-MCNC: 16 G/DL (ref 12.1–17)
HGB UR QL STRIP: NEGATIVE
IMM GRANULOCYTES # BLD AUTO: 0 K/UL (ref 0–0.04)
IMM GRANULOCYTES NFR BLD AUTO: 0 % (ref 0–0.5)
KETONES UR QL STRIP.AUTO: 15 MG/DL
LEUKOCYTE ESTERASE UR QL STRIP.AUTO: ABNORMAL
LIPASE SERPL-CCNC: 107 U/L (ref 73–393)
LYMPHOCYTES # BLD: 1.3 K/UL (ref 0.8–3.5)
LYMPHOCYTES NFR BLD: 30 % (ref 12–49)
MCH RBC QN AUTO: 29.7 PG (ref 26–34)
MCHC RBC AUTO-ENTMCNC: 34.4 G/DL (ref 30–36.5)
MCV RBC AUTO: 86.3 FL (ref 80–99)
MONOCYTES # BLD: 0.4 K/UL (ref 0–1)
MONOCYTES NFR BLD: 9 % (ref 5–13)
MUCOUS THREADS URNS QL MICRO: ABNORMAL /LPF
MUCOUS THREADS URNS QL MICRO: ABNORMAL /LPF
NEUTS SEG # BLD: 2.6 K/UL (ref 1.8–8)
NEUTS SEG NFR BLD: 58 % (ref 32–75)
NITRITE UR QL STRIP.AUTO: NEGATIVE
NRBC # BLD: 0 K/UL (ref 0–0.01)
NRBC BLD-RTO: 0 PER 100 WBC
PH UR STRIP: 5
PLATELET # BLD AUTO: 217 K/UL (ref 150–400)
PMV BLD AUTO: 10.1 FL (ref 8.9–12.9)
POTASSIUM SERPL-SCNC: 3.4 MMOL/L (ref 3.5–5.1)
PROT SERPL-MCNC: 7 G/DL (ref 6.4–8.2)
PROT UR STRIP-MCNC: 30 MG/DL
RBC # BLD AUTO: 5.39 M/UL (ref 4.1–5.7)
RBC #/AREA URNS HPF: ABNORMAL /HPF (ref 0–5)
RBC #/AREA URNS HPF: ABNORMAL /HPF (ref 0–5)
SODIUM SERPL-SCNC: 138 MMOL/L (ref 136–145)
SP GR UR REFRACTOMETRY: 1.02 (ref 1–1.03)
UROBILINOGEN UR QL STRIP.AUTO: 1 EU/DL (ref 0.2–1)
WBC # BLD AUTO: 4.4 K/UL (ref 4.1–11.1)
WBC URNS QL MICRO: ABNORMAL /HPF (ref 0–4)
WBC URNS QL MICRO: ABNORMAL /HPF (ref 0–4)

## 2023-01-30 PROCEDURE — 74011250636 HC RX REV CODE- 250/636: Performed by: EMERGENCY MEDICINE

## 2023-01-30 PROCEDURE — 36415 COLL VENOUS BLD VENIPUNCTURE: CPT

## 2023-01-30 PROCEDURE — 81001 URINALYSIS AUTO W/SCOPE: CPT

## 2023-01-30 PROCEDURE — 85025 COMPLETE CBC W/AUTO DIFF WBC: CPT

## 2023-01-30 PROCEDURE — 87635 SARS-COV-2 COVID-19 AMP PRB: CPT

## 2023-01-30 PROCEDURE — 99284 EMERGENCY DEPT VISIT MOD MDM: CPT

## 2023-01-30 PROCEDURE — 83690 ASSAY OF LIPASE: CPT

## 2023-01-30 PROCEDURE — 87804 INFLUENZA ASSAY W/OPTIC: CPT

## 2023-01-30 PROCEDURE — 96374 THER/PROPH/DIAG INJ IV PUSH: CPT

## 2023-01-30 PROCEDURE — 80053 COMPREHEN METABOLIC PANEL: CPT

## 2023-01-30 RX ORDER — ONDANSETRON 4 MG/1
4 TABLET, ORALLY DISINTEGRATING ORAL
Qty: 20 TABLET | Refills: 0 | Status: SHIPPED | OUTPATIENT
Start: 2023-01-30

## 2023-01-30 RX ORDER — LOPERAMIDE HYDROCHLORIDE 2 MG/1
2 CAPSULE ORAL
Qty: 20 CAPSULE | Refills: 0 | Status: SHIPPED | OUTPATIENT
Start: 2023-01-30 | End: 2023-02-09

## 2023-01-30 RX ORDER — DICYCLOMINE HYDROCHLORIDE 20 MG/1
20 TABLET ORAL
Qty: 20 TABLET | Refills: 0 | Status: SHIPPED | OUTPATIENT
Start: 2023-01-30

## 2023-01-30 RX ORDER — ONDANSETRON 2 MG/ML
4 INJECTION INTRAMUSCULAR; INTRAVENOUS
Status: COMPLETED | OUTPATIENT
Start: 2023-01-30 | End: 2023-01-30

## 2023-01-30 RX ADMIN — ONDANSETRON 4 MG: 2 INJECTION INTRAMUSCULAR; INTRAVENOUS at 11:43

## 2023-01-30 RX ADMIN — SODIUM CHLORIDE 1000 ML: 9 INJECTION, SOLUTION INTRAVENOUS at 11:43

## 2023-01-30 NOTE — Clinical Note
[address] [facility] EMERGENCY DEPT  [facility] [place] [id number] [phone]    Work/School Note    Date: 1/30/2023    To Whom It May concern:    Iqra Gallegos was seen and treated today in the emergency room by the following provider(s):  Attending Provider: Sherine Cannon MD.      Iqra Gallegos is excused from work/school on 1/30/2023 through 2/1/2023. He is medically clear to return to work/school on 2/2/2023.          Sincerely,          Blane Torre MD

## 2023-01-30 NOTE — Clinical Note
[facility] EMERGENCY DEPT  [address] [place] [address] [place] [id number]  [phone]    Work/School Note    Date: 1/30/2023    To Whom It May concern:    Sara Addison was seen and treated today in the emergency room by the following provider(s):  Attending Provider: Eliu Pearson MD.      Sara Addison is excused from work/school on 1/30/2023 through 2/1/2023. He is medically clear to return to work/school on 2/2/2023.          Sincerely,          Moe Frazier

## 2023-01-30 NOTE — DISCHARGE INSTRUCTIONS
Thank you! Thank you for allowing me to care for you in the emergency department. It is my goal to provide you with excellent care. If you have not received excellent quality care, please ask to speak to the nurse manager. Please fill out the survey that will come to you by mail or email since we listen to your feedback! Below you will find a list of your tests from today's visit. Should you have any questions, please do not hesitate to call the emergency department. Labs  Recent Results (from the past 12 hour(s))   INFLUENZA A & B AG (RAPID TEST)    Collection Time: 01/30/23 11:00 AM   Result Value Ref Range    Influenza A Antigen Negative Negative      Influenza B Antigen Negative Negative     COVID-19 RAPID TEST    Collection Time: 01/30/23 11:00 AM   Result Value Ref Range    COVID-19 rapid test Not Detected Not Detected     CBC WITH AUTOMATED DIFF    Collection Time: 01/30/23 11:37 AM   Result Value Ref Range    WBC 4.4 4.1 - 11.1 K/uL    RBC 5.39 4. 10 - 5.70 M/uL    HGB 16.0 12.1 - 17.0 g/dL    HCT 46.5 36.6 - 50.3 %    MCV 86.3 80.0 - 99.0 FL    MCH 29.7 26.0 - 34.0 PG    MCHC 34.4 30.0 - 36.5 g/dL    RDW 12.8 11.5 - 14.5 %    PLATELET 322 859 - 999 K/uL    MPV 10.1 8.9 - 12.9 FL    NRBC 0.0 0.0  WBC    ABSOLUTE NRBC 0.00 0.00 - 0.01 K/uL    NEUTROPHILS 58 32 - 75 %    LYMPHOCYTES 30 12 - 49 %    MONOCYTES 9 5 - 13 %    EOSINOPHILS 3 0 - 7 %    BASOPHILS 0 0 - 1 %    IMMATURE GRANULOCYTES 0 0 - 0.5 %    ABS. NEUTROPHILS 2.6 1.8 - 8.0 K/UL    ABS. LYMPHOCYTES 1.3 0.8 - 3.5 K/UL    ABS. MONOCYTES 0.4 0.0 - 1.0 K/UL    ABS. EOSINOPHILS 0.1 0.0 - 0.4 K/UL    ABS. BASOPHILS 0.0 0.0 - 0.1 K/UL    ABS. IMM.  GRANS. 0.0 0.00 - 0.04 K/UL    DF AUTOMATED     METABOLIC PANEL, COMPREHENSIVE    Collection Time: 01/30/23 11:37 AM   Result Value Ref Range    Sodium 138 136 - 145 mmol/L    Potassium 3.4 (L) 3.5 - 5.1 mmol/L    Chloride 107 97 - 108 mmol/L    CO2 26 21 - 32 mmol/L    Anion gap 5 5 - 15 mmol/L Glucose 183 (H) 65 - 100 mg/dL    BUN 12 6 - 20 mg/dL    Creatinine 0.81 0.70 - 1.30 mg/dL    BUN/Creatinine ratio 15 12 - 20      eGFR >60 >60 ml/min/1.73m2    Calcium 8.5 8.5 - 10.1 mg/dL    Bilirubin, total 0.5 0.2 - 1.0 mg/dL    AST (SGOT) 28 15 - 37 U/L    ALT (SGPT) 37 12 - 78 U/L    Alk. phosphatase 88 45 - 117 U/L    Protein, total 7.0 6.4 - 8.2 g/dL    Albumin 3.6 3.5 - 5.0 g/dL    Globulin 3.4 2.0 - 4.0 g/dL    A-G Ratio 1.1 1.1 - 2.2     URINALYSIS W/ RFLX MICROSCOPIC    Collection Time: 01/30/23 11:37 AM   Result Value Ref Range    Color Yellow/Straw      Appearance Hazy (A) Clear      Specific gravity 1.020 1.003 - 1.030      pH (UA) 5.0      Protein 30 (A) Negative mg/dL    Glucose Negative Negative mg/dL    Ketone 15 (A) Negative mg/dL    Bilirubin Small (A) Negative      Blood Negative Negative      Urobilinogen 1.0 0.2 - 1.0 EU/dL    Nitrites Negative Negative      Leukocyte Esterase Small (A) Negative      WBC 0-4 0 - 4 /hpf    RBC 0-5 0 - 5 /hpf    Bacteria Negative Negative /hpf    Mucus 2+ /lpf   LIPASE    Collection Time: 01/30/23 11:37 AM   Result Value Ref Range    Lipase 107 73 - 393 U/L   URINE MICROSCOPIC    Collection Time: 01/30/23 11:37 AM   Result Value Ref Range    WBC 0-4 0 - 4 /hpf    RBC 0-5 0 - 5 /hpf    Epithelial cells Few Few /lpf    Bacteria Negative Negative /hpf    Mucus 2+ (A) Negative /lpf       Radiologic Studies  No orders to display     CT Results  (Last 48 hours)      None          CXR Results  (Last 48 hours)      None          ------------------------------------------------------------------------------------------------------------  The exam and treatment you received in the Emergency Department were for an urgent problem and are not intended as complete care.  It is important that you follow-up with a doctor, nurse practitioner, or physician assistant to:  (1) confirm your diagnosis,  (2) re-evaluation of changes in your illness and treatment, and  (3) for ongoing care. Please take your discharge instructions with you when you go to your follow-up appointment. If you have any problem arranging a follow-up appointment, contact the Emergency Department. If your symptoms become worse or you do not improve as expected and you are unable to reach your health care provider, please return to the Emergency Department. We are available 24 hours a day. If a prescription has been provided, please have it filled as soon as possible to prevent a delay in treatment. If you have any questions or reservations about taking the medication due to side effects or interactions with other medications, please call your primary care provider or contact the ER.

## 2023-01-30 NOTE — ED PROVIDER NOTES
[facility] EMERGENCY DEPT  EMERGENCY DEPARTMENT HISTORY AND PHYSICAL EXAM      Date: 1/30/2023  Patient Name: Deonte Stafford  MRN: [id number]  YOB: 1960  Date of evaluation: 1/30/2023  Provider: Ledy Abdalla MD   Note Started: 11:34 AM 1/30/23    HISTORY OF PRESENT ILLNESS     Chief Complaint   Patient presents with    Abdominal Pain    Diarrhea       History Provided By: Patient    HPI: Deonte Stafford, 58 y.o. male presents to the emergency department with 3 days history of diffuse abdominal pain and black stools with diarrhea. Patient denies fevers or chills, does report some generalized weakness. Patient denies known sick contacts, but was recently in a casino. PAST MEDICAL HISTORY   Past Medical History:  Past Medical History:   Diagnosis Date    Arrhythmia     Hx of AFIB    Atrial fibrillation (HCC)     CAD (coronary artery disease)     Diabetes ([address]     Diverticulosis     Dysphagia     GERD (gastroesophageal reflux disease)     Hx of hemorrhoids     Hypercholesterolemia     Hypertension        Past Surgical History:  Past Surgical History:   Procedure Laterality Date    HX COLONOSCOPY      HX ORTHOPAEDIC Left     wrist     HX ORTHOPAEDIC Left     big toe amputated    HX SHOULDER ARTHROSCOPY Right 06/09/2021    [place] CARDIAC SURG PROCEDURE UNLIST      Atrial fibrilation        Family History:  Family History   Problem Relation Age of Onset    Cancer Father        Social History:  Social History     Tobacco Use    Smoking status: Former     Packs/day: 0.25     Years: 3.00     Pack years: 0.75     Types: Cigarettes    Smokeless tobacco: Never   Vaping Use    Vaping Use: Never used   Substance Use Topics    Alcohol use:  Yes     Alcohol/week: 6.0 standard drinks     Types: 6 Cans of beer per week    Drug use: Yes     Types: Marijuana       Allergies:  No Known Allergies    PCP: Adilson Lee MD    Current Meds:   Previous Medications    ACETAMINOPHEN (TYLENOL) 325 MG TABLET    Take 2 Tablets by mouth four (4) times daily as needed for Pain. ASPIRIN DELAYED-RELEASE 81 MG TABLET    Take 81 mg by mouth daily. ATORVASTATIN (LIPITOR) 20 MG TABLET    Take 20 mg by mouth daily. CADEXOMER IODINE (IODOSORB) 0.9 % GEL    Use with daily wound care    CYCLOBENZAPRINE (FLEXERIL) 10 MG TABLET    Take 1 Tablet by mouth three (3) times daily as needed for Muscle Spasm(s). EMPAGLIFLOZIN (JARDIANCE) 10 MG TABLET    Take 10 mg by mouth daily. HYDROCODONE-ACETAMINOPHEN (NORCO) 5-325 MG PER TABLET    Take 1 Tablet by mouth every six (6) hours as needed. IBUPROFEN (MOTRIN) 600 MG TABLET    Take 1 Tablet by mouth every six (6) hours as needed for Pain. INSULIN GLARGINE (LANTUS) 100 UNIT/ML INJECTION    15 unit bid    LISINOPRIL (PRINIVIL, ZESTRIL) 5 MG TABLET    Take 5 mg by mouth daily. METHOCARBAMOL (ROBAXIN-750) 750 MG TABLET    Take 1 Tablet by mouth three (3) times daily as needed for Muscle Spasm(s). METOPROLOL TARTRATE (LOPRESSOR) 100 MG IR TABLET    Take 100 mg by mouth two (2) times a day. OMEPRAZOLE (PRILOSEC) 40 MG CAPSULE    Take 40 mg by mouth daily. PREGABALIN (LYRICA) 75 MG CAPSULE    TAKE ONE CAPSULE BY MOUTH THREE TIMES A DAY. MAXIMUM OF THREE CAPSULES DAILY    TADALAFIL (CIALIS) 20 MG TABLET    Take 1 Tablet by mouth as needed for Erectile Dysfunction. TAMSULOSIN (FLOMAX) 0.4 MG CAPSULE    Take 1 Capsule by mouth in the morning. TESTOSTERONE CYPIONATE (DEPOTESTOTERONE CYPIONATE) 200 MG/ML INJECTION    1 mL by IntraMUSCular route Once every 2 weeks. Max Daily Amount: 200 mg. REVIEW OF SYSTEMS   Review of Systems  Positives and Pertinent negatives as per HPI.     PHYSICAL EXAM     ED Triage Vitals [01/30/23 1003]   ED Encounter Vitals Group      BP (!) 136/91      Pulse (Heart Rate) 94      Resp Rate 20      Temp 98.2 °F (36.8 °C)      Temp src       O2 Sat (%) 99 %      Weight 245 lb      Height 6' 2\"      Physical Exam  Physical Exam  Constitutional: General: No acute distress. Appearance: Normal appearance. Not toxic-appearing. HENT:      Head: Normocephalic and atraumatic. Nose: Nose normal.      Mouth/Throat:      Mouth: Mucous membranes are moist.   Eyes:      Extraocular Movements: Extraocular movements intact. Pupils: Pupils are equal, round, and reactive to light. Cardiovascular:      Rate and Rhythm: Normal rate. Pulses: Normal pulses. Pulmonary:      Effort: Pulmonary effort is normal.      Breath sounds: No stridor, clear to auscultation bilaterally  Abdominal:      General: Abdomen is flat. There is no distension. Soft without any tenderness, guarding or rebound. Musculoskeletal:         General: Normal range of motion. Cervical back: Normal range of motion and neck supple. Skin:     General: Skin is warm and dry. Capillary Refill: Capillary refill takes less than 2 seconds. Neurological:      General: No focal deficit present. Mental Status: Alert and oriented to person, place, and time. Psychiatric:         Mood and Affect: Mood normal.         Behavior: Behavior normal.     SCREENINGS               No data recorded        LAB, EKG AND DIAGNOSTIC RESULTS   Labs:  Recent Results (from the past 12 hour(s))   INFLUENZA A & B AG (RAPID TEST)    Collection Time: 01/30/23 11:00 AM   Result Value Ref Range    Influenza A Antigen Negative Negative      Influenza B Antigen Negative Negative     COVID-19 RAPID TEST    Collection Time: 01/30/23 11:00 AM   Result Value Ref Range    COVID-19 rapid test Not Detected Not Detected     CBC WITH AUTOMATED DIFF    Collection Time: 01/30/23 11:37 AM   Result Value Ref Range    WBC 4.4 4.1 - 11.1 K/uL    RBC 5.39 4. 10 - 5.70 M/uL    HGB 16.0 12.1 - 17.0 g/dL    HCT 46.5 36.6 - 50.3 %    MCV 86.3 80.0 - 99.0 FL    MCH 29.7 26.0 - 34.0 PG    MCHC 34.4 30.0 - 36.5 g/dL    RDW 12.8 11.5 - 14.5 %    PLATELET 792 049 - 053 K/uL    MPV 10.1 8.9 - 12.9 FL    NRBC 0.0 0.0  WBC    ABSOLUTE NRBC 0.00 0.00 - 0.01 K/uL    NEUTROPHILS 58 32 - 75 %    LYMPHOCYTES 30 12 - 49 %    MONOCYTES 9 5 - 13 %    EOSINOPHILS 3 0 - 7 %    BASOPHILS 0 0 - 1 %    IMMATURE GRANULOCYTES 0 0 - 0.5 %    ABS. NEUTROPHILS 2.6 1.8 - 8.0 K/UL    ABS. LYMPHOCYTES 1.3 0.8 - 3.5 K/UL    ABS. MONOCYTES 0.4 0.0 - 1.0 K/UL    ABS. EOSINOPHILS 0.1 0.0 - 0.4 K/UL    ABS. BASOPHILS 0.0 0.0 - 0.1 K/UL    ABS. IMM. GRANS. 0.0 0.00 - 0.04 K/UL    DF AUTOMATED     METABOLIC PANEL, COMPREHENSIVE    Collection Time: 01/30/23 11:37 AM   Result Value Ref Range    Sodium 138 136 - 145 mmol/L    Potassium 3.4 (L) 3.5 - 5.1 mmol/L    Chloride 107 97 - 108 mmol/L    CO2 26 21 - 32 mmol/L    Anion gap 5 5 - 15 mmol/L    Glucose 183 (H) 65 - 100 mg/dL    BUN 12 6 - 20 mg/dL    Creatinine 0.81 0.70 - 1.30 mg/dL    BUN/Creatinine ratio 15 12 - 20      eGFR >60 >60 ml/min/1.73m2    Calcium 8.5 8.5 - 10.1 mg/dL    Bilirubin, total 0.5 0.2 - 1.0 mg/dL    AST (SGOT) 28 15 - 37 U/L    ALT (SGPT) 37 12 - 78 U/L    Alk.  phosphatase 88 45 - 117 U/L    Protein, total 7.0 6.4 - 8.2 g/dL    Albumin 3.6 3.5 - 5.0 g/dL    Globulin 3.4 2.0 - 4.0 g/dL    A-G Ratio 1.1 1.1 - 2.2     URINALYSIS W/ RFLX MICROSCOPIC    Collection Time: 01/30/23 11:37 AM   Result Value Ref Range    Color Yellow/Straw      Appearance Hazy (A) Clear      Specific gravity 1.020 1.003 - 1.030      pH (UA) 5.0      Protein 30 (A) Negative mg/dL    Glucose Negative Negative mg/dL    Ketone 15 (A) Negative mg/dL    Bilirubin Small (A) Negative      Blood Negative Negative      Urobilinogen 1.0 0.2 - 1.0 EU/dL    Nitrites Negative Negative      Leukocyte Esterase Small (A) Negative      WBC 0-4 0 - 4 /hpf    RBC 0-5 0 - 5 /hpf    Bacteria Negative Negative /hpf    Mucus 2+ /lpf   LIPASE    Collection Time: 01/30/23 11:37 AM   Result Value Ref Range    Lipase 107 73 - 393 U/L   URINE MICROSCOPIC    Collection Time: 01/30/23 11:37 AM   Result Value Ref Range    WBC 0-4 0 - 4 /hpf RBC 0-5 0 - 5 /hpf    Epithelial cells Few Few /lpf    Bacteria Negative Negative /hpf    Mucus 2+ (A) Negative /lpf       ED COURSE and DIFFERENTIAL DIAGNOSIS/MDM   Vitals:    Vitals:    01/30/23 1003   BP: (!) 136/91   Pulse: 94   Resp: 20   Temp: 98.2 °F (36.8 °C)   SpO2: 99%   Weight: 111.1 kg (245 lb)   Height: 6' 2\" (1.88 m)        Patient was given the following medications:  Medications   sodium chloride 0.9 % bolus infusion 1,000 mL (0 mL IntraVENous IV Completed 1/30/23 1324)   ondansetron (ZOFRAN) injection 4 mg (4 mg IntraVENous Given 1/30/23 1143)       CONSULTS: (Who and What was discussed)  None     Chronic Conditions: Diabetes, hypertension  Social Determinants affecting Dx or Tx: None  Counseling:      Records Reviewed (source and summary of external notes): Prior medical records and Nursing notes    CC/HPI Summary, DDx, ED Course, and Reassessment: Patient with complaint of abdominal pain and diarrhea. Afebrile and well-appearing with soft abdomen, less likely acute serious bacterial infection, appendicitis, diverticulitis or other intra-abdominal pathology. We will get screening labs and urinalysis, medicate for symptomatic improvement. Disposition Considerations (Tests not done, Shared Decision Making, Pt Expectation of Test or Treatment.):  Discussed with patient the unremarkable work-up including normal WBC count, most likely viral source of his symptoms. Discussed the need to have close follow-up, as patient states he was seen by GI with a colonoscopy approximately 8 months ago but has not yet heard the results, states they did want him to follow-up. Discussed at length the return precautions as well, noting that if he is having blood in his stool that at this time it is being compensated with his normal hemoglobin. FINAL IMPRESSION     1. Abdominal pain, generalized    2.  Diarrhea, unspecified type          DISPOSITION/PLAN   Discharged    Discharge Note: The patient is stable for discharge home. The signs, symptoms, diagnosis, and discharge instructions have been discussed, understanding conveyed, and agreed upon. The patient is to follow up as recommended or return to ER should their symptoms worsen. PATIENT REFERRED TO:  Follow-up Information       Follow up With Specialties Details Why Contact Info    Denisse Zavala MD Family Medicine In 2 days  [address]  [phone]                DISCHARGE MEDICATIONS:  Current Discharge Medication List        START taking these medications    Details   ondansetron (ZOFRAN ODT) 4 mg disintegrating tablet Take 1 Tablet by mouth every eight (8) hours as needed for Nausea or Vomiting. Qty: 20 Tablet, Refills: 0  Start date: 1/30/2023      dicyclomine (BENTYL) 20 mg tablet Take 1 Tablet by mouth every six (6) hours as needed (Abdominal pain). Qty: 20 Tablet, Refills: 0  Start date: 1/30/2023      loperamide (IMODIUM) 2 mg capsule Take 1 Capsule by mouth four (4) times daily as needed for Diarrhea for up to 10 days. Qty: 20 Capsule, Refills: 0  Start date: 1/30/2023, End date: 2/9/2023               DISCONTINUED MEDICATIONS:  Current Discharge Medication List          I am the Primary Clinician of Record: Linda Gagnon MD (electronically signed)    (Please note that parts of this dictation were completed with voice recognition software. Quite often unanticipated grammatical, syntax, homophones, and other interpretive errors are inadvertently transcribed by the computer software. Please disregards these errors.  Please excuse any errors that have escaped final proofreading.)

## 2023-04-08 PROBLEM — Z12.5 PROSTATE CANCER SCREENING: Status: RESOLVED | Noted: 2021-09-21 | Resolved: 2023-04-08

## 2023-05-16 RX ORDER — INSULIN GLARGINE 100 [IU]/ML
INJECTION, SOLUTION SUBCUTANEOUS
COMMUNITY
Start: 2022-05-19

## 2023-05-16 RX ORDER — METOPROLOL TARTRATE 100 MG/1
100 TABLET ORAL 2 TIMES DAILY
COMMUNITY

## 2023-05-16 RX ORDER — OMEPRAZOLE 40 MG/1
40 CAPSULE, DELAYED RELEASE ORAL DAILY
COMMUNITY

## 2023-05-16 RX ORDER — LISINOPRIL 5 MG/1
5 TABLET ORAL DAILY
COMMUNITY
Start: 2021-03-11

## 2023-05-16 RX ORDER — DICYCLOMINE HCL 20 MG
20 TABLET ORAL EVERY 6 HOURS PRN
COMMUNITY
Start: 2023-01-30

## 2023-05-16 RX ORDER — TAMSULOSIN HYDROCHLORIDE 0.4 MG/1
0.4 CAPSULE ORAL DAILY
COMMUNITY
Start: 2022-07-29

## 2023-05-16 RX ORDER — HYDROCODONE BITARTRATE AND ACETAMINOPHEN 5; 325 MG/1; MG/1
1 TABLET ORAL EVERY 6 HOURS PRN
COMMUNITY
Start: 2021-08-04

## 2023-05-16 RX ORDER — ACETAMINOPHEN 325 MG/1
650 TABLET ORAL 4 TIMES DAILY PRN
COMMUNITY
Start: 2022-10-14

## 2023-05-16 RX ORDER — ASPIRIN 81 MG/1
81 TABLET ORAL DAILY
COMMUNITY

## 2023-05-16 RX ORDER — CYCLOBENZAPRINE HCL 10 MG
10 TABLET ORAL 3 TIMES DAILY PRN
COMMUNITY
Start: 2022-05-31

## 2023-05-16 RX ORDER — IBUPROFEN 600 MG/1
600 TABLET ORAL EVERY 6 HOURS PRN
COMMUNITY
Start: 2022-05-31

## 2023-05-16 RX ORDER — TADALAFIL 20 MG/1
20 TABLET ORAL PRN
COMMUNITY
Start: 2022-07-29

## 2023-05-16 RX ORDER — TESTOSTERONE CYPIONATE 200 MG/ML
200 INJECTION, SOLUTION INTRAMUSCULAR
COMMUNITY
Start: 2022-08-16 | End: 2023-06-01 | Stop reason: ALTCHOICE

## 2023-05-16 RX ORDER — METHOCARBAMOL 750 MG/1
750 TABLET, FILM COATED ORAL 3 TIMES DAILY PRN
COMMUNITY
Start: 2022-10-14

## 2023-05-16 RX ORDER — ONDANSETRON 4 MG/1
4 TABLET, ORALLY DISINTEGRATING ORAL EVERY 8 HOURS PRN
COMMUNITY
Start: 2023-01-30 | End: 2023-06-01 | Stop reason: ALTCHOICE

## 2023-05-16 RX ORDER — PREGABALIN 75 MG/1
CAPSULE ORAL
COMMUNITY
Start: 2023-04-27

## 2023-05-16 RX ORDER — ATORVASTATIN CALCIUM 20 MG/1
20 TABLET, FILM COATED ORAL DAILY
COMMUNITY

## 2023-06-01 ENCOUNTER — OFFICE VISIT (OUTPATIENT)
Age: 63
End: 2023-06-01
Payer: MEDICARE

## 2023-06-01 ENCOUNTER — TELEPHONE (OUTPATIENT)
Age: 63
End: 2023-06-01

## 2023-06-01 VITALS — WEIGHT: 240 LBS | BODY MASS INDEX: 30.8 KG/M2 | HEIGHT: 74 IN

## 2023-06-01 DIAGNOSIS — Z91.199 NONCOMPLIANCE: ICD-10-CM

## 2023-06-01 DIAGNOSIS — E11.42 TYPE 2 DIABETES MELLITUS WITH DIABETIC POLYNEUROPATHY, WITHOUT LONG-TERM CURRENT USE OF INSULIN (HCC): ICD-10-CM

## 2023-06-01 DIAGNOSIS — N52.8 OTHER MALE ERECTILE DYSFUNCTION: ICD-10-CM

## 2023-06-01 DIAGNOSIS — R35.0 URINARY FREQUENCY: ICD-10-CM

## 2023-06-01 DIAGNOSIS — Z12.5 PROSTATE CANCER SCREENING: ICD-10-CM

## 2023-06-01 DIAGNOSIS — R79.89 LOW TESTOSTERONE IN MALE: ICD-10-CM

## 2023-06-01 DIAGNOSIS — N40.1 BENIGN PROSTATIC HYPERPLASIA WITH WEAK URINARY STREAM: Primary | ICD-10-CM

## 2023-06-01 DIAGNOSIS — R39.12 BENIGN PROSTATIC HYPERPLASIA WITH WEAK URINARY STREAM: Primary | ICD-10-CM

## 2023-06-01 PROBLEM — N40.0 BENIGN PROSTATIC HYPERPLASIA: Status: ACTIVE | Noted: 2020-12-09

## 2023-06-01 LAB
BILIRUBIN, URINE, POC: NEGATIVE
BLOOD URINE, POC: NEGATIVE
GLUCOSE URINE, POC: 1000
KETONES, URINE, POC: NEGATIVE
LEUKOCYTE ESTERASE, URINE, POC: NEGATIVE
NITRITE, URINE, POC: NEGATIVE
PH, URINE, POC: 7.5 (ref 4.6–8)
PROTEIN,URINE, POC: 30
SPECIFIC GRAVITY, URINE, POC: 1.01 (ref 1–1.03)
URINALYSIS CLARITY, POC: CLEAR
URINALYSIS COLOR, POC: YELLOW
UROBILINOGEN, POC: NORMAL

## 2023-06-01 PROCEDURE — 99213 OFFICE O/P EST LOW 20 MIN: CPT | Performed by: NURSE PRACTITIONER

## 2023-06-01 PROCEDURE — 81003 URINALYSIS AUTO W/O SCOPE: CPT | Performed by: NURSE PRACTITIONER

## 2023-06-01 RX ORDER — SILDENAFIL 100 MG/1
100 TABLET, FILM COATED ORAL DAILY PRN
Qty: 30 TABLET | Refills: 1 | Status: SHIPPED | OUTPATIENT
Start: 2023-06-01 | End: 2023-07-01

## 2023-06-01 RX ORDER — SILDENAFIL 100 MG/1
100 TABLET, FILM COATED ORAL DAILY PRN
Qty: 30 TABLET | Refills: 1 | Status: SHIPPED | OUTPATIENT
Start: 2023-06-01 | End: 2023-06-01

## 2023-06-01 NOTE — PROGRESS NOTES
HISTORY OF PRESENT ILLNESS  Cira Mcrae is a 58 y.o. male. Chief Complaint   Patient presents with    Follow-up    Benign Prostatic Hypertrophy    Hypogonadism    Erectile Dysfunction    Annual Exam    Urinary Frequency       Past Medical History:  PMHx (including negatives):  has a past medical history of Arrhythmia, Atrial fibrillation ([address] CAD (coronary artery disease), Diabetes ([address] Diverticulosis, Dysphagia, GERD (gastroesophageal reflux disease), Hx of hemorrhoids, Hypercholesterolemia, and Hypertension. PSurgHx:  has a past surgical history that includes orthopedic surgery (Left); Colonoscopy; orthopedic surgery (Left); pr unlisted procedure cardiac surgery; and Shoulder arthroscopy (Right, 06/09/2021). PSocHx:  reports that he has quit smoking. His smoking use included cigarettes. He has a 11.25 pack-year smoking history. He has never used smokeless tobacco. He reports current alcohol use of about 14.0 standard drinks per week. He reports that he does not currently use drugs after having used the following drugs: Marijuana Shaguftaanialdo Yang). Routine 6 month visit secondary to BPH, ED, hypogonadism. BPH- no trouble voiding today. Main complaint is urinary frequency but this is related to poorly controlled diabetes vs overactive bladder. He also notes he drinks a lot of fluids and does not routinely drink caffeine. ED- fully functional at times, and non-functional other times. Also contributory are HTN, poorly controlled diabetes. Low Testosterone- no real effect noted off of therapy. He would like to recheck levels at this time. Benign Prostatic Hypertrophy  This is a chronic problem. The current episode started more than 1 year ago. The problem has been gradually improving (improved flow on flomax) since onset. He is sexually active. The symptoms are aggravated by caffeine and alcohol. Past treatments include tamsulosin. The treatment provided moderate relief.  He has been using

## 2023-06-01 NOTE — PATIENT INSTRUCTIONS
Sildenafil:    Take sildenafil as needed before sexual activity. The best time to take sildenafil is about 1 hour before sexual activity, but you can take the medication any time from 4 hours to 30 minutes before sexual activity. Sildenafil should not be taken more than once every 24 hours. You should take sildenafil on an empty stomach. Potential side effects discussed included: headache, flushing, shortness of breath, chest pain, blurred vision, dizziness or lightheadedness, and erections that last longer than 4 hours or are painful. The patient expressed understanding of the information provided to him today.      Low sugar ice creams:    Enlightened  Halo Top

## 2023-06-01 NOTE — ASSESSMENT & PLAN NOTE
Historically low PSA, he is due now and will be due for routine examination in December with Dr. Nancy Ni.

## 2023-06-01 NOTE — ASSESSMENT & PLAN NOTE
He thinks he is seeing a Diabetic Educator. He has a hectic schedule and a hard time being compliant with a dietary regimen. We did discuss some strategies today. He will try some of the low carb/low sugar ice creams like HaloTop or Enlightened. I will refer him to an Endocrinologist to see if he can get better control.   Diabetes plays a huge part in his sexual function, urinary patterns, etc.

## 2023-06-01 NOTE — ASSESSMENT & PLAN NOTE
He has a hard time with compliance when it comes to medications, diet, activity, etc.  He feels he takes too many pills and should be able to eat what he wants in moderation. We discussed the importance of good diabetic control to overall health. Perhaps seeing an Endocrinologist can help. Referral provided. Satisfactory

## 2023-06-01 NOTE — ASSESSMENT & PLAN NOTE
Low testosterone in the past with little to no improvement on replacement therapy. He probably needs better control over his diabetes and hypertension (in regards to erectile function). We can check levels today. Once hemoglobin A1c is improved he may benefit more. Endocrine can also give a second opinion.

## 2023-06-01 NOTE — TELEPHONE ENCOUNTER
Referral placed to Endocrinology. Can you please make the patient an appointment please and let him know when? He may or may not have a Diabetic Educator already. He is not sure.

## 2023-06-01 NOTE — ASSESSMENT & PLAN NOTE
He is fully functional at times and not functional at other times. He was using 20 mg tadalafil. We discussed the potential benefit of going to 5 mg daily dosing but he prefers to try sildenafil. He can start with 50 mg or a half tablet to ensure he can tolerate the medication. He is given a paper prescription and advised on using [facility] pricing. We did a quick online search at [employer] and determined that [facility] may be the cheapest option for him locally. There are no contraindications to sildenafil that were reviewed today in clinic. He does see Cardiology and is on antihypertensives, no nitrates. The medication, proper usage and side effects were discussed. He is encouraged to read all package inserts carefully and call this office with any questions. He is advised that timing is important and that he should take sildenafil on an empty stomach. The patient expressed understanding of the information provided to him today. We also discussed the psychological component to erectile function. Performance anxiety makes function worse. Hormones like adrenaline and cortisol are produced in the brain whenever there is any kind of stress or anxiety. This causes the body to stiffen up and the heart to beat faster. This may make getting an erection more difficult by decreasing blood flow to the penile arteries. Behavioral modifications may help (relaxation, establishing a trusting relationship with his partner to ensure good communication, meditation).

## 2023-06-02 LAB
ALBUMIN SERPL-MCNC: 5.1 G/DL (ref 3.8–4.8)
ALBUMIN/GLOB SERPL: 1.9 {RATIO} (ref 1.2–2.2)
ALP SERPL-CCNC: 77 IU/L (ref 44–121)
ALT SERPL-CCNC: 30 IU/L (ref 0–44)
AST SERPL-CCNC: 22 IU/L (ref 0–40)
BILIRUB SERPL-MCNC: 0.4 MG/DL (ref 0–1.2)
BUN SERPL-MCNC: 14 MG/DL (ref 8–27)
BUN/CREAT SERPL: 19 (ref 10–24)
CALCIUM SERPL-MCNC: 10.3 MG/DL (ref 8.6–10.2)
CHLORIDE SERPL-SCNC: 99 MMOL/L (ref 96–106)
CO2 SERPL-SCNC: 19 MMOL/L (ref 20–29)
CREAT SERPL-MCNC: 0.75 MG/DL (ref 0.76–1.27)
EGFRCR SERPLBLD CKD-EPI 2021: 102 ML/MIN/1.73
ERYTHROCYTE [DISTWIDTH] IN BLOOD BY AUTOMATED COUNT: 13.2 % (ref 11.6–15.4)
GLOBULIN SER CALC-MCNC: 2.7 G/DL (ref 1.5–4.5)
GLUCOSE SERPL-MCNC: 315 MG/DL (ref 70–99)
HCT VFR BLD AUTO: 48.2 % (ref 37.5–51)
HGB BLD-MCNC: 16.7 G/DL (ref 13–17.7)
MCH RBC QN AUTO: 29.7 PG (ref 26.6–33)
MCHC RBC AUTO-ENTMCNC: 34.6 G/DL (ref 31.5–35.7)
MCV RBC AUTO: 86 FL (ref 79–97)
PLATELET # BLD AUTO: 216 X10E3/UL (ref 150–450)
POTASSIUM SERPL-SCNC: 4.6 MMOL/L (ref 3.5–5.2)
PROT SERPL-MCNC: 7.8 G/DL (ref 6–8.5)
PSA SERPL-MCNC: 0.3 NG/ML (ref 0–4)
RBC # BLD AUTO: 5.63 X10E6/UL (ref 4.14–5.8)
SODIUM SERPL-SCNC: 139 MMOL/L (ref 134–144)
WBC # BLD AUTO: 5.5 X10E3/UL (ref 3.4–10.8)

## 2023-06-03 LAB
TESTOST FREE SERPL-MCNC: 5.2 PG/ML (ref 6.6–18.1)
TESTOST SERPL-MCNC: 122 NG/DL (ref 264–916)

## 2023-06-26 ENCOUNTER — OFFICE VISIT (OUTPATIENT)
Age: 63
End: 2023-06-26
Payer: MEDICARE

## 2023-06-26 VITALS
BODY MASS INDEX: 30.8 KG/M2 | HEIGHT: 74 IN | SYSTOLIC BLOOD PRESSURE: 169 MMHG | DIASTOLIC BLOOD PRESSURE: 109 MMHG | HEART RATE: 88 BPM | RESPIRATION RATE: 16 BRPM | WEIGHT: 240 LBS

## 2023-06-26 DIAGNOSIS — Z89.422 HISTORY OF PARTIAL AMPUTATION OF TOE OF LEFT FOOT (HCC): ICD-10-CM

## 2023-06-26 DIAGNOSIS — E11.42 TYPE 2 DIABETES MELLITUS WITH DIABETIC POLYNEUROPATHY, WITHOUT LONG-TERM CURRENT USE OF INSULIN (HCC): Primary | ICD-10-CM

## 2023-06-26 DIAGNOSIS — L60.3 ONYCHODYSTROPHY: ICD-10-CM

## 2023-06-26 PROCEDURE — 3074F SYST BP LT 130 MM HG: CPT | Performed by: PODIATRIST

## 2023-06-26 PROCEDURE — 99213 OFFICE O/P EST LOW 20 MIN: CPT | Performed by: PODIATRIST

## 2023-06-26 PROCEDURE — 3078F DIAST BP <80 MM HG: CPT | Performed by: PODIATRIST

## 2023-06-27 ENCOUNTER — NURSE ONLY (OUTPATIENT)
Age: 63
End: 2023-06-27
Payer: MEDICARE

## 2023-06-27 DIAGNOSIS — E11.42 TYPE 2 DIABETES MELLITUS WITH DIABETIC POLYNEUROPATHY, WITHOUT LONG-TERM CURRENT USE OF INSULIN (HCC): Primary | ICD-10-CM

## 2023-06-27 PROCEDURE — G0108 DIAB MANAGE TRN  PER INDIV: HCPCS

## 2023-07-01 PROBLEM — Z12.5 PROSTATE CANCER SCREENING: Status: RESOLVED | Noted: 2021-09-21 | Resolved: 2023-07-01

## 2023-07-24 NOTE — PROGRESS NOTES
[address] [facility] PODIATRY & FOOT SURGERY      Subjective:         Patient is a 58 y.o. male who is being seen as a returning pt for a diabetic pedal exam.  Patient states he has close follow-up with his primary care provider Asher Moss MD). He states his last office visit with his primary care provider was 04/25/2023. States diabetes is under control but cannot recall his last hemoglobin A1c. He denies any pedal pain due to his diabetic peripheral neuropathy. He denies any recent trauma. He denies any breaks skin or systemic signs of infection. He does state his toenails are thick/discolored. He denies any other pedal complaints      Past Medical History:   Diagnosis Date    Arrhythmia     Hx of AFIB    Atrial fibrillation (HCC)     CAD (coronary artery disease)     Diabetes (HCC)     Diverticulosis     Dysphagia     GERD (gastroesophageal reflux disease)     Hx of hemorrhoids     Hypercholesterolemia     Hypertension      Past Surgical History:   Procedure Laterality Date    COLONOSCOPY      ORTHOPEDIC SURGERY Left     big toe amputated    ORTHOPEDIC SURGERY Left     wrist     [place] UNLISTED PROCEDURE CARDIAC SURGERY      Atrial fibrilation     SHOULDER ARTHROSCOPY Right 06/09/2021       Family History   Problem Relation Age of Onset    Cancer Father       Social History     Tobacco Use    Smoking status: Former     Packs/day: 0.25     Years: 45.00     Pack years: 11.25     Types: Cigarettes    Smokeless tobacco: Never   Substance Use Topics    Alcohol use: Yes     Alcohol/week: 14.0 standard drinks     Types: 14 Cans of beer per week     No Known Allergies  Prior to Admission medications    Medication Sig Start Date End Date Taking?  Authorizing Provider   sildenafil (VIAGRA) 100 MG tablet Take 1 tablet by mouth daily as needed for Erectile Dysfunction 6/1/23 7/1/23  DENITA Vazquez NP   acetaminophen (TYLENOL) 325 MG tablet Take 2 tablets by mouth 4 times daily as needed

## 2023-08-24 DIAGNOSIS — E11.42 TYPE 2 DIABETES MELLITUS WITH DIABETIC POLYNEUROPATHY, WITHOUT LONG-TERM CURRENT USE OF INSULIN (HCC): Primary | ICD-10-CM

## 2023-12-07 ENCOUNTER — HOSPITAL ENCOUNTER (EMERGENCY)
Facility: HOSPITAL | Age: 63
Discharge: HOME | End: 2023-12-07
Attending: STUDENT IN AN ORGANIZED HEALTH CARE EDUCATION/TRAINING PROGRAM
Payer: MEDICAID

## 2023-12-07 VITALS
TEMPERATURE: 97.7 F | HEIGHT: 74 IN | WEIGHT: 243 LBS | OXYGEN SATURATION: 96 % | SYSTOLIC BLOOD PRESSURE: 120 MMHG | RESPIRATION RATE: 16 BRPM | DIASTOLIC BLOOD PRESSURE: 82 MMHG | BODY MASS INDEX: 31.18 KG/M2 | HEART RATE: 88 BPM

## 2023-12-07 DIAGNOSIS — R73.9 HYPERGLYCEMIA: ICD-10-CM

## 2023-12-07 DIAGNOSIS — R51.9 NONINTRACTABLE HEADACHE, UNSPECIFIED CHRONICITY PATTERN, UNSPECIFIED HEADACHE TYPE: Primary | ICD-10-CM

## 2023-12-07 LAB
EKG ATRIAL RATE: 96 BPM
EKG DIAGNOSIS: NORMAL
EKG P AXIS: 44 DEGREES
EKG P-R INTERVAL: 156 MS
EKG Q-T INTERVAL: 358 MS
EKG QRS DURATION: 80 MS
EKG QTC CALCULATION (BAZETT): 452 MS
EKG R AXIS: 11 DEGREES
EKG T AXIS: 62 DEGREES
EKG VENTRICULAR RATE: 96 BPM
GLUCOSE BLD STRIP.AUTO-MCNC: 255 MG/DL (ref 65–100)
GLUCOSE BLD STRIP.AUTO-MCNC: 315 MG/DL (ref 65–100)
PERFORMED BY:: ABNORMAL
PERFORMED BY:: ABNORMAL

## 2023-12-07 PROCEDURE — 6370000000 HC RX 637 (ALT 250 FOR IP): Performed by: STUDENT IN AN ORGANIZED HEALTH CARE EDUCATION/TRAINING PROGRAM

## 2023-12-07 PROCEDURE — 93005 ELECTROCARDIOGRAM TRACING: CPT | Performed by: STUDENT IN AN ORGANIZED HEALTH CARE EDUCATION/TRAINING PROGRAM

## 2023-12-07 PROCEDURE — 82962 GLUCOSE BLOOD TEST: CPT

## 2023-12-07 PROCEDURE — 99283 EMERGENCY DEPT VISIT LOW MDM: CPT

## 2023-12-07 RX ORDER — INSULIN ASPART 100 [IU]/ML
INJECTION, SOLUTION INTRAVENOUS; SUBCUTANEOUS
COMMUNITY
Start: 2022-11-07

## 2023-12-07 RX ORDER — IBUPROFEN 600 MG/1
600 TABLET ORAL
Status: COMPLETED | OUTPATIENT
Start: 2023-12-07 | End: 2023-12-07

## 2023-12-07 RX ORDER — INSULIN GLARGINE-YFGN 100 [IU]/ML
INJECTION, SOLUTION SUBCUTANEOUS
COMMUNITY
Start: 2023-11-06

## 2023-12-07 RX ADMIN — IBUPROFEN 600 MG: 600 TABLET, FILM COATED ORAL at 07:48

## 2023-12-07 ASSESSMENT — PAIN SCALES - GENERAL: PAINLEVEL_OUTOF10: 7

## 2023-12-07 NOTE — ED PROVIDER NOTES
Tobacco Use: Medium Risk (12/7/2023)    Patient History     Smoking Tobacco Use: Former     Smokeless Tobacco Use: Never     Passive Exposure: Not on file   Alcohol Use: Not on file   Financial Resource Strain: Not on file   Food Insecurity: Not on file   Transportation Needs: Not on file   Physical Activity: Not on file   Stress: Not on file   Social Connections: Not on file   Intimate Partner Violence: Not on file   Depression: Not at risk (1/30/2023)    PHQ-2     PHQ-2 Score: 0   Housing Stability: Not on file   Interpersonal Safety: Not on file   Utilities: Not on file       PHYSICAL EXAM   Physical Exam  Vitals and nursing note reviewed. Constitutional:       General: He is not in acute distress. Appearance: Normal appearance. HENT:      Head: Normocephalic and atraumatic. Nose: Nose normal.      Mouth/Throat:      Mouth: Mucous membranes are moist.      Pharynx: Oropharynx is clear. Eyes:      Extraocular Movements: Extraocular movements intact. Conjunctiva/sclera: Conjunctivae normal.      Pupils: Pupils are equal, round, and reactive to light. Cardiovascular:      Rate and Rhythm: Normal rate and regular rhythm. Heart sounds: Normal heart sounds. Pulmonary:      Effort: Pulmonary effort is normal.      Breath sounds: Normal breath sounds. Abdominal:      General: Abdomen is flat. Bowel sounds are normal. There is no distension. Palpations: Abdomen is soft. Tenderness: There is no abdominal tenderness. Musculoskeletal:         General: No swelling, tenderness or signs of injury. Normal range of motion. Cervical back: Normal range of motion and neck supple. Skin:     General: Skin is warm and dry. Capillary Refill: Capillary refill takes less than 2 seconds. Neurological:      General: No focal deficit present. Mental Status: He is alert and oriented to person, place, and time. Mental status is at baseline.       Cranial Nerves: No cranial nerve

## 2023-12-07 NOTE — ED TRIAGE NOTES
Pt states he woke up with cramps, headache, and blood sugar was 513 when he checked it. Gave himself 46 units of his short acting insulin and 8 units of his long acting (possibly other way around he is unsure).  Alert oriented and ambulatory on arrival

## 2023-12-10 ENCOUNTER — APPOINTMENT (OUTPATIENT)
Facility: HOSPITAL | Age: 63
End: 2023-12-10
Payer: MEDICAID

## 2023-12-10 ENCOUNTER — HOSPITAL ENCOUNTER (EMERGENCY)
Facility: HOSPITAL | Age: 63
Discharge: HOME | End: 2023-12-10
Attending: STUDENT IN AN ORGANIZED HEALTH CARE EDUCATION/TRAINING PROGRAM
Payer: MEDICAID

## 2023-12-10 VITALS
BODY MASS INDEX: 31.18 KG/M2 | WEIGHT: 243 LBS | RESPIRATION RATE: 19 BRPM | OXYGEN SATURATION: 94 % | HEIGHT: 74 IN | TEMPERATURE: 97.9 F | HEART RATE: 93 BPM | SYSTOLIC BLOOD PRESSURE: 154 MMHG | DIASTOLIC BLOOD PRESSURE: 91 MMHG

## 2023-12-10 DIAGNOSIS — R07.89 OTHER CHEST PAIN: Primary | ICD-10-CM

## 2023-12-10 LAB
ALBUMIN SERPL-MCNC: 3.8 G/DL (ref 3.5–5)
ALBUMIN/GLOB SERPL: 1.2 (ref 1.1–2.2)
ALP SERPL-CCNC: 78 U/L (ref 45–117)
ALT SERPL-CCNC: 30 U/L (ref 12–78)
ANION GAP SERPL CALC-SCNC: 6 MMOL/L (ref 5–15)
AST SERPL W P-5'-P-CCNC: 16 U/L (ref 15–37)
BASOPHILS # BLD: 0 K/UL (ref 0–0.1)
BASOPHILS NFR BLD: 1 % (ref 0–1)
BILIRUB SERPL-MCNC: 0.4 MG/DL (ref 0.2–1)
BUN SERPL-MCNC: 10 MG/DL (ref 6–20)
BUN/CREAT SERPL: 11 (ref 12–20)
CA-I BLD-MCNC: 10 MG/DL (ref 8.5–10.1)
CHLORIDE SERPL-SCNC: 105 MMOL/L (ref 97–108)
CO2 SERPL-SCNC: 26 MMOL/L (ref 21–32)
CREAT SERPL-MCNC: 0.87 MG/DL (ref 0.7–1.3)
D DIMER PPP FEU-MCNC: 0.3 UG/ML(FEU)
DIFFERENTIAL METHOD BLD: NORMAL
EOSINOPHIL # BLD: 0.2 K/UL (ref 0–0.4)
EOSINOPHIL NFR BLD: 3 % (ref 0–7)
ERYTHROCYTE [DISTWIDTH] IN BLOOD BY AUTOMATED COUNT: 12.3 % (ref 11.5–14.5)
GLOBULIN SER CALC-MCNC: 3.1 G/DL (ref 2–4)
GLUCOSE BLD STRIP.AUTO-MCNC: 254 MG/DL (ref 65–100)
GLUCOSE SERPL-MCNC: 253 MG/DL (ref 65–100)
HCT VFR BLD AUTO: 44.3 % (ref 36.6–50.3)
HGB BLD-MCNC: 15.1 G/DL (ref 12.1–17)
IMM GRANULOCYTES # BLD AUTO: 0 K/UL (ref 0–0.04)
IMM GRANULOCYTES NFR BLD AUTO: 0 % (ref 0–0.5)
LYMPHOCYTES # BLD: 2.9 K/UL (ref 0.8–3.5)
LYMPHOCYTES NFR BLD: 47 % (ref 12–49)
MCH RBC QN AUTO: 28.2 PG (ref 26–34)
MCHC RBC AUTO-ENTMCNC: 34.1 G/DL (ref 30–36.5)
MCV RBC AUTO: 82.8 FL (ref 80–99)
MONOCYTES # BLD: 0.4 K/UL (ref 0–1)
MONOCYTES NFR BLD: 6 % (ref 5–13)
NEUTS SEG # BLD: 2.7 K/UL (ref 1.8–8)
NEUTS SEG NFR BLD: 43 % (ref 32–75)
NRBC # BLD: 0 K/UL (ref 0–0.01)
NRBC BLD-RTO: 0 PER 100 WBC
PERFORMED BY:: ABNORMAL
PLATELET # BLD AUTO: 227 K/UL (ref 150–400)
PMV BLD AUTO: 10.9 FL (ref 8.9–12.9)
POTASSIUM SERPL-SCNC: 4 MMOL/L (ref 3.5–5.1)
PROT SERPL-MCNC: 6.9 G/DL (ref 6.4–8.2)
RBC # BLD AUTO: 5.35 M/UL (ref 4.1–5.7)
SODIUM SERPL-SCNC: 137 MMOL/L (ref 136–145)
TROPONIN I SERPL HS-MCNC: 7 NG/L (ref 0–76)
TROPONIN I SERPL HS-MCNC: 9 NG/L (ref 0–76)
WBC # BLD AUTO: 6.3 K/UL (ref 4.1–11.1)

## 2023-12-10 PROCEDURE — 93005 ELECTROCARDIOGRAM TRACING: CPT | Performed by: STUDENT IN AN ORGANIZED HEALTH CARE EDUCATION/TRAINING PROGRAM

## 2023-12-10 PROCEDURE — 36415 COLL VENOUS BLD VENIPUNCTURE: CPT

## 2023-12-10 PROCEDURE — 82962 GLUCOSE BLOOD TEST: CPT

## 2023-12-10 PROCEDURE — 85025 COMPLETE CBC W/AUTO DIFF WBC: CPT

## 2023-12-10 PROCEDURE — 80053 COMPREHEN METABOLIC PANEL: CPT

## 2023-12-10 PROCEDURE — 85379 FIBRIN DEGRADATION QUANT: CPT

## 2023-12-10 PROCEDURE — 71045 X-RAY EXAM CHEST 1 VIEW: CPT

## 2023-12-10 PROCEDURE — 99284 EMERGENCY DEPT VISIT MOD MDM: CPT

## 2023-12-10 PROCEDURE — 84484 ASSAY OF TROPONIN QUANT: CPT

## 2023-12-10 ASSESSMENT — LIFESTYLE VARIABLES
HOW OFTEN DO YOU HAVE A DRINK CONTAINING ALCOHOL: 4 OR MORE TIMES A WEEK
HOW MANY STANDARD DRINKS CONTAINING ALCOHOL DO YOU HAVE ON A TYPICAL DAY: 1 OR 2

## 2023-12-10 ASSESSMENT — PAIN SCALES - GENERAL: PAINLEVEL_OUTOF10: 8

## 2023-12-10 ASSESSMENT — HEART SCORE: ECG: 0

## 2023-12-10 NOTE — ED NOTES
Pt placed on continuous cardiac monitoring, pulse ox, and blood pressure monitoring at this time.         Sourav Posada RN  12/10/23 3901

## 2023-12-10 NOTE — ED PROVIDER NOTES
EMERGENCY DEPARTMENT HISTORY AND PHYSICAL EXAM      Date: 12/10/2023  Patient Name: Javier Lopes  MRN: [id number]  [facility] 1960  Date of evaluation: 12/10/2023  Provider: Santo Chapman MD     History of Present Illness     Chief Complaint   Patient presents with    Chest Pain       History Provided By: Patient    HPI: Javier Lopes, 61 y.o. male with past medical history as listed and reviewed below presenting to the ED for evaluation of right-sided chest pain that began this morning. Patient states he woke up has a right-sided chest pressure that is pleuritic, does not have any radiation, is worse when he walks, he does feel associated shortness of breath, no nausea or numbness or vomiting. Patient has not had a fever cough congestion. He denies any history of blood clots, no recent leg swelling. He notes the pain will occasionally get worse when he walks. He denies any drug use, cigarette use, alcohol use.   Denies any recent intra-abdominal thoracic surgery    Medical History     Past Medical History:  Past Medical History:   Diagnosis Date    Arrhythmia     Hx of AFIB    Atrial fibrillation (HCC)     CAD (coronary artery disease)     Diabetes (HCC)     Diverticulosis     Dysphagia     GERD (gastroesophageal reflux disease)     Hx of hemorrhoids     Hypercholesterolemia     Hypertension        Past Surgical History:  Past Surgical History:   Procedure Laterality Date    COLONOSCOPY      ORTHOPEDIC SURGERY Left     big toe amputated    ORTHOPEDIC SURGERY Left     wrist     [place] UNLISTED PROCEDURE CARDIAC SURGERY      Atrial fibrilation     SHOULDER ARTHROSCOPY Right 06/09/2021       Family History:  Family History   Problem Relation Age of Onset    Cancer Father        Social History:  Social History     Tobacco Use    Smoking status: Former     Packs/day: 0.25     Years: 45.00     Additional pack years: 0.00     Total pack years: 11.25     Types: Cigarettes    Smokeless tobacco: Never

## 2023-12-10 NOTE — DISCHARGE INSTRUCTIONS
Thank you! Thank you for allowing me to care for you in the emergency department. I sincerely hope that you are satisfied with your visit today. It is my goal to provide you with excellent care. Below you will find a list of your labs and imaging from your visit today if applicable. Should you have any questions regarding these results please do not hesitate to call the emergency department. Please review [employer] for a more detailed result list since the below list may not be comprehensive. Instructions on how to sign up to [employer] should be provided in this packet.     Labs -     Recent Results (from the past 12 hour(s))   CBC with Auto Differential    Collection Time: 12/10/23  8:07 AM   Result Value Ref Range    WBC 6.3 4.1 - 11.1 K/uL    RBC 5.35 4.10 - 5.70 M/uL    Hemoglobin 15.1 12.1 - 17.0 g/dL    Hematocrit 44.3 36.6 - 50.3 %    MCV 82.8 80.0 - 99.0 FL    MCH 28.2 26.0 - 34.0 PG    MCHC 34.1 30.0 - 36.5 g/dL    RDW 12.3 11.5 - 14.5 %    Platelets 962 925 - 810 K/uL    MPV 10.9 8.9 - 12.9 FL    Nucleated RBCs 0.0 0.0  WBC    nRBC 0.00 0.00 - 0.01 K/uL    Neutrophils % 43 32 - 75 %    Lymphocytes % 47 12 - 49 %    Monocytes % 6 5 - 13 %    Eosinophils % 3 0 - 7 %    Basophils % 1 0 - 1 %    Immature Granulocytes 0 0 - 0.5 %    Neutrophils Absolute 2.7 1.8 - 8.0 K/UL    Lymphocytes Absolute 2.9 0.8 - 3.5 K/UL    Monocytes Absolute 0.4 0.0 - 1.0 K/UL    Eosinophils Absolute 0.2 0.0 - 0.4 K/UL    Basophils Absolute 0.0 0.0 - 0.1 K/UL    Absolute Immature Granulocyte 0.0 0.00 - 0.04 K/UL    Differential Type AUTOMATED     Comprehensive Metabolic Panel    Collection Time: 12/10/23  8:07 AM   Result Value Ref Range    Sodium 137 136 - 145 mmol/L    Potassium 4.0 3.5 - 5.1 mmol/L    Chloride 105 97 - 108 mmol/L    CO2 26 21 - 32 mmol/L    Anion Gap 6 5 - 15 mmol/L    Glucose 253 (H) 65 - 100 mg/dL    BUN 10 6 - 20 mg/dL    Creatinine 0.87 0.70 - 1.30 mg/dL    Bun/Cre Ratio 11 (L) 12 - 20      Est,

## 2023-12-11 LAB
EKG ATRIAL RATE: 96 BPM
EKG DIAGNOSIS: NORMAL
EKG P AXIS: 82 DEGREES
EKG P-R INTERVAL: 174 MS
EKG Q-T INTERVAL: 352 MS
EKG QRS DURATION: 82 MS
EKG QTC CALCULATION (BAZETT): 444 MS
EKG R AXIS: 23 DEGREES
EKG T AXIS: 63 DEGREES
EKG VENTRICULAR RATE: 96 BPM

## 2023-12-15 RX ORDER — PREGABALIN 75 MG/1
75 CAPSULE ORAL 3 TIMES DAILY
Qty: 270 CAPSULE | Refills: 0 | Status: SHIPPED | OUTPATIENT
Start: 2023-12-15 | End: 2024-03-14

## 2023-12-15 NOTE — TELEPHONE ENCOUNTER
LOV 06-26-23 NOV 12-19-23    Pharmacy requesting refill on Pregabalin 75mg #270. LFD 04-27-23    Prescription pended for provider approval, if appropriate.

## 2023-12-24 ENCOUNTER — HOSPITAL ENCOUNTER (EMERGENCY)
Facility: HOSPITAL | Age: 63
Discharge: HOME | End: 2023-12-24
Payer: MEDICAID

## 2023-12-24 VITALS
SYSTOLIC BLOOD PRESSURE: 148 MMHG | OXYGEN SATURATION: 100 % | RESPIRATION RATE: 16 BRPM | TEMPERATURE: 98.2 F | HEART RATE: 92 BPM | BODY MASS INDEX: 31.18 KG/M2 | WEIGHT: 243 LBS | HEIGHT: 74 IN | DIASTOLIC BLOOD PRESSURE: 84 MMHG

## 2023-12-24 DIAGNOSIS — M54.32 SCIATICA OF LEFT SIDE: Primary | ICD-10-CM

## 2023-12-24 LAB
APPEARANCE UR: CLEAR
BACTERIA URNS QL MICRO: NEGATIVE /HPF
BILIRUB UR QL: NEGATIVE
COLOR UR: ABNORMAL
EPITH CASTS URNS QL MICRO: ABNORMAL /LPF
GLUCOSE BLD STRIP.AUTO-MCNC: 309 MG/DL (ref 65–100)
GLUCOSE UR STRIP.AUTO-MCNC: >500 MG/DL
HGB UR QL STRIP: NEGATIVE
KETONES UR QL STRIP.AUTO: NEGATIVE MG/DL
LEUKOCYTE ESTERASE UR QL STRIP.AUTO: NEGATIVE
NITRITE UR QL STRIP.AUTO: NEGATIVE
PERFORMED BY:: ABNORMAL
PH UR STRIP: 6 (ref 5–8)
PROT UR STRIP-MCNC: NEGATIVE MG/DL
RBC #/AREA URNS HPF: ABNORMAL /HPF (ref 0–5)
SP GR UR REFRACTOMETRY: 1.03 (ref 1–1.03)
URINE CULTURE IF INDICATED: ABNORMAL
UROBILINOGEN UR QL STRIP.AUTO: 0.1 EU/DL (ref 0.1–1)
WBC URNS QL MICRO: ABNORMAL /HPF (ref 0–4)

## 2023-12-24 PROCEDURE — 82962 GLUCOSE BLOOD TEST: CPT

## 2023-12-24 PROCEDURE — 99283 EMERGENCY DEPT VISIT LOW MDM: CPT

## 2023-12-24 PROCEDURE — 81001 URINALYSIS AUTO W/SCOPE: CPT

## 2023-12-24 PROCEDURE — 6370000000 HC RX 637 (ALT 250 FOR IP)

## 2023-12-24 RX ORDER — CYCLOBENZAPRINE HCL 10 MG
10 TABLET ORAL
Status: COMPLETED | OUTPATIENT
Start: 2023-12-24 | End: 2023-12-24

## 2023-12-24 RX ORDER — ACETAMINOPHEN 500 MG
1000 TABLET ORAL
Status: COMPLETED | OUTPATIENT
Start: 2023-12-24 | End: 2023-12-24

## 2023-12-24 RX ORDER — ACETAMINOPHEN 500 MG
1000 TABLET ORAL EVERY 6 HOURS PRN
Qty: 40 TABLET | Refills: 0 | Status: SHIPPED | OUTPATIENT
Start: 2023-12-24

## 2023-12-24 RX ORDER — CYCLOBENZAPRINE HCL 10 MG
10 TABLET ORAL 3 TIMES DAILY PRN
Qty: 15 TABLET | Refills: 0 | Status: SHIPPED | OUTPATIENT
Start: 2023-12-24 | End: 2024-01-03

## 2023-12-24 RX ADMIN — ACETAMINOPHEN 1000 MG: 500 TABLET ORAL at 11:21

## 2023-12-24 RX ADMIN — CYCLOBENZAPRINE 10 MG: 10 TABLET, FILM COATED ORAL at 11:21

## 2023-12-24 NOTE — DISCHARGE INSTRUCTIONS
Please return to the emergency department immediately if you experience numbness/weakness in your legs, numbness to your groin/perineal area, bowel/bladder incontinence, urinary retention, unable to walk, increased pain, or for any other symptoms that are concerning to you. Thank you! Thank you for allowing me to care for you in the emergency department. It is my goal to provide you with excellent care. If you have not received excellent quality care, please ask to speak to the nurse manager. Please fill out the survey that will come to you by mail or email since we listen to your feedback! Below you will find a list of your tests from today's visit. Should you have any questions, please do not hesitate to call the emergency department. Labs  Recent Results (from the past 12 hour(s))   Urinalysis with Reflex to Culture    Collection Time: 12/24/23 11:21 AM    Specimen: Urine   Result Value Ref Range    Color, UA Yellow/Straw      Appearance Clear Clear      Specific Gravity, UA 1.029 1.003 - 1.030      pH, Urine 6.0 5.0 - 8.0      Protein, UA Negative Negative mg/dL    Glucose, UA >500 (A) Negative mg/dL    Ketones, Urine Negative Negative mg/dL    Bilirubin Urine Negative Negative      Blood, Urine Negative Negative      Urobilinogen, Urine 0.1 0.1 - 1.0 EU/dL    Nitrite, Urine Negative Negative      Leukocyte Esterase, Urine Negative Negative      WBC, UA 0-4 0 - 4 /hpf    RBC, UA 0-5 0 - 5 /hpf    Epithelial Cells UA Few Few /lpf    BACTERIA, URINE Negative Negative /hpf    Urine Culture if Indicated Culture not indicated by UA result Culture not indicated by UA result         Radiologic Studies  No orders to display     ------------------------------------------------------------------------------------------------------------  The exam and treatment you received in the Emergency Department were for an urgent problem and are not intended as complete care.  It is important that you

## 2023-12-24 NOTE — ED PROVIDER NOTES
cyclobenzaprine (FLEXERIL) tablet 10 mg (0 mg Oral Held 12/24/23 1121)   acetaminophen (TYLENOL) tablet 1,000 mg (1,000 mg Oral Given 12/24/23 1121)       CONSULTS: (Who and What was discussed)  None     Social Determinants affecting Dx or Tx: None    Smoking Cessation: Not Applicable    PROCEDURES   Unless otherwise noted above, none. Procedures      CRITICAL CARE TIME   Patient does not meet Critical Care Time, 0 minutes    FINAL IMPRESSION   No diagnosis found. DISPOSITION/PLAN   DISPOSITION      Discharge Note: The patient is stable for discharge home. The signs, symptoms, diagnosis, and discharge instructions have been discussed, understanding conveyed, and agreed upon. The patient is to follow up as recommended or return to ER should their symptoms worsen. PATIENT REFERRED TO:  No follow-up provider specified.       DISCHARGE MEDICATIONS:     Medication List        ASK your doctor about these medications      acetaminophen 325 MG tablet  Commonly known as: TYLENOL     aspirin 81 MG EC tablet     atorvastatin 20 MG tablet  Commonly known as: LIPITOR     cyclobenzaprine 10 MG tablet  Commonly known as: FLEXERIL     dicyclomine 20 MG tablet  Commonly known as: BENTYL     empagliflozin 10 MG tablet  Commonly known as: JARDIANCE     HYDROcodone-acetaminophen 5-325 MG per tablet  Commonly known as: NORCO     ibuprofen 600 MG tablet  Commonly known as: ADVIL;MOTRIN     insulin glargine 100 UNIT/ML injection vial  Commonly known as: LANTUS     Insulin Glargine-yfgn 100 UNIT/ML Sopn     lisinopril 5 MG tablet  Commonly known as: PRINIVIL;ZESTRIL     methocarbamol 750 MG tablet  Commonly known as: ROBAXIN     metoprolol 100 MG tablet  Commonly known as: LOPRESSOR     NovoLOG FlexPen 100 UNIT/ML injection pen  Generic drug: insulin aspart     omeprazole 40 MG delayed release capsule  Commonly known as: PRILOSEC     pregabalin 75 MG capsule  Commonly known as: LYRICA  Take 1 capsule by mouth 3 times daily for

## 2024-01-08 ENCOUNTER — TELEPHONE (OUTPATIENT)
Age: 64
End: 2024-01-08

## 2024-01-08 NOTE — TELEPHONE ENCOUNTER
My mistake! Called the pt and cancelled the appt for 1/10 and let him know you'd switch his rx. He'd like it sent to [facility]. He'll be keeping feb appt with dr hebert.

## 2024-01-08 NOTE — TELEPHONE ENCOUNTER
Pt would like to discuss switching viagra rx to higher dose cialis, should he be put on dr. Laughlin's schedule or would he be able to see you about this?

## 2024-01-08 NOTE — TELEPHONE ENCOUNTER
He saw me last in June, so probably should see Qian.  That is not an urgent visit though, so next available is fine.    20 mg is the highest dose of Cialis.  If he just wants to switch to the Cialis then I can do that without an appointment.

## 2024-01-08 NOTE — TELEPHONE ENCOUNTER
Pt scheduled with you 1/10 to switch back to cialis and asked to schedule with dr hebert, has an appt for 2/8

## 2024-01-09 DIAGNOSIS — N52.8 OTHER MALE ERECTILE DYSFUNCTION: Primary | ICD-10-CM

## 2024-01-09 RX ORDER — TADALAFIL 20 MG/1
20 TABLET ORAL PRN
Qty: 30 TABLET | Refills: 1 | Status: SHIPPED | OUTPATIENT
Start: 2024-01-09

## 2024-01-23 ENCOUNTER — OFFICE VISIT (OUTPATIENT)
Age: 64
End: 2024-01-23
Payer: MEDICAID

## 2024-01-23 VITALS
HEART RATE: 93 BPM | DIASTOLIC BLOOD PRESSURE: 96 MMHG | RESPIRATION RATE: 16 BRPM | BODY MASS INDEX: 31.18 KG/M2 | SYSTOLIC BLOOD PRESSURE: 156 MMHG | HEIGHT: 74 IN | WEIGHT: 243 LBS | OXYGEN SATURATION: 94 %

## 2024-01-23 DIAGNOSIS — R35.0 URINARY FREQUENCY: ICD-10-CM

## 2024-01-23 DIAGNOSIS — N52.8 OTHER MALE ERECTILE DYSFUNCTION: Primary | ICD-10-CM

## 2024-01-23 DIAGNOSIS — N40.1 BENIGN PROSTATIC HYPERPLASIA WITH WEAK URINARY STREAM: ICD-10-CM

## 2024-01-23 DIAGNOSIS — R39.12 BENIGN PROSTATIC HYPERPLASIA WITH WEAK URINARY STREAM: ICD-10-CM

## 2024-01-23 DIAGNOSIS — R79.89 LOW TESTOSTERONE IN MALE: ICD-10-CM

## 2024-01-23 DIAGNOSIS — E11.42 DIABETIC POLYNEUROPATHY ASSOCIATED WITH TYPE 2 DIABETES MELLITUS (HCC): ICD-10-CM

## 2024-01-23 PROCEDURE — 3080F DIAST BP >= 90 MM HG: CPT | Performed by: UROLOGY

## 2024-01-23 PROCEDURE — 99214 OFFICE O/P EST MOD 30 MIN: CPT | Performed by: UROLOGY

## 2024-01-23 PROCEDURE — 3077F SYST BP >= 140 MM HG: CPT | Performed by: UROLOGY

## 2024-01-23 NOTE — PROGRESS NOTES
Chief Complaint   Patient presents with    Follow-up    Medication Adjustment     1. Have you been to the ER, urgent care clinic since your last visit?  Hospitalized since your last visit?No    2. Have you seen or consulted any other health care providers outside of the [facility] since your last visit?  Include any pap smears or colon screening. No  BP (!) 156/96 (Site: Left Upper Arm, Position: Sitting, Cuff Size: Large Adult)   Pulse 93   Resp 16   Ht 1.88 m (6' 2\")   Wt 110.2 kg (243 lb)   SpO2 94%   BMI 31.20 kg/m²      (3) adequate

## 2024-01-23 NOTE — PROGRESS NOTES
HISTORY OF PRESENT ILLNESS  Qasim Soler is a 63 y.o. male.   has a past medical history of Arrhythmia, Atrial fibrillation (HCC), CAD (coronary artery disease), Diabetes (HCC), Diverticulosis, Dysphagia, GERD (gastroesophageal reflux disease), Hx of hemorrhoids, Hypercholesterolemia, and Hypertension.  has a past surgical history that includes orthopedic surgery (Left); Colonoscopy; orthopedic surgery (Left); pr unlisted procedure cardiac surgery; and Shoulder arthroscopy (Right, 06/09/2021).  Chief Complaint   Patient presents with    Follow-up    Medication Adjustment     He is here playing [employer].     He says he is not doing well with his diabetes management.  He says he is not eating right.      He can function \"so-so\" with tadalafil.  Rx renewed.     He has some irritation of the scrotum.  He is not sure if he had a reaction to detergent.  He notes no swelling.            1. Other male erectile dysfunction  Overview:  Issues since 2020 with achieving full erection and maintaining erections. Contributory factors include HTN, diabetes (poorly controlled).    He was advised on JUSTIN, penile injections and IPP as well.  He is on tadalafil, 20 mg PRN.    Assessment & Plan:  He is functional with medication.  It is so so.  He will continue tadalafil.   2. Urinary frequency  Overview:  Patient reports that he has been getting up to urinate 8-10 times a night. Associated symptoms include polydipsia.   He also has issues with constipation and poor glucose control.    Assessment & Plan:  Multifactorial frequency.  Probably his diabetic control will affect this more than BPH.  He is again counseled on stricter dietary control.   3. Benign prostatic hyperplasia with weak urinary stream  Overview:  Moderate symptoms, decreased flow is improved on tamsulosin.  Increased frequency due to poorly controlled diabetes.  He is not overly bothered.    4. Low testosterone in male  Overview:  Variable levels and compliance with a

## 2024-01-25 ENCOUNTER — OFFICE VISIT (OUTPATIENT)
Age: 64
End: 2024-01-25
Payer: MEDICAID

## 2024-01-25 VITALS
DIASTOLIC BLOOD PRESSURE: 81 MMHG | HEART RATE: 101 BPM | RESPIRATION RATE: 22 BRPM | OXYGEN SATURATION: 97 % | TEMPERATURE: 97.8 F | SYSTOLIC BLOOD PRESSURE: 130 MMHG | BODY MASS INDEX: 31.58 KG/M2 | WEIGHT: 246.1 LBS | HEIGHT: 74 IN

## 2024-01-25 DIAGNOSIS — I10 PRIMARY HYPERTENSION: ICD-10-CM

## 2024-01-25 DIAGNOSIS — E66.9 OBESITY (BMI 30.0-34.9): ICD-10-CM

## 2024-01-25 DIAGNOSIS — Z91.199 NONCOMPLIANCE: ICD-10-CM

## 2024-01-25 DIAGNOSIS — E11.42 TYPE 2 DIABETES MELLITUS WITH DIABETIC POLYNEUROPATHY, WITHOUT LONG-TERM CURRENT USE OF INSULIN (HCC): Primary | ICD-10-CM

## 2024-01-25 LAB
GLUCOSE, POC: 268 MG/DL
HBA1C MFR BLD: 12.2 %

## 2024-01-25 PROCEDURE — 99205 OFFICE O/P NEW HI 60 MIN: CPT | Performed by: STUDENT IN AN ORGANIZED HEALTH CARE EDUCATION/TRAINING PROGRAM

## 2024-01-25 PROCEDURE — 3079F DIAST BP 80-89 MM HG: CPT | Performed by: STUDENT IN AN ORGANIZED HEALTH CARE EDUCATION/TRAINING PROGRAM

## 2024-01-25 PROCEDURE — 83036 HEMOGLOBIN GLYCOSYLATED A1C: CPT | Performed by: STUDENT IN AN ORGANIZED HEALTH CARE EDUCATION/TRAINING PROGRAM

## 2024-01-25 PROCEDURE — 82962 GLUCOSE BLOOD TEST: CPT | Performed by: STUDENT IN AN ORGANIZED HEALTH CARE EDUCATION/TRAINING PROGRAM

## 2024-01-25 PROCEDURE — 3075F SYST BP GE 130 - 139MM HG: CPT | Performed by: STUDENT IN AN ORGANIZED HEALTH CARE EDUCATION/TRAINING PROGRAM

## 2024-01-25 RX ORDER — PERPHENAZINE 16 MG/1
TABLET, FILM COATED ORAL
COMMUNITY
Start: 2023-11-13

## 2024-01-25 RX ORDER — ACYCLOVIR 400 MG/1
TABLET ORAL
Qty: 10 EACH | Refills: 0 | Status: SHIPPED | OUTPATIENT
Start: 2024-01-25

## 2024-01-25 RX ORDER — NAPROXEN 500 MG/1
TABLET ORAL
COMMUNITY
Start: 2023-02-21

## 2024-01-25 RX ORDER — FLURBIPROFEN SODIUM 0.3 MG/ML
SOLUTION/ DROPS OPHTHALMIC
COMMUNITY
Start: 2023-11-06

## 2024-01-25 RX ORDER — GLUCOSAM/CHON-MSM1/C/MANG/BOSW 500-416.6
TABLET ORAL
COMMUNITY
Start: 2023-11-13

## 2024-01-25 RX ORDER — GABAPENTIN 300 MG/1
CAPSULE ORAL
COMMUNITY
Start: 2021-01-28

## 2024-01-25 RX ORDER — ISOSORBIDE MONONITRATE 30 MG/1
30 TABLET, EXTENDED RELEASE ORAL DAILY
COMMUNITY
Start: 2023-05-22

## 2024-01-25 RX ORDER — ACYCLOVIR 400 MG/1
TABLET ORAL
Qty: 1 EACH | Refills: 0 | Status: SHIPPED | OUTPATIENT
Start: 2024-01-25

## 2024-01-25 RX ORDER — BLOOD-GLUCOSE METER
EACH MISCELLANEOUS
COMMUNITY
Start: 2023-11-13

## 2024-01-25 RX ORDER — PANTOPRAZOLE SODIUM 40 MG/1
40 TABLET, DELAYED RELEASE ORAL DAILY
COMMUNITY
Start: 2023-04-26 | End: 2024-01-25

## 2024-01-25 NOTE — PROGRESS NOTES
[facility]                                                                                    Latasha Edge M.D          Patient Information  Date:1/25/2024  Name : Qasim Soler 63 y.o.     YOB: 1960         Referred by: Cata Lewis MD       Chief Complaint   Patient presents with    New Patient    Diabetes       History of Present Illness: Qasim Soler is a 63 y.o. male with type 2 DM, htn, who presented to establish care       Type 2 diabetes mellitus    Glucometer reading:    Results for orders placed or performed in visit on 01/25/24   AMB POC GLUCOSE BLOOD, BY GLUCOSE MONITORING DEVICE   Result Value Ref Range    Glucose,  MG/DL   AMB POC HEMOGLOBIN A1C   Result Value Ref Range    Hemoglobin A1C, POC 12.2 %       · Diagnosis: 2018   · Family history of diabetes Mellitus  yes parents   · Current treatment: Lantus 40 units + novolog twice a day dose 10-15 units   · Past treatment: metformin (dc'ed after insulin started)   · Glucose checks got his meter : 248,368,520,227,  · Hyperglycemia: yes   · Hypoglycemia: has symptoms   · Meals per day: 3  ---Breakfast : eggs   ----Lunch :sandwich   ----Dinner: fish   -----Snacks: no   -----Drinks:  1-2 beers/ day   · Exercise:  none   Steroids:none   · DM related hospitalizations:     Smoking: no  Family history of coronary artery disease/stroke: no     Complications of DM:  · CAD: no  · CVA: no  · PVD: no  · Amputations: no   Retinopathy no; last exam was in 2022, had cataract    · Gastropathy: no  · Nephropathy: no  · Neuropathy: no   Sees podiatrist:    Medications:  · Statin: atovastatin 20 mg daily   · ACE-I: lisinopril 5 mg   · ASA: no      · Diabetes education: no       Past Medical History:   Diagnosis Date    Arrhythmia     Hx of AFIB    Atrial fibrillation (HCC)     CAD (coronary artery disease)     Diabetes (HCC)     Diverticulosis     Dysphagia     GERD (gastroesophageal reflux disease)

## 2024-01-25 NOTE — PROGRESS NOTES
1. \"Have you been to the ER, urgent care clinic since your last visit?  Hospitalized since your last visit?\" No    2. \"Have you seen or consulted any other health care providers outside of the [facility] since your last visit?\" Yes    3. For patients aged 45-75: Has the patient had a colonoscopy / FIT/ Cologuard? No      If the patient is female:    4. For patients aged 40-74: Has the patient had a mammogram within the past 2 years?       5. For patients aged 21-65: Has the patient had a pap smear?     Chief Complaint   Patient presents with    New Patient    Diabetes       /81 (Site: Left Upper Arm, Position: Sitting, Cuff Size: Large Adult)   Pulse (!) 101   Temp 97.8 °F (36.6 °C) (Oral)   Resp 22   Ht 1.88 m (6' 2\")   Wt 111.6 kg (246 lb 1.6 oz)   SpO2 97%   BMI 31.60 kg/m²

## 2024-01-25 NOTE — PATIENT INSTRUCTIONS
Continue Lantus  45 units   Start novolog 12 units If blood glucose 90- 150  and you are going to eat  Low dose correction Scale   Dosing Scale:  150-200 mg/dL add 2 unit insulin so 12+ 2 = 14 units   201-250 mg/dL add 4 units insulin  251-300 mg/dL add 5 units insulin  301-350 mg/dL add 6 units insulin  351-400 mg/dL add 7 units insulin  401-450 mg/dL add 6 units insulin  >450 mg/dL add 7 units insulin     Continue jardiance- confirm the dose if you are taking 10 mg can increase to 20 mg and let us know I will send prrescription 25 mg

## 2024-02-05 ENCOUNTER — OFFICE VISIT (OUTPATIENT)
Age: 64
End: 2024-02-05

## 2024-02-05 VITALS — BODY MASS INDEX: 31.57 KG/M2 | WEIGHT: 246 LBS | HEIGHT: 74 IN

## 2024-02-05 DIAGNOSIS — E11.42 TYPE 2 DIABETES MELLITUS WITH DIABETIC POLYNEUROPATHY, WITHOUT LONG-TERM CURRENT USE OF INSULIN (HCC): Primary | ICD-10-CM

## 2024-02-12 NOTE — PROGRESS NOTES
[facility]      Subjective:         Patient is a 63 y.o. male who is being seen as a returning pt for a diabetic pedal exam.  Patient states he has close follow-up with his primary care provider (Cata Lewis MD). He states his last office visit with his primary care provider was 10/25/2023.  States diabetes is under control but cannot recall his last hemoglobin A1c.  He denies any pedal pain due to his diabetic peripheral neuropathy.  He denies any recent trauma.  He denies any breaks skin or systemic signs of infection.  He does state his toenails are thick/discolored. He denies any other pedal complaints      Past Medical History:   Diagnosis Date    Arrhythmia     Hx of AFIB    Atrial fibrillation (HCC)     CAD (coronary artery disease)     Diabetes (HCC)     Diverticulosis     Dysphagia     GERD (gastroesophageal reflux disease)     Hx of hemorrhoids     Hypercholesterolemia     Hypertension      Past Surgical History:   Procedure Laterality Date    COLONOSCOPY      ORTHOPEDIC SURGERY Left     big toe amputated    ORTHOPEDIC SURGERY Left     wrist     CT UNLISTED PROCEDURE CARDIAC SURGERY      Atrial fibrilation     SHOULDER ARTHROSCOPY Right 06/09/2021       Family History   Problem Relation Age of Onset    Cancer Father       Social History     Tobacco Use    Smoking status: Former     Current packs/day: 0.25     Average packs/day: 0.3 packs/day for 45.0 years (11.3 ttl pk-yrs)     Types: Cigarettes    Smokeless tobacco: Never   Substance Use Topics    Alcohol use: Yes     Alcohol/week: 14.0 standard drinks of alcohol     Types: 14 Cans of beer per week     No Known Allergies  Prior to Admission medications    Medication Sig Start Date End Date Taking? Authorizing Provider   gabapentin (NEURONTIN) 300 MG capsule  1/28/21  Yes Provider, MD Cosmo   isosorbide mononitrate (IMDUR) 30 MG extended release tablet Take 1 tablet by mouth daily 5/22/23  Yes

## 2024-03-04 ENCOUNTER — HOSPITAL ENCOUNTER (EMERGENCY)
Facility: HOSPITAL | Age: 64
Discharge: HOME | End: 2024-03-04
Attending: EMERGENCY MEDICINE
Payer: MEDICAID

## 2024-03-04 ENCOUNTER — APPOINTMENT (OUTPATIENT)
Facility: HOSPITAL | Age: 64
End: 2024-03-04
Payer: MEDICAID

## 2024-03-04 VITALS
RESPIRATION RATE: 14 BRPM | OXYGEN SATURATION: 96 % | BODY MASS INDEX: 31.06 KG/M2 | SYSTOLIC BLOOD PRESSURE: 140 MMHG | WEIGHT: 242 LBS | HEIGHT: 74 IN | DIASTOLIC BLOOD PRESSURE: 95 MMHG | HEART RATE: 98 BPM | TEMPERATURE: 97.5 F

## 2024-03-04 DIAGNOSIS — R07.9 ACUTE CHEST PAIN: Primary | ICD-10-CM

## 2024-03-04 LAB
ANION GAP SERPL CALC-SCNC: 6 MMOL/L (ref 5–15)
BASOPHILS # BLD: 0 K/UL (ref 0–0.1)
BASOPHILS NFR BLD: 0 % (ref 0–1)
BUN SERPL-MCNC: 9 MG/DL (ref 6–20)
BUN/CREAT SERPL: 10 (ref 12–20)
CA-I BLD-MCNC: 10 MG/DL (ref 8.5–10.1)
CHLORIDE SERPL-SCNC: 104 MMOL/L (ref 97–108)
CO2 SERPL-SCNC: 26 MMOL/L (ref 21–32)
CREAT SERPL-MCNC: 0.88 MG/DL (ref 0.7–1.3)
D DIMER PPP FEU-MCNC: 0.65 UG/ML(FEU)
DIFFERENTIAL METHOD BLD: ABNORMAL
EKG ATRIAL RATE: 105 BPM
EKG DIAGNOSIS: NORMAL
EKG P AXIS: 40 DEGREES
EKG P-R INTERVAL: 128 MS
EKG Q-T INTERVAL: 356 MS
EKG QRS DURATION: 74 MS
EKG QTC CALCULATION (BAZETT): 470 MS
EKG R AXIS: 11 DEGREES
EKG T AXIS: 72 DEGREES
EKG VENTRICULAR RATE: 105 BPM
EOSINOPHIL # BLD: 0.2 K/UL (ref 0–0.4)
EOSINOPHIL NFR BLD: 3 % (ref 0–7)
ERYTHROCYTE [DISTWIDTH] IN BLOOD BY AUTOMATED COUNT: 12.5 % (ref 11.5–14.5)
GLUCOSE SERPL-MCNC: 288 MG/DL (ref 65–100)
HCT VFR BLD AUTO: 49 % (ref 36.6–50.3)
HGB BLD-MCNC: 16.4 G/DL (ref 12.1–17)
IMM GRANULOCYTES # BLD AUTO: 0 K/UL (ref 0–0.04)
IMM GRANULOCYTES NFR BLD AUTO: 0 % (ref 0–0.5)
LACTATE BLD-SCNC: 1.46 MMOL/L (ref 0.4–2)
LYMPHOCYTES # BLD: 2.1 K/UL (ref 0.8–3.5)
LYMPHOCYTES NFR BLD: 35 % (ref 12–49)
MCH RBC QN AUTO: 28.4 PG (ref 26–34)
MCHC RBC AUTO-ENTMCNC: 33.5 G/DL (ref 30–36.5)
MCV RBC AUTO: 84.9 FL (ref 80–99)
MONOCYTES # BLD: 0.5 K/UL (ref 0–1)
MONOCYTES NFR BLD: 8 % (ref 5–13)
NEUTS SEG # BLD: 3.3 K/UL (ref 1.8–8)
NEUTS SEG NFR BLD: 54 % (ref 32–75)
NRBC # BLD: 0 K/UL (ref 0–0.01)
NRBC BLD-RTO: 0 PER 100 WBC
PERFORMED BY:: NORMAL
PLATELET # BLD AUTO: 240 K/UL (ref 150–400)
PMV BLD AUTO: 10.6 FL (ref 8.9–12.9)
POTASSIUM SERPL-SCNC: 3.8 MMOL/L (ref 3.5–5.1)
RBC # BLD AUTO: 5.77 M/UL (ref 4.1–5.7)
SODIUM SERPL-SCNC: 136 MMOL/L (ref 136–145)
TROPONIN I SERPL HS-MCNC: 6 NG/L (ref 0–76)
TROPONIN I SERPL HS-MCNC: 7 NG/L (ref 0–76)
WBC # BLD AUTO: 6.2 K/UL (ref 4.1–11.1)

## 2024-03-04 PROCEDURE — 96360 HYDRATION IV INFUSION INIT: CPT

## 2024-03-04 PROCEDURE — 99285 EMERGENCY DEPT VISIT HI MDM: CPT

## 2024-03-04 PROCEDURE — 80048 BASIC METABOLIC PNL TOTAL CA: CPT

## 2024-03-04 PROCEDURE — 93005 ELECTROCARDIOGRAM TRACING: CPT | Performed by: EMERGENCY MEDICINE

## 2024-03-04 PROCEDURE — 85025 COMPLETE CBC W/AUTO DIFF WBC: CPT

## 2024-03-04 PROCEDURE — 36415 COLL VENOUS BLD VENIPUNCTURE: CPT

## 2024-03-04 PROCEDURE — 2580000003 HC RX 258: Performed by: EMERGENCY MEDICINE

## 2024-03-04 PROCEDURE — 84484 ASSAY OF TROPONIN QUANT: CPT

## 2024-03-04 PROCEDURE — 83605 ASSAY OF LACTIC ACID: CPT

## 2024-03-04 PROCEDURE — 94762 N-INVAS EAR/PLS OXIMTRY CONT: CPT

## 2024-03-04 PROCEDURE — 85379 FIBRIN DEGRADATION QUANT: CPT

## 2024-03-04 PROCEDURE — 71275 CT ANGIOGRAPHY CHEST: CPT

## 2024-03-04 PROCEDURE — 6370000000 HC RX 637 (ALT 250 FOR IP): Performed by: EMERGENCY MEDICINE

## 2024-03-04 PROCEDURE — 87040 BLOOD CULTURE FOR BACTERIA: CPT

## 2024-03-04 PROCEDURE — 6360000004 HC RX CONTRAST MEDICATION: Performed by: EMERGENCY MEDICINE

## 2024-03-04 PROCEDURE — 71046 X-RAY EXAM CHEST 2 VIEWS: CPT

## 2024-03-04 RX ORDER — ACETAMINOPHEN 500 MG
1000 TABLET ORAL
Status: COMPLETED | OUTPATIENT
Start: 2024-03-04 | End: 2024-03-04

## 2024-03-04 RX ORDER — 0.9 % SODIUM CHLORIDE 0.9 %
1000 INTRAVENOUS SOLUTION INTRAVENOUS ONCE
Status: COMPLETED | OUTPATIENT
Start: 2024-03-04 | End: 2024-03-04

## 2024-03-04 RX ADMIN — SODIUM CHLORIDE 1000 ML: 9 INJECTION, SOLUTION INTRAVENOUS at 16:07

## 2024-03-04 RX ADMIN — ACETAMINOPHEN 1000 MG: 500 TABLET ORAL at 16:06

## 2024-03-04 RX ADMIN — IOPAMIDOL 100 ML: 755 INJECTION, SOLUTION INTRAVENOUS at 14:03

## 2024-03-04 ASSESSMENT — VISUAL ACUITY
OS: 20/25
OU: 20/25
OD: 20/40

## 2024-03-04 ASSESSMENT — PAIN SCALES - GENERAL
PAINLEVEL_OUTOF10: 5
PAINLEVEL_OUTOF10: 3
PAINLEVEL_OUTOF10: 5
PAINLEVEL_OUTOF10: 3

## 2024-03-04 ASSESSMENT — LIFESTYLE VARIABLES
HOW OFTEN DO YOU HAVE A DRINK CONTAINING ALCOHOL: NEVER
HOW MANY STANDARD DRINKS CONTAINING ALCOHOL DO YOU HAVE ON A TYPICAL DAY: PATIENT DOES NOT DRINK

## 2024-03-04 ASSESSMENT — PAIN DESCRIPTION - LOCATION: LOCATION: ABDOMEN

## 2024-03-04 ASSESSMENT — HEART SCORE: ECG: 1

## 2024-03-04 ASSESSMENT — PAIN - FUNCTIONAL ASSESSMENT
PAIN_FUNCTIONAL_ASSESSMENT: 0-10

## 2024-03-04 ASSESSMENT — PAIN DESCRIPTION - ORIENTATION: ORIENTATION: UPPER

## 2024-03-04 NOTE — DISCHARGE INSTRUCTIONS
Thank you!  Thank you for allowing me to care for you in the emergency department. It is my goal to provide you with excellent care.  Please fill out the survey that will come to you by mail or email since we listen to your feedback!     Below you will find a list of your tests from today's visit.  Should you have any questions, please do not hesitate to call the emergency department.    Labs  Recent Results (from the past 12 hour(s))   EKG 12 Lead    Collection Time: 03/04/24 11:56 AM   Result Value Ref Range    Ventricular Rate 105 BPM    Atrial Rate 105 BPM    P-R Interval 128 ms    QRS Duration 74 ms    Q-T Interval 356 ms    QTc Calculation (Bazett) 470 ms    P Axis 40 degrees    R Axis 11 degrees    T Axis 72 degrees    Diagnosis       Sinus tachycardia  Nonspecific T wave abnormality  Abnormal ECG  When compared with ECG of 10-DEC-2023 07:32,  No significant change was found  Confirmed by Kalpesh Jewell ([id number]) on 3/4/2024 1:10:41 PM     Basic Metabolic Panel    Collection Time: 03/04/24 12:26 PM   Result Value Ref Range    Sodium 136 136 - 145 mmol/L    Potassium 3.8 3.5 - 5.1 mmol/L    Chloride 104 97 - 108 mmol/L    CO2 26 21 - 32 mmol/L    Anion Gap 6 5 - 15 mmol/L    Glucose 288 (H) 65 - 100 mg/dL    BUN 9 6 - 20 mg/dL    Creatinine 0.88 0.70 - 1.30 mg/dL    Bun/Cre Ratio 10 (L) 12 - 20      Est, Glom Filt Rate >60 >60 ml/min/1.73m2    Calcium 10.0 8.5 - 10.1 mg/dL   CBC with Auto Differential    Collection Time: 03/04/24 12:26 PM   Result Value Ref Range    WBC 6.2 4.1 - 11.1 K/uL    RBC 5.77 (H) 4.10 - 5.70 M/uL    Hemoglobin 16.4 12.1 - 17.0 g/dL    Hematocrit 49.0 36.6 - 50.3 %    MCV 84.9 80.0 - 99.0 FL    MCH 28.4 26.0 - 34.0 PG    MCHC 33.5 30.0 - 36.5 g/dL    RDW 12.5 11.5 - 14.5 %    Platelets 240 150 - 400 K/uL    MPV 10.6 8.9 - 12.9 FL    Nucleated RBCs 0.0 0.0  WBC    nRBC 0.00 0.00 - 0.01 K/uL    Neutrophils % 54 32 - 75 %    Lymphocytes % 35 12 - 49 %    Monocytes % 8 5 - 13

## 2024-03-04 NOTE — ED PROVIDER NOTES
[facility] EMERGENCY DEPT  EMERGENCY DEPARTMENT HISTORY AND PHYSICAL EXAM      Date: 3/4/2024  Patient Name: Qasim Soler  MRN: [id number]  Birthdate 1960  Date of evaluation: 3/4/2024  Provider: Aura Tolbert MD   Note Started: 12:40 PM EST 3/4/24    HISTORY OF PRESENT ILLNESS     Chief Complaint   Patient presents with    Chest Pain       History Provided By: Patient    HPI: Qasim Soler is a 63 y.o. male Past medical history of atrial fibrillation, coronary artery disease, diabetes, GERD, hypertension presents for evaluation of bandlike chest pain.  Symptoms present for about 3 days.  Worse with movement.  Has a remote history of a normal cardiac cath and states he is scheduled for a stress test later this month at [facility] cardiology.  No associated shortness of breath however pain is pleuritic.  No history of VTE or unilateral leg swelling.    PAST MEDICAL HISTORY   Past Medical History:  Past Medical History:   Diagnosis Date    Arrhythmia     Hx of AFIB    Atrial fibrillation (HCC)     CAD (coronary artery disease)     Diabetes (HCC)     Diverticulosis     Dysphagia     GERD (gastroesophageal reflux disease)     Hx of hemorrhoids     Hypercholesterolemia     Hypertension        Past Surgical History:  Past Surgical History:   Procedure Laterality Date    COLONOSCOPY      ORTHOPEDIC SURGERY Left     big toe amputated    ORTHOPEDIC SURGERY Left     wrist     [place] UNLISTED PROCEDURE CARDIAC SURGERY      Atrial fibrilation     SHOULDER ARTHROSCOPY Right 06/09/2021       Family History:  Family History   Problem Relation Age of Onset    Cancer Father        Social History:  Social History     Tobacco Use    Smoking status: Former     Current packs/day: 0.25     Average packs/day: 0.3 packs/day for 45.0 years (11.3 ttl pk-yrs)     Types: Cigarettes    Smokeless tobacco: Never   Vaping Use    Vaping Use: Never used   Substance Use Topics    Alcohol use: Yes     Alcohol/week: 14.0 standard drinks of alcohol

## 2024-03-10 LAB
BACTERIA SPEC CULT: NORMAL
BACTERIA SPEC CULT: NORMAL
Lab: NORMAL
Lab: NORMAL

## 2024-04-30 ENCOUNTER — OFFICE VISIT (OUTPATIENT)
Age: 64
End: 2024-04-30
Payer: MEDICAID

## 2024-04-30 VITALS
HEIGHT: 74 IN | SYSTOLIC BLOOD PRESSURE: 120 MMHG | BODY MASS INDEX: 31.92 KG/M2 | WEIGHT: 248.7 LBS | TEMPERATURE: 97.9 F | OXYGEN SATURATION: 96 % | HEART RATE: 105 BPM | RESPIRATION RATE: 20 BRPM | DIASTOLIC BLOOD PRESSURE: 76 MMHG

## 2024-04-30 DIAGNOSIS — I10 PRIMARY HYPERTENSION: ICD-10-CM

## 2024-04-30 DIAGNOSIS — Z91.199 NONCOMPLIANCE: ICD-10-CM

## 2024-04-30 DIAGNOSIS — E66.9 OBESITY (BMI 30.0-34.9): ICD-10-CM

## 2024-04-30 DIAGNOSIS — E11.42 TYPE 2 DIABETES MELLITUS WITH DIABETIC POLYNEUROPATHY, WITHOUT LONG-TERM CURRENT USE OF INSULIN (HCC): Primary | ICD-10-CM

## 2024-04-30 LAB — HBA1C MFR BLD: 12.4 %

## 2024-04-30 PROCEDURE — 3078F DIAST BP <80 MM HG: CPT | Performed by: STUDENT IN AN ORGANIZED HEALTH CARE EDUCATION/TRAINING PROGRAM

## 2024-04-30 PROCEDURE — 83036 HEMOGLOBIN GLYCOSYLATED A1C: CPT | Performed by: STUDENT IN AN ORGANIZED HEALTH CARE EDUCATION/TRAINING PROGRAM

## 2024-04-30 PROCEDURE — 99213 OFFICE O/P EST LOW 20 MIN: CPT | Performed by: STUDENT IN AN ORGANIZED HEALTH CARE EDUCATION/TRAINING PROGRAM

## 2024-04-30 PROCEDURE — 3074F SYST BP LT 130 MM HG: CPT | Performed by: STUDENT IN AN ORGANIZED HEALTH CARE EDUCATION/TRAINING PROGRAM

## 2024-04-30 RX ORDER — INSULIN ASPART 100 [IU]/ML
14 INJECTION, SOLUTION INTRAVENOUS; SUBCUTANEOUS
Qty: 5 ADJUSTABLE DOSE PRE-FILLED PEN SYRINGE | Refills: 1 | Status: SHIPPED | OUTPATIENT
Start: 2024-04-30

## 2024-04-30 NOTE — PROGRESS NOTES
[facility]                                                                                    Latasha Edge M.D          Patient Information  Date:4/30/2024  Name : Qasim Soler 63 y.o.     YOB: 1960         Referred by: Cata Lewis MD       Chief Complaint   Patient presents with    Type 2 diabetes mellitus with diabetic polyneuropathy, with       History of Present Illness: Qasim Soler is a 63 y.o. male with type 2 DM, htn, who presented to follow up.     Has not put sensor.   Taking Lantus 60 TWICE a day   Novolog 10- 12 units with scale  Missed DM education program calls   Unable to clarify jardiance dose  Did not bring meds  Did not bring meter         Type 2 diabetes mellitus    Results for orders placed or performed in visit on 04/30/24   AMB POC HEMOGLOBIN A1C   Result Value Ref Range    Hemoglobin A1C, POC 12.4 %             · Diagnosis: 2018   · Family history of diabetes Mellitus  yes parents   · Current treatment: Lantus 40 units + novolog twice a day dose 10-15 units   · Past treatment: metformin (dc'ed after insulin started)   · Glucose checks got his meter : 248,368,520,227,  · Hyperglycemia: yes   · Hypoglycemia: has symptoms   · Meals per day: 3  ---Breakfast : eggs   ----Lunch :sandwich   ----Dinner: fish   -----Snacks: no   -----Drinks:  1-2 beers/ day   · Exercise:  none   Steroids:none   · DM related hospitalizations:     Smoking: no  Family history of coronary artery disease/stroke: no     Complications of DM:  · CAD: no  · CVA: no  · PVD: no  · Amputations: no   Retinopathy no; last exam was in 2022, had cataract    · Gastropathy: no  · Nephropathy: no  · Neuropathy: no   Sees podiatrist:    Medications:  · Statin: atovastatin 20 mg daily   · ACE-I: lisinopril 5 mg   · ASA: no      · Diabetes education: no       Past Medical History:   Diagnosis Date    Arrhythmia     Hx of AFIB    Atrial fibrillation (HCC)     CAD

## 2024-04-30 NOTE — PATIENT INSTRUCTIONS
Please bring your sensor to your visit on Friday   Please call diabetes educator to set up appointment   Please get you medications to your next visit with us   Lantus 50 units  (grey) twice a day   Novolog ( blue) 14 units with meals plus scale

## 2024-05-03 ENCOUNTER — OFFICE VISIT (OUTPATIENT)
Age: 64
End: 2024-05-03

## 2024-05-03 DIAGNOSIS — E11.42 TYPE 2 DIABETES MELLITUS WITH DIABETIC POLYNEUROPATHY, WITHOUT LONG-TERM CURRENT USE OF INSULIN (HCC): Primary | ICD-10-CM

## 2024-06-04 ENCOUNTER — OFFICE VISIT (OUTPATIENT)
Age: 64
End: 2024-06-04
Payer: MEDICARE

## 2024-06-04 VITALS
HEIGHT: 74 IN | HEART RATE: 114 BPM | SYSTOLIC BLOOD PRESSURE: 105 MMHG | TEMPERATURE: 98 F | DIASTOLIC BLOOD PRESSURE: 72 MMHG | RESPIRATION RATE: 20 BRPM | WEIGHT: 248 LBS | BODY MASS INDEX: 31.83 KG/M2 | OXYGEN SATURATION: 93 %

## 2024-06-04 DIAGNOSIS — I10 PRIMARY HYPERTENSION: ICD-10-CM

## 2024-06-04 DIAGNOSIS — E66.9 OBESITY (BMI 30.0-34.9): ICD-10-CM

## 2024-06-04 DIAGNOSIS — Z91.199 NONCOMPLIANCE: ICD-10-CM

## 2024-06-04 DIAGNOSIS — E11.42 TYPE 2 DIABETES MELLITUS WITH DIABETIC POLYNEUROPATHY, WITHOUT LONG-TERM CURRENT USE OF INSULIN (HCC): Primary | ICD-10-CM

## 2024-06-04 DIAGNOSIS — N52.8 OTHER MALE ERECTILE DYSFUNCTION: ICD-10-CM

## 2024-06-04 LAB — GLUCOSE, POC: 321 MG/DL

## 2024-06-04 PROCEDURE — 3074F SYST BP LT 130 MM HG: CPT | Performed by: STUDENT IN AN ORGANIZED HEALTH CARE EDUCATION/TRAINING PROGRAM

## 2024-06-04 PROCEDURE — G8427 DOCREV CUR MEDS BY ELIG CLIN: HCPCS | Performed by: STUDENT IN AN ORGANIZED HEALTH CARE EDUCATION/TRAINING PROGRAM

## 2024-06-04 PROCEDURE — 2022F DILAT RTA XM EVC RTNOPTHY: CPT | Performed by: STUDENT IN AN ORGANIZED HEALTH CARE EDUCATION/TRAINING PROGRAM

## 2024-06-04 PROCEDURE — 82962 GLUCOSE BLOOD TEST: CPT | Performed by: STUDENT IN AN ORGANIZED HEALTH CARE EDUCATION/TRAINING PROGRAM

## 2024-06-04 PROCEDURE — 3078F DIAST BP <80 MM HG: CPT | Performed by: STUDENT IN AN ORGANIZED HEALTH CARE EDUCATION/TRAINING PROGRAM

## 2024-06-04 PROCEDURE — G8417 CALC BMI ABV UP PARAM F/U: HCPCS | Performed by: STUDENT IN AN ORGANIZED HEALTH CARE EDUCATION/TRAINING PROGRAM

## 2024-06-04 PROCEDURE — 1036F TOBACCO NON-USER: CPT | Performed by: STUDENT IN AN ORGANIZED HEALTH CARE EDUCATION/TRAINING PROGRAM

## 2024-06-04 PROCEDURE — 99213 OFFICE O/P EST LOW 20 MIN: CPT | Performed by: STUDENT IN AN ORGANIZED HEALTH CARE EDUCATION/TRAINING PROGRAM

## 2024-06-04 PROCEDURE — 3046F HEMOGLOBIN A1C LEVEL >9.0%: CPT | Performed by: STUDENT IN AN ORGANIZED HEALTH CARE EDUCATION/TRAINING PROGRAM

## 2024-06-04 PROCEDURE — 3017F COLORECTAL CA SCREEN DOC REV: CPT | Performed by: STUDENT IN AN ORGANIZED HEALTH CARE EDUCATION/TRAINING PROGRAM

## 2024-06-04 RX ORDER — INSULIN GLARGINE 100 [IU]/ML
INJECTION, SOLUTION SUBCUTANEOUS
COMMUNITY
Start: 2024-04-18 | End: 2024-06-04

## 2024-06-04 RX ORDER — ACYCLOVIR 400 MG/1
TABLET ORAL
Qty: 9 EACH | Refills: 0 | Status: SHIPPED | OUTPATIENT
Start: 2024-06-04 | End: 2024-06-05

## 2024-06-04 RX ORDER — LISINOPRIL 5 MG/1
5 TABLET ORAL DAILY
Qty: 30 TABLET | Refills: 2 | Status: SHIPPED | OUTPATIENT
Start: 2024-06-04

## 2024-06-04 RX ORDER — INSULIN GLARGINE 100 [IU]/ML
50 INJECTION, SOLUTION SUBCUTANEOUS DAILY
Qty: 10 ML | Refills: 0 | Status: SHIPPED | OUTPATIENT
Start: 2024-06-04 | End: 2024-06-07

## 2024-06-04 NOTE — PROGRESS NOTES
Chief Complaint   Patient presents with    Follow-up    Diabetes     /72 (Site: Left Upper Arm, Position: Sitting, Cuff Size: Large Adult)   Pulse (!) 114   Temp 98 °F (36.7 °C) (Oral)   Resp 20   Ht 1.88 m (6' 2\")   Wt 112.5 kg (248 lb)   SpO2 93%   BMI 31.84 kg/m²

## 2024-06-04 NOTE — PROGRESS NOTES
[facility] [place] [facility]                                                                                    Latasha Edge M.D          Patient Information  Date:6/4/2024  Name : Qasim Soler 63 y.o.     YOB: 1960         Referred by: Cata Lewis MD       Chief Complaint   Patient presents with    Follow-up    Diabetes       History of Present Illness: Qasim Soler is a 63 y.o. male with type 2 DM, htn, who presented to follow up.     6/5/24   Presents for follow up. Put sensor but came off.   Now stating taking Lantus once a day          5/1/24   Has not put sensor.   Taking Lantus 60 TWICE a day   Novolog 10- 12 units with scale  Missed DM education program calls   Unable to clarify jardiance dose  Did not bring meds  Did not bring meter         Type 2 diabetes mellitus    No results found for this visit on 06/04/24.            · Diagnosis: 2018   · Family history of diabetes Mellitus  yes parents   · Current treatment: Lantus 40 units + novolog twice a day dose 10-15 units   · Past treatment: metformin (dc'ed after insulin started)   · Glucose checks got his meter : [phone],227,  · Hyperglycemia: yes   · Hypoglycemia: has symptoms   · Meals per day: 3  ---Breakfast : eggs   ----Lunch :sandwich   ----Dinner: fish   -----Snacks: no   -----Drinks:  1-2 beers/ day   · Exercise:  none   Steroids:none   · DM related hospitalizations:     Smoking: no  Family history of coronary artery disease/stroke: no     Complications of DM:  · CAD: no  · CVA: no  · PVD: no  · Amputations: no   Retinopathy no; last exam was in 2022, had cataract    · Gastropathy: no  · Nephropathy: no  · Neuropathy: no   Sees podiatrist:    Medications:  · Statin: atovastatin 20 mg daily   · ACE-I: lisinopril 5 mg   · ASA: no      · Diabetes education: no       Past Medical History:   Diagnosis Date    Arrhythmia     Hx of AFIB    Atrial fibrillation (HCC)     CAD (coronary artery disease)

## 2024-06-05 DIAGNOSIS — N52.8 OTHER MALE ERECTILE DYSFUNCTION: ICD-10-CM

## 2024-06-05 RX ORDER — ACYCLOVIR 400 MG/1
TABLET ORAL
Qty: 9 EACH | Refills: 3 | Status: SHIPPED | OUTPATIENT
Start: 2024-06-05

## 2024-06-06 RX ORDER — TADALAFIL 20 MG/1
TABLET ORAL
Qty: 30 TABLET | Refills: 0 | Status: SHIPPED | OUTPATIENT
Start: 2024-06-06

## 2024-06-07 ENCOUNTER — TELEPHONE (OUTPATIENT)
Age: 64
End: 2024-06-07

## 2024-06-07 RX ORDER — INSULIN GLARGINE 100 [IU]/ML
50 INJECTION, SOLUTION SUBCUTANEOUS NIGHTLY
Qty: 5 ADJUSTABLE DOSE PRE-FILLED PEN SYRINGE | Refills: 3 | Status: SHIPPED | OUTPATIENT
Start: 2024-06-07

## 2024-06-07 NOTE — TELEPHONE ENCOUNTER
Qasim L Anderson called because he said [facility] got a prescription for vials but he said he can't use those and would like for you to call in the pen needles instead to [facility].

## 2024-06-27 ENCOUNTER — HOSPITAL ENCOUNTER (OUTPATIENT)
Facility: HOSPITAL | Age: 64
Discharge: HOME | End: 2024-06-27
Payer: COMMERCIAL

## 2024-06-27 VITALS
SYSTOLIC BLOOD PRESSURE: 134 MMHG | DIASTOLIC BLOOD PRESSURE: 79 MMHG | HEART RATE: 119 BPM | TEMPERATURE: 97.7 F | RESPIRATION RATE: 16 BRPM | HEIGHT: 74 IN | OXYGEN SATURATION: 94 % | BODY MASS INDEX: 32.34 KG/M2 | WEIGHT: 252 LBS

## 2024-06-27 LAB
ALBUMIN SERPL-MCNC: 4.2 G/DL (ref 3.5–5)
ALBUMIN/GLOB SERPL: 1.2 (ref 1.1–2.2)
ALP SERPL-CCNC: 84 U/L (ref 45–117)
ALT SERPL-CCNC: 39 U/L (ref 12–78)
ANION GAP SERPL CALC-SCNC: 10 MMOL/L (ref 5–15)
APTT PPP: 25.6 SEC (ref 21.2–34.1)
AST SERPL W P-5'-P-CCNC: 21 U/L (ref 15–37)
BILIRUB SERPL-MCNC: 0.6 MG/DL (ref 0.2–1)
BUN SERPL-MCNC: 11 MG/DL (ref 6–20)
BUN/CREAT SERPL: 11 (ref 12–20)
CA-I BLD-MCNC: 10.1 MG/DL (ref 8.5–10.1)
CHLORIDE SERPL-SCNC: 106 MMOL/L (ref 97–108)
CO2 SERPL-SCNC: 23 MMOL/L (ref 21–32)
CREAT SERPL-MCNC: 0.96 MG/DL (ref 0.7–1.3)
ERYTHROCYTE [DISTWIDTH] IN BLOOD BY AUTOMATED COUNT: 12.5 % (ref 11.5–14.5)
GLOBULIN SER CALC-MCNC: 3.5 G/DL (ref 2–4)
GLUCOSE SERPL-MCNC: 199 MG/DL (ref 65–100)
HCT VFR BLD AUTO: 49.6 % (ref 36.6–50.3)
HGB BLD-MCNC: 17 G/DL (ref 12.1–17)
INR PPP: 0.9 (ref 0.9–1.1)
MCH RBC QN AUTO: 28.4 PG (ref 26–34)
MCHC RBC AUTO-ENTMCNC: 34.3 G/DL (ref 30–36.5)
MCV RBC AUTO: 82.9 FL (ref 80–99)
NRBC # BLD: 0 K/UL (ref 0–0.01)
NRBC BLD-RTO: 0 PER 100 WBC
PLATELET # BLD AUTO: 246 K/UL (ref 150–400)
PMV BLD AUTO: 11.1 FL (ref 8.9–12.9)
POTASSIUM SERPL-SCNC: 3.7 MMOL/L (ref 3.5–5.1)
PROT SERPL-MCNC: 7.7 G/DL (ref 6.4–8.2)
PROTHROMBIN TIME: 12 SEC (ref 11.9–14.6)
RBC # BLD AUTO: 5.98 M/UL (ref 4.1–5.7)
SODIUM SERPL-SCNC: 139 MMOL/L (ref 136–145)
THERAPEUTIC RANGE: NORMAL SEC (ref 82–109)
WBC # BLD AUTO: 6.8 K/UL (ref 4.1–11.1)

## 2024-06-27 PROCEDURE — 85610 PROTHROMBIN TIME: CPT

## 2024-06-27 PROCEDURE — 36415 COLL VENOUS BLD VENIPUNCTURE: CPT

## 2024-06-27 PROCEDURE — 85730 THROMBOPLASTIN TIME PARTIAL: CPT

## 2024-06-27 PROCEDURE — 85027 COMPLETE CBC AUTOMATED: CPT

## 2024-06-27 PROCEDURE — 80053 COMPREHEN METABOLIC PANEL: CPT

## 2024-06-27 RX ORDER — ISOSORBIDE MONONITRATE 30 MG/1
30 TABLET, EXTENDED RELEASE ORAL DAILY
COMMUNITY

## 2024-06-27 RX ORDER — PANTOPRAZOLE SODIUM 40 MG/1
40 FOR SUSPENSION ORAL
COMMUNITY

## 2024-06-27 RX ORDER — GLIPIZIDE 5 MG/1
5 TABLET, FILM COATED, EXTENDED RELEASE ORAL DAILY
COMMUNITY

## 2024-06-27 RX ORDER — NAPROXEN 500 MG/1
500 TABLET ORAL 2 TIMES DAILY WITH MEALS
COMMUNITY

## 2024-06-28 ENCOUNTER — TELEPHONE (OUTPATIENT)
Age: 64
End: 2024-06-28

## 2024-06-28 DIAGNOSIS — N52.8 OTHER MALE ERECTILE DYSFUNCTION: ICD-10-CM

## 2024-06-28 NOTE — TELEPHONE ENCOUNTER
I am getting a warning that he is taking isosorbide, or Imdur.  This is listed on his med list, but is not looking like an active prescription.      Can you give him a call and verify if he is or is not on Imdur.  If he is taking that, Cialis is contraindicated.

## 2024-06-28 NOTE — TELEPHONE ENCOUNTER
Advised pt due to interaction, we can not send any cialis in and he should stop taking immediately. Advise pt to be in contact with cardiologist regarding this issue. Pt expressed understanding.

## 2024-06-28 NOTE — TELEPHONE ENCOUNTER
Pt called asking for refill of cialis as he is about to be out. It was sent in on 6/6. Would it be able to be sent in before the 30 days? Please advise.

## 2024-07-01 ENCOUNTER — HOSPITAL ENCOUNTER (OUTPATIENT)
Facility: HOSPITAL | Age: 64
Discharge: HOME | End: 2024-07-02
Attending: INTERNAL MEDICINE | Admitting: INTERNAL MEDICINE
Payer: COMMERCIAL

## 2024-07-01 DIAGNOSIS — R07.9 CHEST PAIN, UNSPECIFIED TYPE: ICD-10-CM

## 2024-07-01 LAB
GLUCOSE BLD STRIP.AUTO-MCNC: 198 MG/DL (ref 65–100)
GLUCOSE BLD STRIP.AUTO-MCNC: 226 MG/DL (ref 65–100)
GLUCOSE BLD STRIP.AUTO-MCNC: 264 MG/DL (ref 65–100)
GLUCOSE BLD STRIP.AUTO-MCNC: 264 MG/DL (ref 65–100)
PERFORMED BY:: ABNORMAL

## 2024-07-01 PROCEDURE — C1887 CATHETER, GUIDING: HCPCS | Performed by: INTERNAL MEDICINE

## 2024-07-01 PROCEDURE — 2580000003 HC RX 258: Performed by: INTERNAL MEDICINE

## 2024-07-01 PROCEDURE — C1769 GUIDE WIRE: HCPCS | Performed by: INTERNAL MEDICINE

## 2024-07-01 PROCEDURE — 6370000000 HC RX 637 (ALT 250 FOR IP): Performed by: INTERNAL MEDICINE

## 2024-07-01 PROCEDURE — C1874 STENT, COATED/COV W/DEL SYS: HCPCS | Performed by: INTERNAL MEDICINE

## 2024-07-01 PROCEDURE — 6360000004 HC RX CONTRAST MEDICATION: Performed by: INTERNAL MEDICINE

## 2024-07-01 PROCEDURE — C1894 INTRO/SHEATH, NON-LASER: HCPCS | Performed by: INTERNAL MEDICINE

## 2024-07-01 PROCEDURE — 7100000001 HC PACU RECOVERY - ADDTL 15 MIN: Performed by: INTERNAL MEDICINE

## 2024-07-01 PROCEDURE — 2709999900 HC NON-CHARGEABLE SUPPLY: Performed by: INTERNAL MEDICINE

## 2024-07-01 PROCEDURE — 6360000002 HC RX W HCPCS: Performed by: INTERNAL MEDICINE

## 2024-07-01 PROCEDURE — C9600 PERC DRUG-EL COR STENT SING: HCPCS | Performed by: INTERNAL MEDICINE

## 2024-07-01 PROCEDURE — 99152 MOD SED SAME PHYS/QHP 5/>YRS: CPT | Performed by: INTERNAL MEDICINE

## 2024-07-01 PROCEDURE — 7100000000 HC PACU RECOVERY - FIRST 15 MIN: Performed by: INTERNAL MEDICINE

## 2024-07-01 PROCEDURE — 93458 L HRT ARTERY/VENTRICLE ANGIO: CPT | Performed by: INTERNAL MEDICINE

## 2024-07-01 PROCEDURE — 93571 IV DOP VEL&/PRESS C FLO 1ST: CPT | Performed by: INTERNAL MEDICINE

## 2024-07-01 PROCEDURE — 2500000003 HC RX 250 WO HCPCS: Performed by: INTERNAL MEDICINE

## 2024-07-01 PROCEDURE — C1760 CLOSURE DEV, VASC: HCPCS | Performed by: INTERNAL MEDICINE

## 2024-07-01 PROCEDURE — 93005 ELECTROCARDIOGRAM TRACING: CPT | Performed by: INTERNAL MEDICINE

## 2024-07-01 PROCEDURE — 82962 GLUCOSE BLOOD TEST: CPT

## 2024-07-01 PROCEDURE — 99153 MOD SED SAME PHYS/QHP EA: CPT | Performed by: INTERNAL MEDICINE

## 2024-07-01 PROCEDURE — 85347 COAGULATION TIME ACTIVATED: CPT

## 2024-07-01 DEVICE — STENT ONYXNG25012UX ONYX 2.50X12RX
Type: IMPLANTABLE DEVICE | Status: FUNCTIONAL
Brand: ONYX FRONTIER™

## 2024-07-01 RX ORDER — HEPARIN SODIUM 1000 [USP'U]/ML
INJECTION, SOLUTION INTRAVENOUS; SUBCUTANEOUS PRN
Status: DISCONTINUED | OUTPATIENT
Start: 2024-07-01 | End: 2024-07-01 | Stop reason: HOSPADM

## 2024-07-01 RX ORDER — SODIUM CHLORIDE 0.9 % (FLUSH) 0.9 %
5-40 SYRINGE (ML) INJECTION PRN
Status: DISCONTINUED | OUTPATIENT
Start: 2024-07-01 | End: 2024-07-02 | Stop reason: HOSPADM

## 2024-07-01 RX ORDER — ASPIRIN 81 MG/1
TABLET, CHEWABLE ORAL PRN
Status: DISCONTINUED | OUTPATIENT
Start: 2024-07-01 | End: 2024-07-01 | Stop reason: HOSPADM

## 2024-07-01 RX ORDER — INSULIN LISPRO 100 [IU]/ML
14 INJECTION, SOLUTION INTRAVENOUS; SUBCUTANEOUS
Status: DISCONTINUED | OUTPATIENT
Start: 2024-07-01 | End: 2024-07-02 | Stop reason: HOSPADM

## 2024-07-01 RX ORDER — MIDAZOLAM HYDROCHLORIDE 1 MG/ML
INJECTION INTRAMUSCULAR; INTRAVENOUS PRN
Status: DISCONTINUED | OUTPATIENT
Start: 2024-07-01 | End: 2024-07-01 | Stop reason: HOSPADM

## 2024-07-01 RX ORDER — DIPHENHYDRAMINE HYDROCHLORIDE 50 MG/ML
INJECTION INTRAMUSCULAR; INTRAVENOUS PRN
Status: DISCONTINUED | OUTPATIENT
Start: 2024-07-01 | End: 2024-07-01 | Stop reason: HOSPADM

## 2024-07-01 RX ORDER — 0.9 % SODIUM CHLORIDE 0.9 %
INTRAVENOUS SOLUTION INTRAVENOUS CONTINUOUS PRN
Status: COMPLETED | OUTPATIENT
Start: 2024-07-01 | End: 2024-07-01

## 2024-07-01 RX ORDER — SODIUM CHLORIDE 0.9 % (FLUSH) 0.9 %
5-40 SYRINGE (ML) INJECTION EVERY 12 HOURS SCHEDULED
Status: DISCONTINUED | OUTPATIENT
Start: 2024-07-01 | End: 2024-07-02 | Stop reason: HOSPADM

## 2024-07-01 RX ORDER — FENTANYL CITRATE 50 UG/ML
INJECTION, SOLUTION INTRAMUSCULAR; INTRAVENOUS PRN
Status: DISCONTINUED | OUTPATIENT
Start: 2024-07-01 | End: 2024-07-01 | Stop reason: HOSPADM

## 2024-07-01 RX ORDER — LISINOPRIL 5 MG/1
5 TABLET ORAL DAILY
Status: DISCONTINUED | OUTPATIENT
Start: 2024-07-01 | End: 2024-07-02 | Stop reason: HOSPADM

## 2024-07-01 RX ORDER — LIDOCAINE HYDROCHLORIDE 10 MG/ML
INJECTION, SOLUTION INFILTRATION; PERINEURAL PRN
Status: DISCONTINUED | OUTPATIENT
Start: 2024-07-01 | End: 2024-07-01 | Stop reason: HOSPADM

## 2024-07-01 RX ORDER — SODIUM CHLORIDE 9 MG/ML
INJECTION, SOLUTION INTRAVENOUS PRN
Status: DISCONTINUED | OUTPATIENT
Start: 2024-07-01 | End: 2024-07-02 | Stop reason: HOSPADM

## 2024-07-01 RX ORDER — SODIUM CHLORIDE 9 MG/ML
INJECTION, SOLUTION INTRAVENOUS CONTINUOUS
Status: DISCONTINUED | OUTPATIENT
Start: 2024-07-01 | End: 2024-07-02 | Stop reason: HOSPADM

## 2024-07-01 RX ORDER — INSULIN GLARGINE 100 [IU]/ML
52 INJECTION, SOLUTION SUBCUTANEOUS DAILY
Status: DISCONTINUED | OUTPATIENT
Start: 2024-07-01 | End: 2024-07-02 | Stop reason: HOSPADM

## 2024-07-01 RX ORDER — ACETAMINOPHEN 325 MG/1
650 TABLET ORAL EVERY 4 HOURS PRN
Status: DISCONTINUED | OUTPATIENT
Start: 2024-07-01 | End: 2024-07-02 | Stop reason: HOSPADM

## 2024-07-01 RX ORDER — ISOSORBIDE MONONITRATE 30 MG/1
30 TABLET, EXTENDED RELEASE ORAL DAILY
Status: DISCONTINUED | OUTPATIENT
Start: 2024-07-01 | End: 2024-07-02 | Stop reason: HOSPADM

## 2024-07-01 RX ORDER — ATORVASTATIN CALCIUM 40 MG/1
40 TABLET, FILM COATED ORAL NIGHTLY
Status: DISCONTINUED | OUTPATIENT
Start: 2024-07-01 | End: 2024-07-02

## 2024-07-01 RX ORDER — ASPIRIN 81 MG/1
81 TABLET, CHEWABLE ORAL DAILY
Status: DISCONTINUED | OUTPATIENT
Start: 2024-07-02 | End: 2024-07-02 | Stop reason: HOSPADM

## 2024-07-01 RX ORDER — ROSUVASTATIN CALCIUM 20 MG/1
40 TABLET, COATED ORAL NIGHTLY
Status: DISCONTINUED | OUTPATIENT
Start: 2024-07-01 | End: 2024-07-02 | Stop reason: HOSPADM

## 2024-07-01 RX ORDER — EMPAGLIFLOZIN 10 MG/1
10 TABLET, FILM COATED ORAL DAILY
Qty: 30 TABLET | Refills: 2 | Status: SHIPPED | OUTPATIENT
Start: 2024-07-01

## 2024-07-01 RX ADMIN — EMPAGLIFLOZIN 10 MG: 10 TABLET, FILM COATED ORAL at 17:24

## 2024-07-01 RX ADMIN — METOPROLOL TARTRATE 12.5 MG: 25 TABLET, FILM COATED ORAL at 17:24

## 2024-07-01 RX ADMIN — LISINOPRIL 5 MG: 5 TABLET ORAL at 17:24

## 2024-07-01 RX ADMIN — SODIUM CHLORIDE: 9 INJECTION, SOLUTION INTRAVENOUS at 07:39

## 2024-07-01 RX ADMIN — TICAGRELOR 90 MG: 90 TABLET ORAL at 17:33

## 2024-07-01 RX ADMIN — SODIUM CHLORIDE: 9 INJECTION, SOLUTION INTRAVENOUS at 22:48

## 2024-07-01 RX ADMIN — ACETAMINOPHEN 650 MG: 325 TABLET ORAL at 21:21

## 2024-07-01 RX ADMIN — ROSUVASTATIN CALCIUM 40 MG: 20 TABLET, COATED ORAL at 21:22

## 2024-07-01 RX ADMIN — METOPROLOL TARTRATE 12.5 MG: 25 TABLET, FILM COATED ORAL at 21:23

## 2024-07-01 RX ADMIN — SODIUM CHLORIDE, PRESERVATIVE FREE 10 ML: 5 INJECTION INTRAVENOUS at 16:34

## 2024-07-01 RX ADMIN — ISOSORBIDE MONONITRATE 30 MG: 30 TABLET, EXTENDED RELEASE ORAL at 17:24

## 2024-07-01 RX ADMIN — SODIUM CHLORIDE, PRESERVATIVE FREE 10 ML: 5 INJECTION INTRAVENOUS at 21:23

## 2024-07-01 RX ADMIN — INSULIN LISPRO 14 UNITS: 100 INJECTION, SOLUTION INTRAVENOUS; SUBCUTANEOUS at 17:24

## 2024-07-01 RX ADMIN — INSULIN GLARGINE 52 UNITS: 100 INJECTION, SOLUTION SUBCUTANEOUS at 21:22

## 2024-07-01 ASSESSMENT — PAIN DESCRIPTION - DESCRIPTORS: DESCRIPTORS: ACHING

## 2024-07-01 ASSESSMENT — PAIN SCALES - GENERAL
PAINLEVEL_OUTOF10: 0
PAINLEVEL_OUTOF10: 6
PAINLEVEL_OUTOF10: 0

## 2024-07-01 ASSESSMENT — PAIN - FUNCTIONAL ASSESSMENT: PAIN_FUNCTIONAL_ASSESSMENT: 0-10

## 2024-07-01 ASSESSMENT — PAIN DESCRIPTION - LOCATION: LOCATION: HEAD

## 2024-07-01 NOTE — DISCHARGE INSTRUCTIONS
IMPORTANT    People who undergo angioplasty and/or stent placement procedures are prescribed aspirin along with certain medications called anticlotting or antiplatelet medications. These medications include: Plavix (clopidogrel), Effient (prasugrel), and Brilinta (ticagrelor). It is important to take the aspirin and the anticlotting medication that you were prescribed every day in order to reduce the probability that the stent will become filled with blood clot. Angioplasty and/or stent placement procedures are done so that a blocked artery can be opened. If the stent becomes filled with blood clot, the artery becomes blocked off again. This can result in a heart attack, or even death.    It is extremely important to take all the medicines prescribed by your cardiologist exactly as they were prescribed. Failure to take certain medications - particularly aspirin along with Plavix (clopidogrel),  Effient (prasugrel), or Brilinta(ticagrelor) - can result in a heart attack, or even death. Do not miss any doses. It is vital that aspirin along with Plavix, Effient, or Brilinta be taken every single day. If you have any questions or concerns regarding your medications, please consult your cardiologist. He/she is the only person who can adjust or stop these medications.    You must fill the prescription for these medications immediately upon discharge so that you do not miss any doses. If you cannot afford the medication prescribed to you, please let your cardiologist know immediately.  _________________________________________________________________________________________________

## 2024-07-01 NOTE — PERIOP NOTE
Family update done   [facility] family pt is waiting for a bed in facility  Unknown ETA on bed assignment   Family verbalized understanding

## 2024-07-02 VITALS
DIASTOLIC BLOOD PRESSURE: 70 MMHG | TEMPERATURE: 97.7 F | HEIGHT: 74 IN | WEIGHT: 252.87 LBS | BODY MASS INDEX: 32.45 KG/M2 | HEART RATE: 91 BPM | OXYGEN SATURATION: 94 % | RESPIRATION RATE: 18 BRPM | SYSTOLIC BLOOD PRESSURE: 125 MMHG

## 2024-07-02 PROBLEM — E11.59 CORONARY ARTERY DISEASE DUE TO TYPE 2 DIABETES MELLITUS (HCC): Status: ACTIVE | Noted: 2024-07-02

## 2024-07-02 PROBLEM — I25.10 CORONARY ARTERY DISEASE DUE TO TYPE 2 DIABETES MELLITUS (HCC): Status: ACTIVE | Noted: 2024-07-02

## 2024-07-02 LAB
ANION GAP SERPL CALC-SCNC: 8 MMOL/L (ref 5–15)
BUN SERPL-MCNC: 9 MG/DL (ref 6–20)
BUN/CREAT SERPL: 11 (ref 12–20)
CA-I BLD-MCNC: 9.5 MG/DL (ref 8.5–10.1)
CHLORIDE SERPL-SCNC: 107 MMOL/L (ref 97–108)
CHOLEST SERPL-MCNC: 191 MG/DL
CO2 SERPL-SCNC: 24 MMOL/L (ref 21–32)
CREAT SERPL-MCNC: 0.81 MG/DL (ref 0.7–1.3)
EKG ATRIAL RATE: 85 BPM
EKG ATRIAL RATE: 91 BPM
EKG DIAGNOSIS: NORMAL
EKG DIAGNOSIS: NORMAL
EKG P AXIS: 76 DEGREES
EKG P AXIS: 80 DEGREES
EKG P-R INTERVAL: 166 MS
EKG P-R INTERVAL: 174 MS
EKG Q-T INTERVAL: 380 MS
EKG Q-T INTERVAL: 382 MS
EKG QRS DURATION: 84 MS
EKG QRS DURATION: 94 MS
EKG QTC CALCULATION (BAZETT): 452 MS
EKG QTC CALCULATION (BAZETT): 469 MS
EKG R AXIS: 19 DEGREES
EKG R AXIS: 21 DEGREES
EKG T AXIS: 45 DEGREES
EKG T AXIS: 75 DEGREES
EKG VENTRICULAR RATE: 85 BPM
EKG VENTRICULAR RATE: 91 BPM
ERYTHROCYTE [DISTWIDTH] IN BLOOD BY AUTOMATED COUNT: 12.8 % (ref 11.5–14.5)
GLUCOSE BLD STRIP.AUTO-MCNC: 227 MG/DL (ref 65–100)
GLUCOSE SERPL-MCNC: 191 MG/DL (ref 65–100)
HCT VFR BLD AUTO: 43.2 % (ref 36.6–50.3)
HDLC SERPL-MCNC: 39 MG/DL
HDLC SERPL: 4.9 (ref 0–5)
HGB BLD-MCNC: 14.9 G/DL (ref 12.1–17)
LDLC SERPL CALC-MCNC: 78.2 MG/DL (ref 0–100)
LIPID PANEL: ABNORMAL
MCH RBC QN AUTO: 28.5 PG (ref 26–34)
MCHC RBC AUTO-ENTMCNC: 34.5 G/DL (ref 30–36.5)
MCV RBC AUTO: 82.8 FL (ref 80–99)
NRBC # BLD: 0 K/UL (ref 0–0.01)
NRBC BLD-RTO: 0 PER 100 WBC
PERFORMED BY:: ABNORMAL
PLATELET # BLD AUTO: 212 K/UL (ref 150–400)
PMV BLD AUTO: 11.2 FL (ref 8.9–12.9)
POTASSIUM SERPL-SCNC: 3.8 MMOL/L (ref 3.5–5.1)
RBC # BLD AUTO: 5.22 M/UL (ref 4.1–5.7)
SODIUM SERPL-SCNC: 139 MMOL/L (ref 136–145)
TRIGL SERPL-MCNC: 369 MG/DL
TROPONIN I SERPL HS-MCNC: 6 NG/L (ref 0–76)
VLDLC SERPL CALC-MCNC: 73.8 MG/DL
WBC # BLD AUTO: 6.7 K/UL (ref 4.1–11.1)

## 2024-07-02 PROCEDURE — 2580000003 HC RX 258: Performed by: INTERNAL MEDICINE

## 2024-07-02 PROCEDURE — 36415 COLL VENOUS BLD VENIPUNCTURE: CPT

## 2024-07-02 PROCEDURE — 93005 ELECTROCARDIOGRAM TRACING: CPT | Performed by: INTERNAL MEDICINE

## 2024-07-02 PROCEDURE — 82962 GLUCOSE BLOOD TEST: CPT

## 2024-07-02 PROCEDURE — 85027 COMPLETE CBC AUTOMATED: CPT

## 2024-07-02 PROCEDURE — 6370000000 HC RX 637 (ALT 250 FOR IP): Performed by: INTERNAL MEDICINE

## 2024-07-02 PROCEDURE — 84484 ASSAY OF TROPONIN QUANT: CPT

## 2024-07-02 PROCEDURE — 80061 LIPID PANEL: CPT

## 2024-07-02 PROCEDURE — 80048 BASIC METABOLIC PNL TOTAL CA: CPT

## 2024-07-02 RX ORDER — ROSUVASTATIN CALCIUM 40 MG/1
40 TABLET, COATED ORAL NIGHTLY
Qty: 30 TABLET | Refills: 3 | Status: SHIPPED | OUTPATIENT
Start: 2024-07-02

## 2024-07-02 RX ADMIN — INSULIN LISPRO 14 UNITS: 100 INJECTION, SOLUTION INTRAVENOUS; SUBCUTANEOUS at 08:20

## 2024-07-02 RX ADMIN — SODIUM CHLORIDE, PRESERVATIVE FREE 10 ML: 5 INJECTION INTRAVENOUS at 08:20

## 2024-07-02 RX ADMIN — ASPIRIN 81 MG 81 MG: 81 TABLET ORAL at 08:19

## 2024-07-02 RX ADMIN — TICAGRELOR 90 MG: 90 TABLET ORAL at 08:19

## 2024-07-02 RX ADMIN — METOPROLOL TARTRATE 12.5 MG: 25 TABLET, FILM COATED ORAL at 08:19

## 2024-07-02 RX ADMIN — LISINOPRIL 5 MG: 5 TABLET ORAL at 08:19

## 2024-07-02 RX ADMIN — ISOSORBIDE MONONITRATE 30 MG: 30 TABLET, EXTENDED RELEASE ORAL at 08:19

## 2024-07-02 RX ADMIN — EMPAGLIFLOZIN 10 MG: 10 TABLET, FILM COATED ORAL at 08:19

## 2024-07-02 NOTE — PLAN OF CARE
Problem: Safety - Adult  Goal: Free from fall injury  7/2/2024 0202 by Verenice Sanders RN  Outcome: Progressing     Problem: ABCDS Injury Assessment  Goal: Absence of physical injury  7/2/2024 0202 by Verenice Sanders, RN  Outcome: Progressing

## 2024-07-02 NOTE — CARE COORDINATION
Transition of Care Plan:    RUR: n/a due to outpatient  Prior Level of Functioning: independent  Disposition: home self  If SNF or IPR: Date FOC offered:   Date FOC received:   Accepting facility:   Date authorization started with reference number:   Date authorization received and expires:   Follow up appointments: yes  DME needed: n/a  Transportation at discharge: patient arranged  IM/IMM Medicare/ letter given: n/a  Is patient a  and connected with [facility]?    If yes, was [place] transfer form completed and [facility] notified?   Caregiver Contact:   Discharge Caregiver contacted prior to discharge? Patient aware  Care Conference needed?   Barriers to discharge: n/a

## 2024-07-02 NOTE — PROGRESS NOTES
Called pharmacy at [phone] for to verify pt's meds so I can give them to the pt. Pharmacy stated they will verify.  
Information about the importance of aspirin and antiplatelet compliance and outpatient cardiac rehab will be included with the patient's printed discharge instructions. Patient was also provided with a folder containing this information from the cath lab staff after the procedure.     No outpatient cardiac rehab referral made per Dr. MICHEAL Waddell's instructions.  
VSS. Patient given discharge instructions. Medications and follow-up appointments reviewed. IV and Telemetry removed. Case management, provider, and primary nurse aware of discharge. Discharge plan of care/case management plan validated with provider discharge order.   
received a stent with angiographically excellent results.  Diabetes mellitus type 2.  Hyperlipidemia.  Hypertension.       Assessment/Plan:   Discussed at length about aspirin and Brilinta that he is on dual antiplatelet medications.  Advised him not to interrupt dual antiplatelet medications.  Will discharge patient home and will be followed by his primary cardiologist Dr. Johnson.  Thank you.           [x]       High complexity decision making was performed in this patient at high risk for decompensation with multiple organ involvement.    Barber Waddell MD

## 2024-07-05 LAB
ACT BLD: 220 SEC (ref 74–125)
PERFORMED BY:: ABNORMAL

## 2024-07-08 NOTE — TELEPHONE ENCOUNTER
[facility] care pharmacy was following up with refill requests sent a week ago for dexcom g7 receivers and semglee 100 unit pens   The call back is [phone] if needed

## 2024-07-09 RX ORDER — ACYCLOVIR 400 MG/1
TABLET ORAL
Qty: 9 EACH | Refills: 3 | Status: SHIPPED | OUTPATIENT
Start: 2024-07-09

## 2024-07-09 RX ORDER — FLURBIPROFEN SODIUM 0.3 MG/ML
SOLUTION/ DROPS OPHTHALMIC
Qty: 100 EACH | Refills: 1 | Status: SHIPPED | OUTPATIENT
Start: 2024-07-09

## 2024-07-16 ENCOUNTER — HOSPITAL ENCOUNTER (EMERGENCY)
Facility: HOSPITAL | Age: 64
Discharge: HOME | End: 2024-07-16
Payer: COMMERCIAL

## 2024-07-16 ENCOUNTER — APPOINTMENT (OUTPATIENT)
Facility: HOSPITAL | Age: 64
End: 2024-07-16
Payer: COMMERCIAL

## 2024-07-16 VITALS
DIASTOLIC BLOOD PRESSURE: 75 MMHG | HEART RATE: 101 BPM | TEMPERATURE: 97.9 F | WEIGHT: 249 LBS | OXYGEN SATURATION: 100 % | SYSTOLIC BLOOD PRESSURE: 110 MMHG | RESPIRATION RATE: 17 BRPM | HEIGHT: 74 IN | BODY MASS INDEX: 31.95 KG/M2

## 2024-07-16 DIAGNOSIS — K59.01 SLOW TRANSIT CONSTIPATION: Primary | ICD-10-CM

## 2024-07-16 DIAGNOSIS — K56.41 FECAL IMPACTION IN RECTUM (HCC): ICD-10-CM

## 2024-07-16 PROCEDURE — 74022 RADEX COMPL AQT ABD SERIES: CPT

## 2024-07-16 PROCEDURE — 6370000000 HC RX 637 (ALT 250 FOR IP): Performed by: PHYSICIAN ASSISTANT

## 2024-07-16 PROCEDURE — 99283 EMERGENCY DEPT VISIT LOW MDM: CPT

## 2024-07-16 RX ORDER — METOCLOPRAMIDE 10 MG/1
10 TABLET ORAL 4 TIMES DAILY
Qty: 120 TABLET | Refills: 0 | Status: SHIPPED | OUTPATIENT
Start: 2024-07-16

## 2024-07-16 RX ORDER — ENEMA 19; 7 G/133ML; G/133ML
1 ENEMA RECTAL
Status: COMPLETED | OUTPATIENT
Start: 2024-07-16 | End: 2024-07-16

## 2024-07-16 RX ORDER — ENEMA 19; 7 G/133ML; G/133ML
1 ENEMA RECTAL
Qty: 133 ML | Refills: 0 | Status: SHIPPED | OUTPATIENT
Start: 2024-07-16 | End: 2024-07-16

## 2024-07-16 RX ADMIN — SALINE LAXATIVE 1 ENEMA: 7; 19 ENEMA RECTAL at 09:26

## 2024-07-16 RX ADMIN — SODIUM PHOSPHATE 1 ENEMA: 7; 19 ENEMA RECTAL at 10:06

## 2024-07-16 ASSESSMENT — PAIN - FUNCTIONAL ASSESSMENT
PAIN_FUNCTIONAL_ASSESSMENT: NONE - DENIES PAIN
PAIN_FUNCTIONAL_ASSESSMENT: 0-10

## 2024-07-16 ASSESSMENT — PAIN SCALES - GENERAL: PAINLEVEL_OUTOF10: 2

## 2024-07-16 ASSESSMENT — LIFESTYLE VARIABLES
HOW OFTEN DO YOU HAVE A DRINK CONTAINING ALCOHOL: 2-4 TIMES A MONTH
HOW MANY STANDARD DRINKS CONTAINING ALCOHOL DO YOU HAVE ON A TYPICAL DAY: 3 OR 4

## 2024-07-16 NOTE — ED PROVIDER NOTES
[facility] EMERGENCY DEPT  EMERGENCY DEPARTMENT HISTORY AND PHYSICAL EXAM      Date: 7/16/2024  Patient Name: Qasim Soler  MRN: [id number]  YOB: 1960  Date of evaluation: 7/16/2024  Provider: Ashok Rolon PA-C   Note Started: 8:51 AM EDT 7/16/24    HISTORY OF PRESENT ILLNESS     Chief Complaint   Patient presents with    Constipation       History Provided By: Patient    HPI: Qasim Soler is a 63 y.o. male with past history of coronary artery disease with stent placement last week, diabetes, GERD, hypertension with complaint of constipation for 2 weeks.  Patient states his last normal bowel movement was 2 weeks ago.  He denies any abdominal pain, denies fevers, chills, nausea, vomiting.  States he just feels pressure in his abdomen associated with this.  He has tried multiple over-the-counter laxatives as well as stool softeners with no improvement in his symptoms.  He states he drinks up to 7-8 bottles of water per day.  Past medical history as below reviewed by me with patient.  Current medications reviewed by me with patient.    PAST MEDICAL HISTORY   Past Medical History:  Past Medical History:   Diagnosis Date    Arrhythmia     Hx of AFIB    Atrial fibrillation (HCC)     CAD (coronary artery disease)     Diabetes (HCC)     Diverticulosis     Dysphagia     GERD (gastroesophageal reflux disease)     Hx of hemorrhoids     Hypercholesterolemia     Hypertension     Sciatica        Past Surgical History:  Past Surgical History:   Procedure Laterality Date    CARDIAC PROCEDURE N/A 7/1/2024    Left heart cath / coronary angiography performed by Barber Waddell MD at [facility] CARDIAC CATH LAB    CARDIAC PROCEDURE N/A 7/1/2024    Instantaneous wave-free ratio (iFR) performed by Barber Waddell MD at [facility] CARDIAC CATH LAB    CARDIAC PROCEDURE N/A 7/1/2024    Insert stent angelica coronary performed by Barber Waddell MD at [facility] CARDIAC CATH LAB    CARDIAC PROCEDURE N/A 7/1/2024    Percutaneous coronary

## 2024-07-16 NOTE — ED TRIAGE NOTES
Patient here with c/o \" I haven't gone to the bathroom in 2 weeks. States has been taking stool softeners and \" some laxative pill my wife gave me\". No c/o abd pain.

## 2024-07-22 ENCOUNTER — TELEPHONE (OUTPATIENT)
Age: 64
End: 2024-07-22

## 2024-07-22 DIAGNOSIS — Z74.09 IMMOBILITY: Primary | ICD-10-CM

## 2024-07-22 NOTE — TELEPHONE ENCOUNTER
Patient called today, and is requesting orders for a knee scooter, He can be reached @ [phone]

## 2024-07-25 DIAGNOSIS — E11.42 TYPE 2 DIABETES MELLITUS WITH DIABETIC POLYNEUROPATHY, WITHOUT LONG-TERM CURRENT USE OF INSULIN (HCC): ICD-10-CM

## 2024-07-26 RX ORDER — PREGABALIN 75 MG/1
CAPSULE ORAL
Refills: 0 | OUTPATIENT
Start: 2024-07-26

## 2024-08-01 ENCOUNTER — HOSPITAL ENCOUNTER (EMERGENCY)
Facility: HOSPITAL | Age: 64
Discharge: HOME | End: 2024-08-01
Attending: STUDENT IN AN ORGANIZED HEALTH CARE EDUCATION/TRAINING PROGRAM
Payer: MEDICARE

## 2024-08-01 VITALS
TEMPERATURE: 97.1 F | RESPIRATION RATE: 18 BRPM | WEIGHT: 315 LBS | SYSTOLIC BLOOD PRESSURE: 139 MMHG | BODY MASS INDEX: 40.43 KG/M2 | OXYGEN SATURATION: 95 % | HEART RATE: 83 BPM | DIASTOLIC BLOOD PRESSURE: 77 MMHG | HEIGHT: 74 IN

## 2024-08-01 DIAGNOSIS — K91.840 SURGICAL WOUND HEMORRHAGE AFTER DENTAL PROCEDURE: Primary | ICD-10-CM

## 2024-08-01 DIAGNOSIS — Z98.818 SURGICAL WOUND HEMORRHAGE AFTER DENTAL PROCEDURE: Primary | ICD-10-CM

## 2024-08-01 PROCEDURE — 99282 EMERGENCY DEPT VISIT SF MDM: CPT

## 2024-08-01 ASSESSMENT — LIFESTYLE VARIABLES
HOW MANY STANDARD DRINKS CONTAINING ALCOHOL DO YOU HAVE ON A TYPICAL DAY: PATIENT DOES NOT DRINK
HOW OFTEN DO YOU HAVE A DRINK CONTAINING ALCOHOL: NEVER

## 2024-08-01 ASSESSMENT — PAIN SCALES - GENERAL
PAINLEVEL_OUTOF10: 6
PAINLEVEL_OUTOF10: 6

## 2024-08-01 NOTE — DISCHARGE INSTRUCTIONS
Thank you!    Thank you for allowing me to care for you in the emergency department.  I sincerely hope that you are satisfied with your visit today.  It is my goal to provide you with excellent care.    Below you will find a list of your labs and imaging from your visit today if applicable. Should you have any questions regarding these results please do not hesitate to call the emergency department. Please review [facility] for a more detailed result list since the below list may not be comprehensive. Instructions on how to sign up to [facility] should be provided in this packet.    Labs -   No results found for this or any previous visit (from the past 12 hour(s)).    Radiologic Studies -   No orders to display     [unfilled]  [unfilled]       If you feel that you have not received excellent quality care or timely care, please ask to speak to the nurse manager. Please choose us in the future for your continued health care needs.   ------------------------------------------------------------------------------------------------------------  The exam and treatment you received in the Emergency Department were for an urgent problem and are not intended as complete care. It is very important that you follow-up with a doctor, nurse practitioner, or physician assistant in a timely manner to:  (1) confirm your diagnosis and review all imaging and lab results,  (2) re-evaluation of changes in your illness and treatment, and  (3) for ongoing care.  If your symptoms become worse or you do not improve as expected and you are unable to reach your usual health care provider, you should return to the Emergency Department. We are available 24 hours a day.     Please take your discharge instructions with you when you go to your follow-up appointment.     If a prescription has been provided, please have it filled as soon as possible to prevent a delay in treatment. Read the entire medication instruction sheet provided to you by the pharmacy. If

## 2024-08-01 NOTE — ED TRIAGE NOTES
Pt reports bleeding from dental surgery today. No signs of respiratory distress noted. Pt takes Brilinta

## 2024-08-03 NOTE — ED PROVIDER NOTES
EMERGENCY DEPARTMENT HISTORY AND PHYSICAL EXAM      Date: 8/1/2024  Patient Name: Qasim Soler  MRN: [id number]  YOB: 1960  Date of evaluation: 8/1/2024  Provider: Mack Arboleda MD     History of Present Illness     Chief Complaint   Patient presents with    Dental Problem    Bleeding/Bruising       History Provided By: Patient    HPI: Qasim Soler, 63 y.o. male with past medical history as listed and reviewed below presenting to the ED for evaluation of mouth bleeding. Patient had multiple teeth removed today from upper R and lower L. Having slow bleeding constant from both, not on any AC.     Medical History     Past Medical History:  Past Medical History:   Diagnosis Date    Arrhythmia     Hx of AFIB    Atrial fibrillation (HCC)     CAD (coronary artery disease)     Diabetes (HCC)     Diverticulosis     Dysphagia     GERD (gastroesophageal reflux disease)     Hx of hemorrhoids     Hypercholesterolemia     Hypertension     Sciatica        Past Surgical History:  Past Surgical History:   Procedure Laterality Date    CARDIAC PROCEDURE N/A 7/1/2024    Left heart cath / coronary angiography performed by Barber Waddell MD at [facility] CARDIAC CATH LAB    CARDIAC PROCEDURE N/A 7/1/2024    Instantaneous wave-free ratio (iFR) performed by Barber Waddell MD at [facility] CARDIAC CATH LAB    CARDIAC PROCEDURE N/A 7/1/2024    Insert stent angelica coronary performed by Barber Waddell MD at [facility] CARDIAC CATH LAB    CARDIAC PROCEDURE N/A 7/1/2024    Percutaneous coronary intervention performed by Barber Waddell MD at [facility] CARDIAC CATH LAB    COLONOSCOPY      ORTHOPEDIC SURGERY Left     big toe amputated    ORTHOPEDIC SURGERY Left     wrist     AK UNLISTED PROCEDURE CARDIAC SURGERY      Atrial fibrilation     SHOULDER ARTHROSCOPY Right 06/09/2021       Family History:  Family History   Problem Relation Age of Onset    Cancer Father        Social History:  Social History     Tobacco Use    Smoking status: Former

## 2024-11-04 ENCOUNTER — TELEPHONE (OUTPATIENT)
Age: 64
End: 2024-11-04

## 2024-11-04 NOTE — TELEPHONE ENCOUNTER
Pt Called Stating He Had A Stent Placed W/ Dr. Milton Tomlin Back In February And The Doc Took Him Off tadalafil (CIALIS) 20 MG tablet Because It Was A Counteract. Now He Can Start Back Taking The Med Per The Doc. He Want To Know Can He Get A Refill And He Would Like It Sent To [facility] On [address].

## 2024-11-04 NOTE — TELEPHONE ENCOUNTER
Imdur is still on his medication list.  Let's schedule him a VV (or in person visit) with Dr. Laughlin to discuss restarting the medication.    We can also update his med list that way.

## 2025-01-14 PROBLEM — R79.89 LOW TESTOSTERONE IN MALE: Chronic | Status: ACTIVE | Noted: 2021-01-11

## 2025-01-17 DIAGNOSIS — N52.8 OTHER MALE ERECTILE DYSFUNCTION: ICD-10-CM

## 2025-01-17 RX ORDER — TADALAFIL 20 MG/1
20 TABLET ORAL PRN
Qty: 30 TABLET | Refills: 0 | OUTPATIENT
Start: 2025-01-17

## 2025-01-17 NOTE — TELEPHONE ENCOUNTER
PCP: Cata Lewis MD    Last appt: 2024  Future Appointments   Date Time Provider Department Center   2025  8:00 AM González Laughlin MD [facility]  AMB       Requested Prescriptions     Pending Prescriptions Disp Refills    tadalafil (CIALIS) 20 MG tablet 30 tablet 0     Si tablet as needed for Erectile Dysfunction

## 2025-03-19 ENCOUNTER — OFFICE VISIT (OUTPATIENT)
Age: 65
End: 2025-03-19
Payer: MEDICARE

## 2025-03-19 VITALS
TEMPERATURE: 99 F | WEIGHT: 240.8 LBS | OXYGEN SATURATION: 97 % | RESPIRATION RATE: 20 BRPM | BODY MASS INDEX: 30.9 KG/M2 | HEIGHT: 74 IN | DIASTOLIC BLOOD PRESSURE: 85 MMHG | SYSTOLIC BLOOD PRESSURE: 129 MMHG | HEART RATE: 86 BPM

## 2025-03-19 DIAGNOSIS — E11.42 TYPE 2 DIABETES MELLITUS WITH DIABETIC POLYNEUROPATHY, WITHOUT LONG-TERM CURRENT USE OF INSULIN (HCC): Primary | ICD-10-CM

## 2025-03-19 DIAGNOSIS — E66.811 OBESITY (BMI 30.0-34.9): ICD-10-CM

## 2025-03-19 DIAGNOSIS — I10 PRIMARY HYPERTENSION: ICD-10-CM

## 2025-03-19 PROCEDURE — 99213 OFFICE O/P EST LOW 20 MIN: CPT | Performed by: STUDENT IN AN ORGANIZED HEALTH CARE EDUCATION/TRAINING PROGRAM

## 2025-03-19 PROCEDURE — G2211 COMPLEX E/M VISIT ADD ON: HCPCS | Performed by: STUDENT IN AN ORGANIZED HEALTH CARE EDUCATION/TRAINING PROGRAM

## 2025-03-19 PROCEDURE — 3079F DIAST BP 80-89 MM HG: CPT | Performed by: STUDENT IN AN ORGANIZED HEALTH CARE EDUCATION/TRAINING PROGRAM

## 2025-03-19 PROCEDURE — 3074F SYST BP LT 130 MM HG: CPT | Performed by: STUDENT IN AN ORGANIZED HEALTH CARE EDUCATION/TRAINING PROGRAM

## 2025-03-19 RX ORDER — EMPAGLIFLOZIN 25 MG/1
25 TABLET, FILM COATED ORAL DAILY
COMMUNITY
Start: 2025-02-24

## 2025-03-19 RX ORDER — CLOPIDOGREL BISULFATE 75 MG/1
75 TABLET ORAL DAILY
COMMUNITY
Start: 2024-10-15

## 2025-03-19 RX ORDER — CARVEDILOL 25 MG/1
25 TABLET ORAL 2 TIMES DAILY
COMMUNITY

## 2025-03-19 RX ORDER — INSULIN GLARGINE-YFGN 100 [IU]/ML
INJECTION, SOLUTION SUBCUTANEOUS
COMMUNITY
Start: 2025-02-08

## 2025-03-19 NOTE — PROGRESS NOTES
\"Have you been to the ER, urgent care clinic since your last visit?  Hospitalized since your last visit?\"    NO    “Have you seen or consulted any other health care providers outside our system since your last visit?”    NO    “Have you had a colorectal cancer screening such as a colonoscopy/FIT/Cologuard?    YES - Type: Colonoscopy - Where: pt is unsure Nurse/CMA to request most recent records if not in the chart     No colonoscopy on file  No cologuard on file  No FIT/FOBT on file   No flexible sigmoidoscopy on file     “Have you had a diabetic eye exam?”    YES - Where: [facility] Nurse/CMA to request most recent records if not in the chart     No diabetic eye exam on file     Chief Complaint   Patient presents with    Follow-up    Diabetes     /85 (BP Site: Left Upper Arm, Patient Position: Sitting, BP Cuff Size: Large Adult)   Pulse 86   Temp 99 °F (37.2 °C) (Temporal)   Resp 20   Ht 1.88 m (6' 2\")   Wt 109.2 kg (240 lb 12.8 oz)   SpO2 97%   BMI 30.92 kg/m²           
needles, strips, alcohol swabs     Will prescribe dexcom   Patient has a diagnosis of diabetes;   Patient is currently treated with >3  daily administrations of insulin   Patient requires frequent adjustment to the insulin treatment regimen based on glucose results (either finger stick or CGM readings);   Patient was seen for diabetes management in past 6 months.  -Counseled on hypoglycemia  -Counseled on follow up with podiatry and ophthalmology   -Counseled on lifestyle measures needs to make appt with DM education   UPDATE ON EYE EXAM       2. Hypertension  Well controlled on imdur, lisinopril and metoprolol       3. Obesity   Discussed lifestyle changes           I have discussed the diagnosis with the patient and the intended plan .  The patient has received an after-visit summary and questions were answered concerning future plans.  I have discussed medication side effects .    Thank you for allowing me to participate in the care of this patient.    Latasha Edge      There are no Patient Instructions on file for this visit.  No follow-up provider specified.          Patient /caregiver verbalized understanding .  Voice-recognition software was used to generate this report, which may result in some phonetic-based errors in the grammar and contents.  Even though attempts were made to correct all the mistakes, some may have been missed and remained in the body of the report.

## 2025-03-20 ENCOUNTER — TELEPHONE (OUTPATIENT)
Age: 65
End: 2025-03-20

## 2025-03-20 RX ORDER — PEN NEEDLE, DIABETIC 30 GX3/16"
NEEDLE, DISPOSABLE MISCELLANEOUS
Qty: 400 EACH | Refills: 3 | Status: SHIPPED | OUTPATIENT
Start: 2025-03-20

## 2025-03-20 RX ORDER — GLUCOSAMINE HCL/CHONDROITIN SU 500-400 MG
CAPSULE ORAL
Qty: 400 EACH | Refills: 3 | Status: SHIPPED | OUTPATIENT
Start: 2025-03-20

## 2025-03-21 RX ORDER — BLOOD SUGAR DIAGNOSTIC
STRIP MISCELLANEOUS
Qty: 300 EACH | Refills: 3 | Status: SHIPPED | OUTPATIENT
Start: 2025-03-21

## 2025-03-25 ENCOUNTER — OFFICE VISIT (OUTPATIENT)
Age: 65
End: 2025-03-25
Payer: MEDICARE

## 2025-03-25 VITALS
HEART RATE: 99 BPM | DIASTOLIC BLOOD PRESSURE: 68 MMHG | BODY MASS INDEX: 30.8 KG/M2 | SYSTOLIC BLOOD PRESSURE: 105 MMHG | RESPIRATION RATE: 18 BRPM | HEIGHT: 74 IN | WEIGHT: 240 LBS | OXYGEN SATURATION: 95 %

## 2025-03-25 DIAGNOSIS — R79.89 LOW TESTOSTERONE IN MALE: Chronic | ICD-10-CM

## 2025-03-25 DIAGNOSIS — N40.0 BENIGN PROSTATIC HYPERPLASIA WITHOUT LOWER URINARY TRACT SYMPTOMS: ICD-10-CM

## 2025-03-25 DIAGNOSIS — N52.8 OTHER MALE ERECTILE DYSFUNCTION: Primary | ICD-10-CM

## 2025-03-25 PROCEDURE — 99214 OFFICE O/P EST MOD 30 MIN: CPT | Performed by: UROLOGY

## 2025-03-25 PROCEDURE — 3078F DIAST BP <80 MM HG: CPT | Performed by: UROLOGY

## 2025-03-25 PROCEDURE — 3074F SYST BP LT 130 MM HG: CPT | Performed by: UROLOGY

## 2025-03-25 RX ORDER — TADALAFIL 20 MG/1
20 TABLET ORAL DAILY PRN
Qty: 30 TABLET | Refills: 1 | Status: SHIPPED | OUTPATIENT
Start: 2025-03-25

## 2025-03-25 NOTE — PROGRESS NOTES
1. Have you been to the ER, urgent care clinic since your last visit?  Hospitalized since your last visit?no    2. Have you seen or consulted any other health care providers outside of the [facility] since your last visit?  Include any pap smears or colon screening. no  There were no vitals taken for this visit.  Blood pressure 105/68, pulse 99, resp. rate 18, height 1.88 m (6' 2\"), weight 108.9 kg (240 lb), SpO2 95%.    
syntax, homophones, and other interpretive errors are inadvertently transcribed by the computer software.  Please disregard these errors.  Please excuse any errors that have escaped final proofreading.  Thank you.

## 2025-03-26 LAB
ALBUMIN SERPL-MCNC: 4.8 G/DL (ref 3.9–4.9)
ALP SERPL-CCNC: 97 IU/L (ref 44–121)
ALT SERPL-CCNC: 26 IU/L (ref 0–44)
AST SERPL-CCNC: 21 IU/L (ref 0–40)
BASOPHILS # BLD AUTO: 0 X10E3/UL (ref 0–0.2)
BASOPHILS NFR BLD AUTO: 0 %
BILIRUB SERPL-MCNC: 0.5 MG/DL (ref 0–1.2)
BUN SERPL-MCNC: 13 MG/DL (ref 8–27)
BUN/CREAT SERPL: 13 (ref 10–24)
CALCIUM SERPL-MCNC: 10.3 MG/DL (ref 8.6–10.2)
CHLORIDE SERPL-SCNC: 101 MMOL/L (ref 96–106)
CO2 SERPL-SCNC: 17 MMOL/L (ref 20–29)
CREAT SERPL-MCNC: 0.98 MG/DL (ref 0.76–1.27)
EGFRCR SERPLBLD CKD-EPI 2021: 86 ML/MIN/1.73
EOSINOPHIL # BLD AUTO: 0.1 X10E3/UL (ref 0–0.4)
EOSINOPHIL NFR BLD AUTO: 2 %
ERYTHROCYTE [DISTWIDTH] IN BLOOD BY AUTOMATED COUNT: 13.8 % (ref 11.6–15.4)
GLOBULIN SER CALC-MCNC: 2.5 G/DL (ref 1.5–4.5)
GLUCOSE SERPL-MCNC: 299 MG/DL (ref 70–99)
HCT VFR BLD AUTO: 51.4 % (ref 37.5–51)
HGB BLD-MCNC: 16.9 G/DL (ref 13–17.7)
IMM GRANULOCYTES # BLD AUTO: 0 X10E3/UL (ref 0–0.1)
IMM GRANULOCYTES NFR BLD AUTO: 0 %
LYMPHOCYTES # BLD AUTO: 2.3 X10E3/UL (ref 0.7–3.1)
LYMPHOCYTES NFR BLD AUTO: 33 %
MCH RBC QN AUTO: 28.1 PG (ref 26.6–33)
MCHC RBC AUTO-ENTMCNC: 32.9 G/DL (ref 31.5–35.7)
MCV RBC AUTO: 85 FL (ref 79–97)
MONOCYTES # BLD AUTO: 0.4 X10E3/UL (ref 0.1–0.9)
MONOCYTES NFR BLD AUTO: 5 %
NEUTROPHILS # BLD AUTO: 4.2 X10E3/UL (ref 1.4–7)
NEUTROPHILS NFR BLD AUTO: 60 %
PLATELET # BLD AUTO: 276 X10E3/UL (ref 150–450)
POTASSIUM SERPL-SCNC: 4.1 MMOL/L (ref 3.5–5.2)
PROT SERPL-MCNC: 7.3 G/DL (ref 6–8.5)
RBC # BLD AUTO: 6.02 X10E6/UL (ref 4.14–5.8)
SODIUM SERPL-SCNC: 139 MMOL/L (ref 134–144)
WBC # BLD AUTO: 7 X10E3/UL (ref 3.4–10.8)

## 2025-03-30 LAB
TESTOST FREE SERPL-MCNC: 5 PG/ML (ref 6.6–18.1)
TESTOST SERPL-MCNC: 144 NG/DL (ref 264–916)

## 2025-04-01 DIAGNOSIS — R79.89 LOW TESTOSTERONE IN MALE: Primary | Chronic | ICD-10-CM

## 2025-04-01 RX ORDER — TESTOSTERONE CYPIONATE 200 MG/ML
200 INJECTION, SOLUTION INTRAMUSCULAR
Qty: 6 ML | Refills: 0 | Status: SHIPPED | OUTPATIENT
Start: 2025-04-01 | End: 2025-06-30

## 2025-04-17 ENCOUNTER — CLINICAL SUPPORT (OUTPATIENT)
Age: 65
End: 2025-04-17
Payer: MEDICARE

## 2025-04-17 VITALS — DIASTOLIC BLOOD PRESSURE: 63 MMHG | SYSTOLIC BLOOD PRESSURE: 96 MMHG | HEART RATE: 98 BPM

## 2025-04-17 DIAGNOSIS — R79.89 LOW TESTOSTERONE IN MALE: Primary | Chronic | ICD-10-CM

## 2025-04-17 PROCEDURE — 96372 THER/PROPH/DIAG INJ SC/IM: CPT | Performed by: NURSE PRACTITIONER

## 2025-04-17 RX ORDER — TESTOSTERONE CYPIONATE 200 MG/ML
200 INJECTION, SOLUTION INTRAMUSCULAR ONCE
Status: COMPLETED | OUTPATIENT
Start: 2025-04-17 | End: 2025-04-17

## 2025-04-17 RX ADMIN — TESTOSTERONE CYPIONATE 200 MG: 200 INJECTION, SOLUTION INTRAMUSCULAR at 14:05

## 2025-04-17 NOTE — PROGRESS NOTES
RIGHT SIDE, injection only.  Testosterone cypionate, 200 mg IM.     Last provider visit/labs: re-start of therapy today

## 2025-04-17 NOTE — PROGRESS NOTES
Chief Complaint   Patient presents with    Injections     Testosterone    1. Have you been to the ER, urgent care clinic since your last visit?  Hospitalized since your last visit?No    2. Have you seen or consulted any other health care providers outside of the [facility] since your last visit?  Include any pap smears or colon screening. No  BP 96/63 (BP Site: Right Upper Arm, Patient Position: Sitting, BP Cuff Size: Large Adult)   Pulse 98

## 2025-04-19 DIAGNOSIS — E11.42 TYPE 2 DIABETES MELLITUS WITH DIABETIC POLYNEUROPATHY, WITHOUT LONG-TERM CURRENT USE OF INSULIN (HCC): ICD-10-CM

## 2025-04-29 LAB
BASOPHILS ABSOLUTE: 0 /ΜL
BASOPHILS RELATIVE PERCENT: 0 %
EOSINOPHILS ABSOLUTE: 0.2 /ΜL
EOSINOPHILS RELATIVE PERCENT: 3 %
ESTIMATED AVERAGE GLUCOSE: NORMAL
HBA1C MFR BLD: 11.2 %
HCT VFR BLD CALC: 48 % (ref 41–53)
HEMOGLOBIN: 15.9 G/DL (ref 13.5–17.5)
LYMPHOCYTES ABSOLUTE: 2 /ΜL
LYMPHOCYTES RELATIVE PERCENT: 32 %
MCH RBC QN AUTO: 28.2 PG
MCHC RBC AUTO-ENTMCNC: 33.1 G/DL
MCV RBC AUTO: 85 FL
MONOCYTES ABSOLUTE: 0.5 /ΜL
MONOCYTES RELATIVE PERCENT: 8 %
NEUTROPHILS ABSOLUTE: 3.4 /ΜL
NEUTROPHILS RELATIVE PERCENT: 57 %
PLATELET # BLD: 246 K/ΜL
PMV BLD AUTO: NORMAL FL
RBC # BLD: 5.63 10^6/ΜL
WBC # BLD: 6.1 10^3/ML

## 2025-05-26 ENCOUNTER — APPOINTMENT (OUTPATIENT)
Facility: HOSPITAL | Age: 65
End: 2025-05-26
Payer: MEDICARE

## 2025-05-26 ENCOUNTER — HOSPITAL ENCOUNTER (EMERGENCY)
Facility: HOSPITAL | Age: 65
Discharge: HOME | End: 2025-05-26
Attending: EMERGENCY MEDICINE
Payer: MEDICARE

## 2025-05-26 VITALS
DIASTOLIC BLOOD PRESSURE: 86 MMHG | TEMPERATURE: 97.5 F | HEART RATE: 78 BPM | RESPIRATION RATE: 20 BRPM | OXYGEN SATURATION: 96 % | WEIGHT: 254 LBS | SYSTOLIC BLOOD PRESSURE: 135 MMHG | BODY MASS INDEX: 32.6 KG/M2 | HEIGHT: 74 IN

## 2025-05-26 DIAGNOSIS — M54.42 ACUTE LEFT-SIDED LOW BACK PAIN WITH LEFT-SIDED SCIATICA: Primary | ICD-10-CM

## 2025-05-26 DIAGNOSIS — M51.369 DEGENERATION OF INTERVERTEBRAL DISC OF LUMBAR REGION, UNSPECIFIED WHETHER PAIN PRESENT: ICD-10-CM

## 2025-05-26 PROCEDURE — 73502 X-RAY EXAM HIP UNI 2-3 VIEWS: CPT

## 2025-05-26 PROCEDURE — 6370000000 HC RX 637 (ALT 250 FOR IP): Performed by: EMERGENCY MEDICINE

## 2025-05-26 PROCEDURE — 99284 EMERGENCY DEPT VISIT MOD MDM: CPT

## 2025-05-26 PROCEDURE — 72100 X-RAY EXAM L-S SPINE 2/3 VWS: CPT

## 2025-05-26 PROCEDURE — 96372 THER/PROPH/DIAG INJ SC/IM: CPT

## 2025-05-26 PROCEDURE — 6360000002 HC RX W HCPCS: Performed by: EMERGENCY MEDICINE

## 2025-05-26 RX ORDER — LIDOCAINE 4 G/G
1 PATCH TOPICAL DAILY PRN
Qty: 30 PATCH | Refills: 0 | Status: SHIPPED | OUTPATIENT
Start: 2025-05-26 | End: 2025-06-25

## 2025-05-26 RX ORDER — OXYCODONE AND ACETAMINOPHEN 5; 325 MG/1; MG/1
2 TABLET ORAL
Refills: 0 | Status: COMPLETED | OUTPATIENT
Start: 2025-05-26 | End: 2025-05-26

## 2025-05-26 RX ORDER — KETOROLAC TROMETHAMINE 30 MG/ML
30 INJECTION, SOLUTION INTRAMUSCULAR; INTRAVENOUS ONCE
Status: COMPLETED | OUTPATIENT
Start: 2025-05-26 | End: 2025-05-26

## 2025-05-26 RX ORDER — PREDNISONE 20 MG/1
40 TABLET ORAL
Status: COMPLETED | OUTPATIENT
Start: 2025-05-26 | End: 2025-05-26

## 2025-05-26 RX ORDER — METHYLPREDNISOLONE 4 MG/1
TABLET ORAL
Qty: 1 KIT | Refills: 0 | Status: SHIPPED | OUTPATIENT
Start: 2025-05-26 | End: 2025-06-01

## 2025-05-26 RX ORDER — KETOROLAC TROMETHAMINE 30 MG/ML
30 INJECTION, SOLUTION INTRAMUSCULAR; INTRAVENOUS ONCE
Status: DISCONTINUED | OUTPATIENT
Start: 2025-05-26 | End: 2025-05-26

## 2025-05-26 RX ORDER — OXYCODONE AND ACETAMINOPHEN 5; 325 MG/1; MG/1
1 TABLET ORAL EVERY 6 HOURS PRN
Qty: 8 TABLET | Refills: 0 | Status: SHIPPED | OUTPATIENT
Start: 2025-05-26 | End: 2025-05-29

## 2025-05-26 RX ADMIN — OXYCODONE AND ACETAMINOPHEN 2 TABLET: 5; 325 TABLET ORAL at 04:47

## 2025-05-26 RX ADMIN — KETOROLAC TROMETHAMINE 30 MG: 30 INJECTION, SOLUTION INTRAMUSCULAR at 04:47

## 2025-05-26 RX ADMIN — PREDNISONE 40 MG: 20 TABLET ORAL at 04:46

## 2025-05-26 ASSESSMENT — PAIN - FUNCTIONAL ASSESSMENT: PAIN_FUNCTIONAL_ASSESSMENT: 0-10

## 2025-05-26 ASSESSMENT — PAIN SCALES - GENERAL
PAINLEVEL_OUTOF10: 7
PAINLEVEL_OUTOF10: 6
PAINLEVEL_OUTOF10: 9

## 2025-05-26 NOTE — ED PROVIDER NOTES
[facility] EMERGENCY DEPT  EMERGENCY DEPARTMENT HISTORY AND PHYSICAL EXAM      Date of evaluation: 5/26/2025  Patient Name: Qasim Soler  Birthdate 1960  MRN: [id number]  ED Provider: Paresh Villanueva DO   Note Started: 5:03 AM EDT 5/26/25    HISTORY OF PRESENT ILLNESS     Chief Complaint   Patient presents with    Back Pain       History Provided By: Patient, only     HPI:   Patient is a 64-year-old male who is presenting to the ED with a chief complaint of left-sided low back and hip pain.  It began over the last 3 days.  Denies any injury, started mild in the left buttock now became more severe.  Rates as a 9 out of 10.  Has history of sciatica feels somewhat similar.  Denies any fevers or chills, chest pain shortness of breath numbness, tingling or weakness in extremities or any saddle anesthesia.  There is no fall or trauma.          PAST MEDICAL HISTORY   Past Medical History:  Past Medical History:   Diagnosis Date    Arrhythmia     Hx of AFIB    Atrial fibrillation (HCC)     CAD (coronary artery disease)     Diabetes (HCC)     Diverticulosis     Dysphagia     GERD (gastroesophageal reflux disease)     Hx of hemorrhoids     Hypercholesterolemia     Hypertension     Sciatica        Past Surgical History:  Past Surgical History:   Procedure Laterality Date    CARDIAC PROCEDURE N/A 7/1/2024    Left heart cath / coronary angiography performed by Barber Waddell MD at [facility] CARDIAC CATH LAB    CARDIAC PROCEDURE N/A 7/1/2024    Instantaneous wave-free ratio (iFR) performed by Barber Waddell MD at [facility] CARDIAC CATH LAB    CARDIAC PROCEDURE N/A 7/1/2024    Insert stent angelica coronary performed by Barber Waddell MD at [facility] CARDIAC CATH LAB    CARDIAC PROCEDURE N/A 7/1/2024    Percutaneous coronary intervention performed by Barber Waddell MD at [facility] CARDIAC CATH LAB    COLONOSCOPY      ORTHOPEDIC SURGERY Left     big toe amputated    ORTHOPEDIC SURGERY Left     wrist     MA UNLISTED PROCEDURE CARDIAC SURGERY

## 2025-05-26 NOTE — DISCHARGE INSTRUCTIONS
1.  I have sent prescription to your pharmacy for Medrol Dosepak was a steroid.  Take care to monitor your blood sugar while taking it as it could increase your blood sugar temporarily.  Zanaflex as a muscle relaxer it may cause a little bit of some lightheadedness or sedation while taking it.  You should not drive while taking it.  I also sent some patches you can apply daily as needed and Percocet's for more severe pain.  You can take small over-the-counter doses of ibuprofen typically 200 to 400 mg every 6 hours while taking the steroid it could make you more likely to have stomach upset or stomach ulcer if you do it for long period of time or if you are taking high doses of ibuprofen.'    Return for any changes or worsening symptoms in the interim, follow-up with an orthopedist, have included ours on your discharge paperwork.    If you develop changes in your symptoms including incontinence of urine, inability to urinate, incontinence of stool, high fever, leg weakness, numbness to your groin or genital region please return.        Thank you for choosing our Emergency Department for your care.  It is our privilege to care for you in your time of need.  In the next several days, you may receive a survey via email or mailed to your home about your experience with our team.  We would greatly appreciate you taking a few minutes to complete the survey, as we use this information to learn what we have done well and what we could be doing better. Thank you for trusting us with your care!    Below you will find a list of your tests from today's visit.   Labs and Radiology Studies  No results found for this or any previous visit (from the past 12 hours).  XR LUMBAR SPINE (2-3 VIEWS)  Result Date: 5/26/2025  INDICATION:   L hip and groin pain Reason for exam:->L hip and groin pain COMPARISON: None FINDINGS: 3 views of the lumbar spine demonstrate diffuse multilevel degenerative disc disease with disc space narrowing and

## 2025-05-26 NOTE — ED TRIAGE NOTES
Pt states that on Friday he began having left sided low back pain that goes to his butt and down his thigh.  Hx of sciatica states it feels similar

## 2025-05-27 ENCOUNTER — CLINICAL SUPPORT (OUTPATIENT)
Age: 65
End: 2025-05-27
Payer: MEDICARE

## 2025-05-27 VITALS — DIASTOLIC BLOOD PRESSURE: 80 MMHG | SYSTOLIC BLOOD PRESSURE: 125 MMHG | HEART RATE: 93 BPM

## 2025-05-27 DIAGNOSIS — R79.89 LOW TESTOSTERONE IN MALE: Primary | Chronic | ICD-10-CM

## 2025-05-27 PROCEDURE — 96372 THER/PROPH/DIAG INJ SC/IM: CPT | Performed by: NURSE PRACTITIONER

## 2025-05-27 RX ORDER — TESTOSTERONE CYPIONATE 200 MG/ML
200 INJECTION, SOLUTION INTRAMUSCULAR ONCE
Status: COMPLETED | OUTPATIENT
Start: 2025-05-27 | End: 2025-05-27

## 2025-05-27 RX ADMIN — TESTOSTERONE CYPIONATE 200 MG: 200 INJECTION, SOLUTION INTRAMUSCULAR at 11:30

## 2025-05-27 NOTE — PROGRESS NOTES
LEFT SIDE, injection only.  Testosterone cypionate, 200 mg IM.     Last provider visit/labs: restarted therapy on 4/17/25.  No injections since.    Was seen in ER yesterday for sciatica.

## 2025-05-27 NOTE — PROGRESS NOTES
Chief Complaint   Patient presents with    Injections     Testosterone    1. Have you been to the ER, urgent care clinic since your last visit?  Hospitalized since your last visit?Yes When: 05-    2. Have you seen or consulted any other health care providers outside of the [facility] since your last visit?  Include any pap smears or colon screening. No  /80 (BP Site: Left Upper Arm, Patient Position: Sitting, BP Cuff Size: Large Adult)   Pulse 93

## 2025-06-08 ENCOUNTER — APPOINTMENT (OUTPATIENT)
Facility: HOSPITAL | Age: 65
End: 2025-06-08
Payer: MEDICARE

## 2025-06-08 ENCOUNTER — HOSPITAL ENCOUNTER (EMERGENCY)
Facility: HOSPITAL | Age: 65
Discharge: HOME | End: 2025-06-08
Attending: STUDENT IN AN ORGANIZED HEALTH CARE EDUCATION/TRAINING PROGRAM
Payer: MEDICARE

## 2025-06-08 VITALS
OXYGEN SATURATION: 96 % | DIASTOLIC BLOOD PRESSURE: 70 MMHG | WEIGHT: 248 LBS | TEMPERATURE: 97.5 F | HEART RATE: 100 BPM | RESPIRATION RATE: 15 BRPM | SYSTOLIC BLOOD PRESSURE: 92 MMHG | HEIGHT: 74 IN | BODY MASS INDEX: 31.83 KG/M2

## 2025-06-08 DIAGNOSIS — S76.012A MUSCLE STRAIN OF LEFT HIP, INITIAL ENCOUNTER: Primary | ICD-10-CM

## 2025-06-08 PROCEDURE — 99284 EMERGENCY DEPT VISIT MOD MDM: CPT

## 2025-06-08 PROCEDURE — 73700 CT LOWER EXTREMITY W/O DYE: CPT

## 2025-06-08 PROCEDURE — 6360000002 HC RX W HCPCS: Performed by: STUDENT IN AN ORGANIZED HEALTH CARE EDUCATION/TRAINING PROGRAM

## 2025-06-08 PROCEDURE — 96372 THER/PROPH/DIAG INJ SC/IM: CPT

## 2025-06-08 RX ORDER — KETOROLAC TROMETHAMINE 30 MG/ML
30 INJECTION, SOLUTION INTRAMUSCULAR; INTRAVENOUS
Status: COMPLETED | OUTPATIENT
Start: 2025-06-08 | End: 2025-06-08

## 2025-06-08 RX ORDER — KETOROLAC TROMETHAMINE 30 MG/ML
30 INJECTION, SOLUTION INTRAMUSCULAR; INTRAVENOUS
Status: DISCONTINUED | OUTPATIENT
Start: 2025-06-08 | End: 2025-06-08

## 2025-06-08 RX ORDER — ACETAMINOPHEN 500 MG
1000 TABLET ORAL EVERY 6 HOURS
Qty: 30 TABLET | Refills: 1 | Status: SHIPPED | OUTPATIENT
Start: 2025-06-08

## 2025-06-08 RX ADMIN — KETOROLAC TROMETHAMINE 30 MG: 30 INJECTION, SOLUTION INTRAMUSCULAR at 08:52

## 2025-06-08 ASSESSMENT — PAIN DESCRIPTION - LOCATION
LOCATION: BACK
LOCATION: BACK

## 2025-06-08 ASSESSMENT — PAIN DESCRIPTION - ORIENTATION
ORIENTATION: LOWER
ORIENTATION: LEFT;LOWER

## 2025-06-08 ASSESSMENT — PAIN DESCRIPTION - DESCRIPTORS: DESCRIPTORS: ACHING

## 2025-06-08 ASSESSMENT — PAIN SCALES - GENERAL
PAINLEVEL_OUTOF10: 5
PAINLEVEL_OUTOF10: 10

## 2025-06-08 ASSESSMENT — PAIN - FUNCTIONAL ASSESSMENT
PAIN_FUNCTIONAL_ASSESSMENT: ACTIVITIES ARE NOT PREVENTED
PAIN_FUNCTIONAL_ASSESSMENT: 0-10

## 2025-06-08 NOTE — ED TRIAGE NOTES
C/o left lower back pain, was seen here last week for similar issue, prescribed meds, little relief. Denies recent falls, injury or trauma.

## 2025-06-08 NOTE — ED PROVIDER NOTES
aspart  Inject 14 Units into the skin 3 times daily (before meals)     OneTouch Ultra strip  Generic drug: blood glucose test strips  Use to check BG 3 times daily. Dx code E11.65     pantoprazole sodium 40 MG Pack packet  Commonly known as: PROTONIX     pregabalin 75 MG capsule  Commonly known as: LYRICA  Take 1 capsule by mouth 3 times daily for 90 days. Max Daily Amount: 225 mg     rosuvastatin 40 MG tablet  Commonly known as: CRESTOR  Take 1 tablet by mouth nightly     Semglee (yfgn) 100 UNIT/ML Sopn  Generic drug: Insulin Glargine-yfgn     tadalafil 20 MG tablet  Commonly known as: CIALIS  Take 1 tablet by mouth daily as needed for Erectile Dysfunction     testosterone cypionate 200 MG/ML injection  Commonly known as: DEPOTESTOTERONE CYPIONATE  Inject 1 mL into the muscle every 14 days for 90 days. Max Daily Amount: 200 mg     ticagrelor 90 MG Tabs tablet  Commonly known as: BRILINTA  Take 1 tablet by mouth 2 times daily               Where to Get Your Medications        These medications were sent to [facility] [address] [place] [address] - P [phone] - F [phone]  [address] [place] [address]      Phone: [phone]   acetaminophen 500 MG tablet  diclofenac sodium 1 % Gel           DISCONTINUED MEDICATIONS:  Discharge Medication List as of 6/8/2025 10:23 AM          I am the Primary Clinician of Record. Monty Winkler MD (electronically signed)    (Please note that parts of this dictation were completed with voice recognition software. Quite often unanticipated grammatical, syntax, homophones, and other interpretive errors are inadvertently transcribed by the computer software. Please disregards these errors. Please excuse any errors that have escaped final proofreading.)     Monty Winkler MD  06/09/25 [id number]

## 2025-06-09 NOTE — PROGRESS NOTES
RIGHT SIDE, injection only.  Testosterone cypionate, 200 mg IM.     Last provider visit/labs: restarted therapy on 4/17/25. He will obtain labs prior to his next visit date.

## 2025-06-10 ENCOUNTER — CLINICAL SUPPORT (OUTPATIENT)
Age: 65
End: 2025-06-10
Payer: MEDICARE

## 2025-06-10 VITALS — DIASTOLIC BLOOD PRESSURE: 86 MMHG | SYSTOLIC BLOOD PRESSURE: 131 MMHG | HEART RATE: 97 BPM

## 2025-06-10 DIAGNOSIS — R79.89 LOW TESTOSTERONE IN MALE: Chronic | ICD-10-CM

## 2025-06-10 DIAGNOSIS — N52.8 OTHER MALE ERECTILE DYSFUNCTION: ICD-10-CM

## 2025-06-10 DIAGNOSIS — R79.89 LOW TESTOSTERONE IN MALE: Primary | ICD-10-CM

## 2025-06-10 PROCEDURE — 96372 THER/PROPH/DIAG INJ SC/IM: CPT | Performed by: NURSE PRACTITIONER

## 2025-06-10 RX ORDER — TADALAFIL 20 MG/1
TABLET ORAL
Qty: 30 TABLET | Refills: 0 | Status: SHIPPED | OUTPATIENT
Start: 2025-06-10

## 2025-06-10 RX ORDER — TESTOSTERONE CYPIONATE 200 MG/ML
200 INJECTION, SOLUTION INTRAMUSCULAR ONCE
Status: COMPLETED | OUTPATIENT
Start: 2025-06-10 | End: 2025-06-10

## 2025-06-10 RX ADMIN — TESTOSTERONE CYPIONATE 200 MG: 200 INJECTION, SOLUTION INTRAMUSCULAR at 11:36

## 2025-06-10 NOTE — PROGRESS NOTES
Chief Complaint   Patient presents with    Injections     Testosterone    1. Have you been to the ER, urgent care clinic since your last visit?  Hospitalized since your last visit?Yes When: 06- for Muscle strain on left hip/back pain    2. Have you seen or consulted any other health care providers outside of the [facility] since your last visit?  Include any pap smears or colon screening. No  /86 (BP Site: Right Upper Arm, Patient Position: Sitting, BP Cuff Size: Medium Adult)   Pulse 97

## 2025-06-11 LAB
ALBUMIN URINE, EXTERNAL: 300.8
BUN BLDV-MCNC: 10 MG/DL
CALCIUM SERPL-MCNC: 10.1 MG/DL
CHLORIDE BLD-SCNC: 102 MMOL/L
CHOLESTEROL, TOTAL: 201 MG/DL
CHOLESTEROL/HDL RATIO: NORMAL
CO2: 20 MMOL/L
CREAT SERPL-MCNC: 0.92 MG/DL
CREATININE, URINE, EXTERNAL: 177.4
EGFR: 93
ESTIMATED AVERAGE GLUCOSE: NORMAL
GLUCOSE BLD-MCNC: 124 MG/DL
HBA1C MFR BLD: 9.7 %
HDLC SERPL-MCNC: 48 MG/DL (ref 35–70)
LDL CHOLESTEROL: 122
MICROALBUMIN/CREAT UR: 170 MG/G{CREAT}
NONHDLC SERPL-MCNC: NORMAL MG/DL
POTASSIUM SERPL-SCNC: 3.5 MMOL/L
SODIUM BLD-SCNC: 142 MMOL/L
TRIGL SERPL-MCNC: 178 MG/DL
VLDLC SERPL CALC-MCNC: 31 MG/DL

## 2025-06-13 ENCOUNTER — OFFICE VISIT (OUTPATIENT)
Age: 65
End: 2025-06-13
Payer: MEDICARE

## 2025-06-13 VITALS
OXYGEN SATURATION: 98 % | DIASTOLIC BLOOD PRESSURE: 82 MMHG | SYSTOLIC BLOOD PRESSURE: 121 MMHG | HEART RATE: 92 BPM | HEIGHT: 74 IN | WEIGHT: 241.5 LBS | BODY MASS INDEX: 30.99 KG/M2 | TEMPERATURE: 98.9 F

## 2025-06-13 DIAGNOSIS — E11.65 UNCONTROLLED TYPE 2 DIABETES MELLITUS WITH HYPERGLYCEMIA (HCC): ICD-10-CM

## 2025-06-13 DIAGNOSIS — E11.65 UNCONTROLLED TYPE 2 DIABETES MELLITUS WITH HYPERGLYCEMIA (HCC): Primary | ICD-10-CM

## 2025-06-13 PROCEDURE — 3046F HEMOGLOBIN A1C LEVEL >9.0%: CPT | Performed by: STUDENT IN AN ORGANIZED HEALTH CARE EDUCATION/TRAINING PROGRAM

## 2025-06-13 PROCEDURE — 3079F DIAST BP 80-89 MM HG: CPT | Performed by: STUDENT IN AN ORGANIZED HEALTH CARE EDUCATION/TRAINING PROGRAM

## 2025-06-13 PROCEDURE — 99214 OFFICE O/P EST MOD 30 MIN: CPT | Performed by: STUDENT IN AN ORGANIZED HEALTH CARE EDUCATION/TRAINING PROGRAM

## 2025-06-13 PROCEDURE — 3074F SYST BP LT 130 MM HG: CPT | Performed by: STUDENT IN AN ORGANIZED HEALTH CARE EDUCATION/TRAINING PROGRAM

## 2025-06-13 RX ORDER — HYDROCHLOROTHIAZIDE 12.5 MG/1
CAPSULE ORAL
Qty: 1 EACH | Refills: 0 | COMMUNITY
Start: 2025-06-13

## 2025-06-13 RX ORDER — OXYCODONE AND ACETAMINOPHEN 5; 325 MG/1; MG/1
1 TABLET ORAL EVERY 6 HOURS PRN
COMMUNITY

## 2025-06-13 RX ORDER — MELOXICAM 15 MG/1
15 TABLET ORAL DAILY PRN
COMMUNITY
Start: 2025-05-15

## 2025-06-13 RX ORDER — ASPIRIN 81 MG/1
81 TABLET ORAL DAILY
COMMUNITY

## 2025-06-13 RX ORDER — IBUPROFEN 800 MG/1
800 TABLET, FILM COATED ORAL EVERY 6 HOURS PRN
COMMUNITY
Start: 2024-07-12

## 2025-06-13 RX ORDER — METHYLPREDNISOLONE 4 MG/1
4 TABLET ORAL
COMMUNITY

## 2025-06-16 ENCOUNTER — TELEPHONE (OUTPATIENT)
Age: 65
End: 2025-06-16

## 2025-06-16 NOTE — TELEPHONE ENCOUNTER
Number listed below was not the correct number I am told.  I called the number attached to the pharmacy and left a message.      RX was not sent to them.  It was sent locally to [facility].  Unclear what the issue is.

## 2025-06-16 NOTE — TELEPHONE ENCOUNTER
Pharmacy called and let voicemail about if we receive a fax from urology about the patient Prescription testosterone cypionate (DEPOTESTOTERONE CYPIONATE) injection 200 mg   Pharmacy would like a call back at [phone]

## 2025-06-17 NOTE — TELEPHONE ENCOUNTER
Our office has never sent a medication to [facility] pharmacy, nor have we received anything from them. I'm not sure what you meant in your original message, but pt should be receiving testosterone from [facility].

## 2025-06-20 ENCOUNTER — TRANSCRIBE ORDERS (OUTPATIENT)
Facility: HOSPITAL | Age: 65
End: 2025-06-20

## 2025-06-20 DIAGNOSIS — M54.16 RADICULOPATHY OF LUMBAR REGION: Primary | ICD-10-CM

## 2025-06-20 DIAGNOSIS — M47.816 LUMBAR SPONDYLOSIS: ICD-10-CM

## 2025-06-28 LAB
ALBUMIN SERPL-MCNC: 4.9 G/DL (ref 3.9–4.9)
ALBUMIN/CREAT UR: 119 MG/G CREAT (ref 0–29)
ALP SERPL-CCNC: 89 IU/L (ref 44–121)
ALT SERPL-CCNC: 27 IU/L (ref 0–44)
AST SERPL-CCNC: 23 IU/L (ref 0–40)
BILIRUB SERPL-MCNC: 0.6 MG/DL (ref 0–1.2)
BUN SERPL-MCNC: 16 MG/DL (ref 8–27)
BUN/CREAT SERPL: 17 (ref 10–24)
CALCIUM SERPL-MCNC: 10.6 MG/DL (ref 8.6–10.2)
CHLORIDE SERPL-SCNC: 98 MMOL/L (ref 96–106)
CHOLEST SERPL-MCNC: 207 MG/DL (ref 100–199)
CO2 SERPL-SCNC: 22 MMOL/L (ref 20–29)
CREAT SERPL-MCNC: 0.94 MG/DL (ref 0.76–1.27)
CREAT UR-MCNC: 75.5 MG/DL
EGFRCR SERPLBLD CKD-EPI 2021: 91 ML/MIN/1.73
ERYTHROCYTE [DISTWIDTH] IN BLOOD BY AUTOMATED COUNT: 15.6 % (ref 11.6–15.4)
GLOBULIN SER CALC-MCNC: 2.5 G/DL (ref 1.5–4.5)
GLUCOSE SERPL-MCNC: 213 MG/DL (ref 70–99)
HCT VFR BLD AUTO: 58.4 % (ref 37.5–51)
HDLC SERPL-MCNC: 42 MG/DL
HGB BLD-MCNC: 18.9 G/DL (ref 13–17.7)
LDLC SERPL CALC-MCNC: 114 MG/DL (ref 0–99)
MCH RBC QN AUTO: 28.3 PG (ref 26.6–33)
MCHC RBC AUTO-ENTMCNC: 32.4 G/DL (ref 31.5–35.7)
MCV RBC AUTO: 87 FL (ref 79–97)
MICROALBUMIN UR-MCNC: 90.2 UG/ML
PLATELET # BLD AUTO: 292 X10E3/UL (ref 150–450)
POTASSIUM SERPL-SCNC: 3.8 MMOL/L (ref 3.5–5.2)
PROT SERPL-MCNC: 7.4 G/DL (ref 6–8.5)
RBC # BLD AUTO: 6.69 X10E6/UL (ref 4.14–5.8)
SODIUM SERPL-SCNC: 139 MMOL/L (ref 134–144)
TRIGL SERPL-MCNC: 297 MG/DL (ref 0–149)
VLDLC SERPL CALC-MCNC: 51 MG/DL (ref 5–40)
WBC # BLD AUTO: 7 X10E3/UL (ref 3.4–10.8)

## 2025-07-01 LAB
TESTOST FREE SERPL-MCNC: 9.5 PG/ML (ref 6.6–18.1)
TESTOST SERPL-MCNC: 328 NG/DL (ref 264–916)

## 2025-07-02 ENCOUNTER — RESULTS FOLLOW-UP (OUTPATIENT)
Age: 65
End: 2025-07-02

## 2025-07-03 ENCOUNTER — HOSPITAL ENCOUNTER (EMERGENCY)
Facility: HOSPITAL | Age: 65
Discharge: HOME | End: 2025-07-03
Attending: EMERGENCY MEDICINE
Payer: MEDICARE

## 2025-07-03 VITALS
SYSTOLIC BLOOD PRESSURE: 111 MMHG | BODY MASS INDEX: 31.06 KG/M2 | HEART RATE: 103 BPM | TEMPERATURE: 98.2 F | DIASTOLIC BLOOD PRESSURE: 72 MMHG | WEIGHT: 242 LBS | RESPIRATION RATE: 20 BRPM | HEIGHT: 74 IN | OXYGEN SATURATION: 96 %
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
## 2025-07-03 DIAGNOSIS — R79.89 LOW TESTOSTERONE IN MALE: Primary | Chronic | ICD-10-CM

## 2025-07-03 PROCEDURE — 90714 TD VACC NO PRESV 7 YRS+ IM: CPT | Performed by: EMERGENCY MEDICINE

## 2025-07-03 PROCEDURE — 90375 RABIES IG IM/SC: CPT | Performed by: EMERGENCY MEDICINE

## 2025-07-03 PROCEDURE — 99284 EMERGENCY DEPT VISIT MOD MDM: CPT

## 2025-07-03 PROCEDURE — 6370000000 HC RX 637 (ALT 250 FOR IP): Performed by: EMERGENCY MEDICINE

## 2025-07-03 PROCEDURE — 90472 IMMUNIZATION ADMIN EACH ADD: CPT | Performed by: EMERGENCY MEDICINE

## 2025-07-03 PROCEDURE — 96372 THER/PROPH/DIAG INJ SC/IM: CPT

## 2025-07-03 PROCEDURE — 6360000002 HC RX W HCPCS: Performed by: EMERGENCY MEDICINE

## 2025-07-03 PROCEDURE — 90471 IMMUNIZATION ADMIN: CPT | Performed by: EMERGENCY MEDICINE

## 2025-07-03 PROCEDURE — 90675 RABIES VACCINE IM: CPT | Performed by: EMERGENCY MEDICINE

## 2025-07-03 RX ORDER — ACETAMINOPHEN 500 MG
1000 TABLET ORAL
Status: COMPLETED | OUTPATIENT
Start: 2025-07-03 | End: 2025-07-03

## 2025-07-03 RX ORDER — IBUPROFEN 600 MG/1
600 TABLET, FILM COATED ORAL
Status: COMPLETED | OUTPATIENT
Start: 2025-07-03 | End: 2025-07-03

## 2025-07-03 RX ORDER — ACETAMINOPHEN 500 MG
500 TABLET ORAL EVERY 6 HOURS PRN
Qty: 20 TABLET | Refills: 0 | Status: SHIPPED | OUTPATIENT
Start: 2025-07-03 | End: 2025-07-10

## 2025-07-03 RX ADMIN — AMOXICILLIN AND CLAVULANATE POTASSIUM 1 TABLET: 875; 125 TABLET, FILM COATED ORAL at 14:55

## 2025-07-03 RX ADMIN — IBUPROFEN 600 MG: 600 TABLET ORAL at 14:56

## 2025-07-03 RX ADMIN — ACETAMINOPHEN 1000 MG: 500 TABLET ORAL at 14:55

## 2025-07-03 RX ADMIN — RABIES VACCINE 1 ML: KIT at 15:09

## 2025-07-03 RX ADMIN — RABIES IMMUNE GLOBULIN (HUMAN) 2190 UNITS: 300 INJECTION, SOLUTION INFILTRATION; INTRAMUSCULAR at 15:10

## 2025-07-03 RX ADMIN — CLOSTRIDIUM TETANI TOXOID ANTIGEN (FORMALDEHYDE INACTIVATED) AND CORYNEBACTERIUM DIPHTHERIAE TOXOID ANTIGEN (FORMALDEHYDE INACTIVATED) 0.5 ML: 5; 2 INJECTION, SUSPENSION INTRAMUSCULAR at 14:58

## 2025-07-03 ASSESSMENT — LIFESTYLE VARIABLES
HOW OFTEN DO YOU HAVE A DRINK CONTAINING ALCOHOL: 2-4 TIMES A MONTH
HOW MANY STANDARD DRINKS CONTAINING ALCOHOL DO YOU HAVE ON A TYPICAL DAY: 1 OR 2

## 2025-07-03 ASSESSMENT — PAIN DESCRIPTION - ORIENTATION: ORIENTATION: LEFT;LOWER

## 2025-07-03 ASSESSMENT — PAIN SCALES - GENERAL: PAINLEVEL_OUTOF10: 6

## 2025-07-03 ASSESSMENT — PAIN DESCRIPTION - LOCATION: LOCATION: LEG

## 2025-07-03 NOTE — ED PROVIDER NOTES
[facility] EMERGENCY DEPT  EMERGENCY DEPARTMENT HISTORY AND PHYSICAL EXAM      Date of evaluation: 7/3/2025  Patient Name: Qasim Soler  Birthdate 1960  MRN: [id number]  ED Provider: Saud Beck DO   Note Started: 2:21 PM EDT 7/3/25    HISTORY OF PRESENT ILLNESS     Chief Complaint   Patient presents with    Animal Bite     History Provided By: Patient    HPI: 64-year-old male with history below who presents with dog bite to his left lower leg.  States it happened earlier today.  He does not know who his dog is.  Believes it was a pitbull.  Happened after he was cutting grass.  He does not know his last tetanus shot.  Unknown if the dog is vaccinated.    PAST MEDICAL HISTORY   Past Medical History:  Past Medical History:   Diagnosis Date    Arrhythmia     Hx of AFIB    Atrial fibrillation (HCC)     CAD (coronary artery disease)     Diabetes (HCC)     Diverticulosis     Dysphagia     GERD (gastroesophageal reflux disease)     Hx of hemorrhoids     Hypercholesterolemia     Hypertension     Sciatica        Past Surgical History:  Past Surgical History:   Procedure Laterality Date    CARDIAC PROCEDURE N/A 7/1/2024    Left heart cath / coronary angiography performed by Barber Waddell MD at [facility] CARDIAC CATH LAB    CARDIAC PROCEDURE N/A 7/1/2024    Instantaneous wave-free ratio (iFR) performed by Barber Waddell MD at [facility] CARDIAC CATH LAB    CARDIAC PROCEDURE N/A 7/1/2024    Insert stent angelica coronary performed by Barber Waddell MD at [facility] CARDIAC CATH LAB    CARDIAC PROCEDURE N/A 7/1/2024    Percutaneous coronary intervention performed by Barber Waddell MD at [facility] CARDIAC CATH LAB    COLONOSCOPY      ORTHOPEDIC SURGERY Left     big toe amputated    ORTHOPEDIC SURGERY Left     wrist     DC UNLISTED PROCEDURE CARDIAC SURGERY      Atrial fibrilation     SHOULDER ARTHROSCOPY Right 06/09/2021       Family History:  Family History   Problem Relation Age of Onset    Cancer Father        Social History:  Social History

## 2025-07-04 DIAGNOSIS — R79.89 LOW TESTOSTERONE IN MALE: Chronic | ICD-10-CM

## 2025-07-04 DIAGNOSIS — N52.8 OTHER MALE ERECTILE DYSFUNCTION: ICD-10-CM

## 2025-07-06 ENCOUNTER — HOSPITAL ENCOUNTER (EMERGENCY)
Facility: HOSPITAL | Age: 65
Discharge: HOME | End: 2025-07-06
Payer: MEDICARE

## 2025-07-06 VITALS
BODY MASS INDEX: 31.06 KG/M2 | HEART RATE: 96 BPM | OXYGEN SATURATION: 95 % | DIASTOLIC BLOOD PRESSURE: 97 MMHG | WEIGHT: 242 LBS | SYSTOLIC BLOOD PRESSURE: 149 MMHG | HEIGHT: 74 IN | RESPIRATION RATE: 16 BRPM | TEMPERATURE: 97.5 F

## 2025-07-06 DIAGNOSIS — Z23 NEED FOR PROPHYLACTIC VACCINATION AND INOCULATION AGAINST RABIES: Primary | ICD-10-CM

## 2025-07-06 PROCEDURE — 6360000002 HC RX W HCPCS: Performed by: NURSE PRACTITIONER

## 2025-07-06 PROCEDURE — 4500000002 HC ER NO CHARGE

## 2025-07-06 PROCEDURE — 90471 IMMUNIZATION ADMIN: CPT | Performed by: NURSE PRACTITIONER

## 2025-07-06 PROCEDURE — 90675 RABIES VACCINE IM: CPT | Performed by: NURSE PRACTITIONER

## 2025-07-06 RX ADMIN — RABIES VACCINE 1 ML: KIT at 12:52

## 2025-07-06 ASSESSMENT — LIFESTYLE VARIABLES
HOW MANY STANDARD DRINKS CONTAINING ALCOHOL DO YOU HAVE ON A TYPICAL DAY: 1 OR 2
HOW OFTEN DO YOU HAVE A DRINK CONTAINING ALCOHOL: MONTHLY OR LESS

## 2025-07-06 ASSESSMENT — PAIN SCALES - GENERAL: PAINLEVEL_OUTOF10: 8

## 2025-07-06 ASSESSMENT — PAIN DESCRIPTION - LOCATION: LOCATION: BACK

## 2025-07-06 NOTE — ED PROVIDER NOTES
[facility] EMERGENCY DEPT  EMERGENCY DEPARTMENT HISTORY AND PHYSICAL EXAM      Date of evaluation: 7/6/2025  Patient Name: Qasim Soler  Birthdate 1960  MRN: [id number]  ED Provider: DENITA Pham NP   Note Started: 1:03 PM EDT 7/6/25    HISTORY OF PRESENT ILLNESS     Chief Complaint   Patient presents with    rabies shot       History Provided By: Patient, only     HPI: Qasim Soler is a 64 y.o. male with past medical history as listed below presents to the ER for rabies vaccine.  Patient is on shot #3 for his rabies series.  Patient has no other complaints.    PAST MEDICAL HISTORY   Past Medical History:  Past Medical History:   Diagnosis Date    Arrhythmia     Hx of AFIB    Atrial fibrillation (HCC)     CAD (coronary artery disease)     Diabetes (HCC)     Diverticulosis     Dysphagia     GERD (gastroesophageal reflux disease)     Hx of hemorrhoids     Hypercholesterolemia     Hypertension     Sciatica        Past Surgical History:  Past Surgical History:   Procedure Laterality Date    CARDIAC PROCEDURE N/A 7/1/2024    Left heart cath / coronary angiography performed by Barber Waddell MD at [facility] CARDIAC CATH LAB    CARDIAC PROCEDURE N/A 7/1/2024    Instantaneous wave-free ratio (iFR) performed by Barber Waddell MD at [facility] CARDIAC CATH LAB    CARDIAC PROCEDURE N/A 7/1/2024    Insert stent angelica coronary performed by Barber Waddell MD at [facility] CARDIAC CATH LAB    CARDIAC PROCEDURE N/A 7/1/2024    Percutaneous coronary intervention performed by Barber Waddell MD at [facility] CARDIAC CATH LAB    COLONOSCOPY      ORTHOPEDIC SURGERY Left     big toe amputated    ORTHOPEDIC SURGERY Left     wrist     VT UNLISTED PROCEDURE CARDIAC SURGERY      Atrial fibrilation     SHOULDER ARTHROSCOPY Right 06/09/2021       Family History:  Family History   Problem Relation Age of Onset    Cancer Father        Social History:  Social History     Tobacco Use    Smoking status: Former     Current packs/day: 0.25     Average

## 2025-07-07 RX ORDER — TADALAFIL 20 MG/1
TABLET ORAL
Qty: 30 TABLET | Refills: 0 | Status: SHIPPED | OUTPATIENT
Start: 2025-07-07

## 2025-07-10 ENCOUNTER — HOSPITAL ENCOUNTER (EMERGENCY)
Facility: HOSPITAL | Age: 65
Discharge: HOME | End: 2025-07-10
Payer: MEDICARE

## 2025-07-10 VITALS
OXYGEN SATURATION: 96 % | HEART RATE: 100 BPM | TEMPERATURE: 98.2 F | SYSTOLIC BLOOD PRESSURE: 151 MMHG | DIASTOLIC BLOOD PRESSURE: 94 MMHG | RESPIRATION RATE: 16 BRPM

## 2025-07-10 DIAGNOSIS — Z23 NEED FOR PROPHYLACTIC VACCINATION AND INOCULATION AGAINST RABIES: ICD-10-CM
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
## 2025-07-10 PROCEDURE — 90471 IMMUNIZATION ADMIN: CPT

## 2025-07-10 PROCEDURE — 90675 RABIES VACCINE IM: CPT

## 2025-07-10 PROCEDURE — 4500000002 HC ER NO CHARGE

## 2025-07-10 PROCEDURE — 6360000002 HC RX W HCPCS

## 2025-07-10 RX ADMIN — Medication 1 ML: at 20:35

## 2025-07-10 ASSESSMENT — PAIN - FUNCTIONAL ASSESSMENT: PAIN_FUNCTIONAL_ASSESSMENT: NONE - DENIES PAIN

## 2025-07-11 NOTE — ED PROVIDER NOTES
pantoprazole sodium 40 MG Pack packet  Commonly known as: PROTONIX     pregabalin 75 MG capsule  Commonly known as: LYRICA  Take 1 capsule by mouth 3 times daily for 90 days. Max Daily Amount: 225 mg     rosuvastatin 40 MG tablet  Commonly known as: CRESTOR  Take 1 tablet by mouth nightly     tadalafil 20 MG tablet  Commonly known as: CIALIS  TAKE 1 TABLET BY MOUTH ONCE DAILY AS NEEDED FOR ERECTILE DYSFUNCTION     testosterone cypionate 200 MG/ML injection  Commonly known as: DEPOTESTOTERONE CYPIONATE  Inject 1 mL into the muscle every 14 days for 90 days. Max Daily Amount: 200 mg     ticagrelor 90 MG Tabs tablet  Commonly known as: BRILINTA  Take 1 tablet by mouth 2 times daily     tiZANidine 4 MG tablet  Commonly known as: ZANAFLEX           * This list has 3 medication(s) that are the same as other medications prescribed for you. Read the directions carefully, and ask your doctor or other care provider to review them with you.                    DISCONTINUED MEDICATIONS:  Discharge Medication List as of 7/10/2025  8:47 PM          I am the Primary Clinician of Record. Virginia Sung PA-C (electronically signed)    (Please note that parts of this dictation were completed with voice recognition software. Quite often unanticipated grammatical, syntax, homophones, and other interpretive errors are inadvertently transcribed by the computer software. Please disregards these errors. Please excuse any errors that have escaped final proofreading.)     Virginia Sung PA-C  07/10/25 [id number]

## 2025-07-14 ENCOUNTER — HOSPITAL ENCOUNTER (OUTPATIENT)
Facility: HOSPITAL | Age: 65
Discharge: HOME | End: 2025-07-17
Attending: PHYSICAL MEDICINE & REHABILITATION
Payer: MEDICARE

## 2025-07-14 ENCOUNTER — TELEPHONE (OUTPATIENT)
Age: 65
End: 2025-07-14

## 2025-07-14 DIAGNOSIS — M54.16 RADICULOPATHY OF LUMBAR REGION: ICD-10-CM

## 2025-07-14 DIAGNOSIS — M47.816 LUMBAR SPONDYLOSIS: ICD-10-CM

## 2025-07-14 PROCEDURE — 72148 MRI LUMBAR SPINE W/O DYE: CPT

## 2025-07-14 NOTE — TELEPHONE ENCOUNTER
Called patient to offer Friday, patient has an appointment at 3:15 and stated that would be cutting it to make it here by 3:45. He wanted to know If there was anything sooner.

## 2025-07-14 NOTE — TELEPHONE ENCOUNTER
Patient has visit with me for shot tomorrow.  His H/H (red blood count) is FAR too high to give an injection.     Please call and reschedule to Dr. Laughlin- virtual visit is fine.  I cannot give him any injections until he has seen Qian.    Cc: Barbra Power Courtney

## 2025-07-16 ENCOUNTER — OFFICE VISIT (OUTPATIENT)
Age: 65
End: 2025-07-16
Payer: MEDICARE

## 2025-07-16 VITALS
WEIGHT: 242 LBS | BODY MASS INDEX: 31.06 KG/M2 | HEART RATE: 110 BPM | OXYGEN SATURATION: 94 % | SYSTOLIC BLOOD PRESSURE: 141 MMHG | HEIGHT: 74 IN | DIASTOLIC BLOOD PRESSURE: 89 MMHG

## 2025-07-16 DIAGNOSIS — D75.1 SECONDARY POLYCYTHEMIA: Primary | ICD-10-CM

## 2025-07-16 DIAGNOSIS — R79.89 LOW TESTOSTERONE IN MALE: Chronic | ICD-10-CM

## 2025-07-16 DIAGNOSIS — N52.8 OTHER MALE ERECTILE DYSFUNCTION: ICD-10-CM

## 2025-07-16 PROCEDURE — 3077F SYST BP >= 140 MM HG: CPT | Performed by: UROLOGY

## 2025-07-16 PROCEDURE — 3079F DIAST BP 80-89 MM HG: CPT | Performed by: UROLOGY

## 2025-07-16 PROCEDURE — 99214 OFFICE O/P EST MOD 30 MIN: CPT | Performed by: UROLOGY

## 2025-07-16 NOTE — ASSESSMENT & PLAN NOTE
On TRT and tadalafil.  He is still having difficulty.  He is advised to try a Venaseal constricting band.

## 2025-07-16 NOTE — PROGRESS NOTES
Chief Complaint   Patient presents with    Follow-up     Lab results     1. Have you been to the ER, urgent care clinic since your last visit?  Hospitalized since your last visit?Yes When: 7/12/25 Where: [facility] Reason for visit: Back Pain    2. Have you seen or consulted any other health care providers outside of the [facility] since your last visit?  Include any pap smears or colon screening. No  BP (!) 141/89   Pulse (!) 110   Ht 1.88 m (6' 2\")   Wt 109.8 kg (242 lb)   SpO2 94%   BMI 31.07 kg/m²

## 2025-07-16 NOTE — ASSESSMENT & PLAN NOTE
Secondary polycythemia.  He works in the hot sun with [employer].  Dehydration can hemoconcentrate it.   He is advised he is at risk of clots or stroke.  He is advised on either therapeutic phlebotomy or blood donation.  He will look into doing that next week.  If he cannot donate then he should have phlebotomy.  Follow up H/H next visit

## 2025-07-16 NOTE — ASSESSMENT & PLAN NOTE
Somewhat improved with TRT.  We can continue if we get his H/H under control.  Injection given.  I would hold therapy if his H/H increase from current

## 2025-07-16 NOTE — PROGRESS NOTES
HISTORY OF PRESENT ILLNESS  Qasim Soler is a 64 y.o. male.   has a past medical history of Arrhythmia, Atrial fibrillation (HCC), CAD (coronary artery disease), Diabetes (HCC), Diverticulosis, Dysphagia, GERD (gastroesophageal reflux disease), Hx of hemorrhoids, Hypercholesterolemia, Hypertension, and Sciatica.  has a past surgical history that includes orthopedic surgery (Left); Colonoscopy; orthopedic surgery (Left); pr unlisted procedure cardiac surgery; Shoulder arthroscopy (Right, 06/09/2021); Cardiac procedure (N/A, 7/1/2024); Cardiac procedure (N/A, 7/1/2024); Cardiac procedure (N/A, 7/1/2024); and Cardiac procedure (N/A, 7/1/2024).  Chief Complaint   Patient presents with    Follow-up     Lab results     He was restarted on tadalafil in March.  He wanted to resume TRT at that time.  His levels are improved.  He can function but not a full erection.      6/27/2025 H&H were 18.9 and 58.4, up from 15.9 and 48.0 on 4/29/2025.      1. Secondary polycythemia  Assessment & Plan:   Secondary polycythemia.  He works in the hot sun with [employer].  Dehydration can hemoconcentrate it.   He is advised he is at risk of clots or stroke.  He is advised on either therapeutic phlebotomy or blood donation.  He will look into doing that next week.  If he cannot donate then he should have phlebotomy.  Follow up H/H next visit  Orders:  -     Therapeutic phlebotomy (Outside Lab)  -     CBC with Auto Differential; Future  2. Low testosterone in male  Overview:  Variable levels and compliance with a regimen on replacement therapy.  No real significant improvement in sexual funciton.  He was advised on diet, lifestyle and diabetic control.    Assessment & Plan:  Somewhat improved with TRT.  We can continue if we get his H/H under control.  Injection given.  I would hold therapy if his H/H increase from current  3. Other male erectile dysfunction  Overview:  Issues since 2020 with achieving full erection and maintaining

## 2025-07-30 ENCOUNTER — TELEPHONE (OUTPATIENT)
Age: 65
End: 2025-07-30

## 2025-07-30 DIAGNOSIS — D75.1 POLYCYTHEMIA: Primary | ICD-10-CM

## 2025-07-30 DIAGNOSIS — D75.1 SECONDARY POLYCYTHEMIA: ICD-10-CM

## 2025-07-30 DIAGNOSIS — R79.89 LOW TESTOSTERONE IN MALE: Chronic | ICD-10-CM

## 2025-07-30 NOTE — TELEPHONE ENCOUNTER
I filled out forms for donation at [place], which I do not see in the chart.  Patient told Jannet that they would not let him donate there.    Let's try [facility].  I will place referral and we can reschedule him for another 2 weeks out with me.

## 2025-07-30 NOTE — TELEPHONE ENCOUNTER
Patient has apt with me tomorrow (Thursday).  If he had his phlebotomy done, we can see him for injection and repeat CBC.    If he has not been able to do that, we need to push his appointment until after that is done.    Please let him know.

## 2025-08-09 DIAGNOSIS — R79.89 LOW TESTOSTERONE IN MALE: Chronic | ICD-10-CM

## 2025-08-09 DIAGNOSIS — N52.8 OTHER MALE ERECTILE DYSFUNCTION: ICD-10-CM

## 2025-08-11 RX ORDER — TADALAFIL 20 MG/1
TABLET ORAL
Qty: 30 TABLET | Refills: 0 | Status: SHIPPED | OUTPATIENT
Start: 2025-08-11

## (undated) DEVICE — GLOVE SURG SZ 8 L12IN FNGR THK79MIL GRN LTX FREE

## (undated) DEVICE — COVER LT HNDL BLU PLAS

## (undated) DEVICE — TUBING, SUCTION, 1/4" X 12', STRAIGHT: Brand: MEDLINE

## (undated) DEVICE — DRAPE,U/ SHT,SPLIT,PLAS,STERIL: Brand: MEDLINE

## (undated) DEVICE — PREP PAD BNS: Brand: CONVERTORS

## (undated) DEVICE — BASIXCOMPAK INDEFLATOR

## (undated) DEVICE — SOLUTION IRRIG 500ML 0.9% SOD CHL USP POUR PLAS BTL

## (undated) DEVICE — BNDG ELAS HK LOOP 4X5YD NS -- MATRIX

## (undated) DEVICE — BASIC SINGLE BASIN-LF: Brand: MEDLINE INDUSTRIES, INC.

## (undated) DEVICE — SURGICAL PROCEDURE TRAY CRD CATH 3 PRT

## (undated) DEVICE — PRESSURE TUBING: Brand: TRUWAVE

## (undated) DEVICE — SUT MNCRYL PLUS VIO 3-0 27 SH --

## (undated) DEVICE — INTENDED FOR TISSUE SEPARATION, AND OTHER PROCEDURES THAT REQUIRE A SHARP SURGICAL BLADE TO PUNCTURE OR CUT.: Brand: BARD-PARKER SAFETY BLADES SIZE 15, STERILE

## (undated) DEVICE — GUIDE CATHETER: Brand: MACH1™

## (undated) DEVICE — MINOR EXTREMITY PACK: Brand: MEDLINE INDUSTRIES, INC.

## (undated) DEVICE — BOWL MED M 16OZ PLAS CAP GRAD

## (undated) DEVICE — Device: Brand: OMNIWIRE PRESSURE GUIDE WIRE

## (undated) DEVICE — SHOE POSTOP M LG CANVS BGE --

## (undated) DEVICE — PREP SKN CHLRAPRP APL 26ML STR --

## (undated) DEVICE — ANGIO-SEAL VIP VASCULAR CLOSURE DEVICE: Brand: ANGIO-SEAL

## (undated) DEVICE — PINNACLE INTRODUCER SHEATH: Brand: PINNACLE

## (undated) DEVICE — GUIDEWIRE VASC L150CM DIA0.035IN TIP L3MM PTFE J CRV FIX

## (undated) DEVICE — GLOVE ORANGE PI 7 1/2   MSG9075

## (undated) DEVICE — SUTURE PROL SZ 3-0 L36IN NONABSORBABLE BLU L17MM RB-1 1/2 8558H

## (undated) DEVICE — 3M™ TEGADERM™ TRANSPARENT FILM DRESSING FRAME STYLE WITH BORDER, 1616, 4 IN X 4-3/4 IN (10 CM X 12 CM), 50/CT 4CT/CASE: Brand: 3M™ TEGADERM™

## (undated) DEVICE — SOUTHSIDE TURNOVER: Brand: MEDLINE INDUSTRIES, INC.

## (undated) DEVICE — CATHETER ETER VASC OD6FR CARD 145DEG PGTL BRAID JL40 JR40 MP

## (undated) DEVICE — SPONGE GZ W4XL4IN COT 12 PLY TYP VII WVN C FLD DSGN